# Patient Record
Sex: FEMALE | Race: WHITE | NOT HISPANIC OR LATINO | Employment: FULL TIME | URBAN - METROPOLITAN AREA
[De-identification: names, ages, dates, MRNs, and addresses within clinical notes are randomized per-mention and may not be internally consistent; named-entity substitution may affect disease eponyms.]

---

## 2017-01-07 ENCOUNTER — COMMUNICATION - HEALTHEAST (OUTPATIENT)
Dept: OBGYN | Facility: CLINIC | Age: 37
End: 2017-01-07

## 2017-01-12 ENCOUNTER — OFFICE VISIT - HEALTHEAST (OUTPATIENT)
Dept: OBGYN | Facility: CLINIC | Age: 37
End: 2017-01-12

## 2017-01-12 ENCOUNTER — TRANSFERRED RECORDS (OUTPATIENT)
Dept: HEALTH INFORMATION MANAGEMENT | Facility: CLINIC | Age: 37
End: 2017-01-12

## 2017-01-12 DIAGNOSIS — Z15.89 MTHFR MUTATION: ICD-10-CM

## 2017-01-12 DIAGNOSIS — N92.6 IRREGULAR MENSES: ICD-10-CM

## 2017-01-12 ASSESSMENT — MIFFLIN-ST. JEOR: SCORE: 1626.17

## 2017-01-16 ENCOUNTER — COMMUNICATION - HEALTHEAST (OUTPATIENT)
Dept: OBGYN | Facility: CLINIC | Age: 37
End: 2017-01-16

## 2017-01-17 ENCOUNTER — COMMUNICATION - HEALTHEAST (OUTPATIENT)
Dept: MIDWIFE SERVICES | Facility: CLINIC | Age: 37
End: 2017-01-17

## 2017-01-17 ENCOUNTER — AMBULATORY - HEALTHEAST (OUTPATIENT)
Dept: OBGYN | Facility: CLINIC | Age: 37
End: 2017-01-17

## 2017-01-17 DIAGNOSIS — N92.6 IRREGULAR MENSES: ICD-10-CM

## 2017-01-17 LAB
FACTOR V LEIDEN & PGM+MTHFR - HISTORICAL: ABNORMAL
SPECIMEN STATUS: ABNORMAL
THROMBOPHILIA RISK ASSESSMENT-L2M - HISTORICAL: ABNORMAL

## 2017-01-18 ENCOUNTER — AMBULATORY - HEALTHEAST (OUTPATIENT)
Dept: OBGYN | Facility: CLINIC | Age: 37
End: 2017-01-18

## 2017-01-18 DIAGNOSIS — Z15.89 MTHFR MUTATION: ICD-10-CM

## 2017-01-24 ENCOUNTER — AMBULATORY - HEALTHEAST (OUTPATIENT)
Dept: OBGYN | Facility: CLINIC | Age: 37
End: 2017-01-24

## 2017-01-24 ENCOUNTER — MEDICAL CORRESPONDENCE (OUTPATIENT)
Dept: HEALTH INFORMATION MANAGEMENT | Facility: CLINIC | Age: 37
End: 2017-01-24

## 2017-01-24 DIAGNOSIS — D68.52 PROTHROMBIN GENE MUTATION (H): ICD-10-CM

## 2017-01-24 DIAGNOSIS — Z15.89 MTHFR MUTATION: ICD-10-CM

## 2017-01-24 DIAGNOSIS — O26.90 PREGNANCY RELATED CONDITION, UNSPECIFIED TRIMESTER: Primary | ICD-10-CM

## 2017-02-01 ENCOUNTER — PRE VISIT (OUTPATIENT)
Dept: MATERNAL FETAL MEDICINE | Facility: CLINIC | Age: 37
End: 2017-02-01

## 2017-02-02 ENCOUNTER — OFFICE VISIT (OUTPATIENT)
Dept: MATERNAL FETAL MEDICINE | Facility: CLINIC | Age: 37
End: 2017-02-02
Attending: OBSTETRICS & GYNECOLOGY
Payer: COMMERCIAL

## 2017-02-02 DIAGNOSIS — Z31.69 ENCOUNTER FOR PRECONCEPTION CONSULTATION: ICD-10-CM

## 2017-02-02 RX ORDER — CLOMIPHENE CITRATE 50 MG/1
50 TABLET ORAL DAILY
COMMUNITY
End: 2019-02-09

## 2017-02-02 RX ORDER — LEVOTHYROXINE SODIUM 50 UG/1
50 TABLET ORAL
COMMUNITY
Start: 2015-01-22 | End: 2019-02-09

## 2017-02-02 RX ORDER — MAGNESIUM 200 MG
1000 TABLET ORAL
COMMUNITY
End: 2021-08-17

## 2017-02-02 RX ORDER — FERROUS SULFATE 325(65) MG
1 TABLET ORAL
COMMUNITY
End: 2019-02-09

## 2017-02-02 RX ORDER — FLUOXETINE 20 MG/1
1 TABLET, FILM COATED ORAL
COMMUNITY
Start: 2016-05-28 | End: 2021-08-17

## 2017-02-02 RX ORDER — SWAB
SWAB, NON-MEDICATED MISCELLANEOUS
COMMUNITY
End: 2021-08-17

## 2017-02-02 RX ORDER — CHOLECALCIFEROL (VITAMIN D3) 125 MCG
CAPSULE ORAL
COMMUNITY
End: 2021-08-17

## 2017-02-02 NOTE — NURSING NOTE
Leah presents to [facility] for preconceptual consult due to prothrombin mutation.  Leah also has a history of gastric bypass- lap manjeet-en-y 7/2015, PCOS, LEEP 2013.  Leah states she is feeling well, energy level is good.  Leah had 18 month follow up with bariatrics in December 2016. Medications reviewed.  Leah states she did a saliva test through psychiatry to determine what meds may or may not work and the gene mutation was discovered.  Leah has no personal or family history of bleeding or clotting disorders. Dr. Saxena and Dr Amaya met with Leah.    Isabella Reilly RN

## 2017-02-02 NOTE — PROGRESS NOTES
Dear Dr. Bridges,    Thank you for your request for consultation for your patient Ms. Leah Luciano for her history of bariatric surgery, prothrombin gene mutation, MTHFR mutation, and history of LEEP.    HPI: Ms. Luciano is a 36 year old G0 here for preconception counseling. She had a manjeet-en-Y gastric bypass in 7/2015. Her has lost 91 pounds since surgery. She last saw bariatric surgery in 12/2016 and saw nutrition at that time. Bariatric surgery recommended q 3 month nutrition follow up when pregnant, otherwise she sees nutrition and bariatric surgery department every six months with serial micronutrient labs.  Her most recent vitamin levels are noted below. She has been trying to conceive for 7 months. She was recently prescribed clomid by Dr. Bridges and plans to use it with her next cycle. She was diagnosed with MTHFR and prothrombin gene mutations due to a saliva test she took for a psychiatry clinic, which screens for pharmacology metabolism disorders in order to select drug choice. She has never had a DVT, PE or CVA and nobody in her family or extended family has a history of clots.    ObHx:    - Chemical pregnancy last year    GynHx:   - PCOS  - Irregular periods prior to menses  - H/o LEEP 2013, last pap NILM HPV negative  - Denies h/o STI    MedHx:  Past Medical History   Diagnosis Date     PCOS (polycystic ovarian syndrome)      SurgHx:  Past Surgical History   Procedure Laterality Date     Leep tx, cervical  2013     As gastric bypass,obese<100cm manjeet-en-y  7/2015     Laparoscopic bypass gastric       FamHx:   - Denies family history of VTE, CVA, DVT, anesthesia reactions, uterine, breast and ovarian cancer    SocHx:   - Lives with , denies illicit drug use, etoh and tobacco use    Meds:    Current outpatient prescriptions:      B-12, Methylcobalamin, 1000 MCG SUBL, , Disp: , Rfl:      levothyroxine (SYNTHROID/LEVOTHROID) 50 MCG tablet, Take 50 mcg by mouth, Disp: , Rfl:      cholecalciferol  (VITAMIN D3) 5000 UNITS CAPS capsule, Take 1 capsule by mouth, Disp: , Rfl:      co-enzyme Q-10 30 MG CAPS capsule, Take 30 mg by mouth, Disp: , Rfl:      cyanocobalamin 1000 MCG SUBL sublingual tablet, Place 1,000 mcg under the tongue, Disp: , Rfl:      ferrous sulfate (SM IRON) 325 (65 FE) MG tablet, Take 1 tablet by mouth, Disp: , Rfl:      FLUoxetine 20 MG tablet, Take 1 tablet by mouth, Disp: , Rfl:      Prenatal Vit-Fe Fumarate-FA (PRENATAL MULTIVITAMIN  WITH IRON) 28-0.8 MG TABS, , Disp: , Rfl:     Allergies:   Allergies   Allergen Reactions     Sulfa Drugs Unknown     Impression:    At today s visit we discussed with Ms. John the problems the management of pregnancy in the setting of bariatric surgery, obesity, MTHFR mutation and prothrombin gene mutation.  Regarding her history of bariatric surgery and obesity, we discussed that the most common problems are related to nutritional deficiencies, primarily iron, vitamin B12, folate and calcium.  We also discussed her risk of fetal IUGR, pre eclampsia, gestational diabetes,  delivery, and  delivery. We discussed the need to follow fetal growth with growth ultrasounds throughout the pregnancy. We recommend close follow up with bariatric nutritionist every trimester with serial micronutrient levels each trimesters and supplementation if indicated. We also recommend early glucose screening. We discussed that a traditional glucose challenge test can induce symptoms dumping syndrome, so monitoring QID fasting and postprandial blood glucoses for 1-2 weeks in early pregnancy is recommended as an alternative.     We discussed MTHFR mutations and prothrombin gene mutations. We discussed heterozygosity for MTHFR gene is clinically insignificant. Homozygosity is a common cause of hyperhomocysteinemia, which is a very weak risk factor for venous thromboembolism. However, MTHFR mutations by themselves do not appear to convey an increased risks for VTE in  either pregnant or nonpregnant women. Although in the past Lovenox was used in patient with various coagulopathies in an effort to decrease pregnancy loss, it has since been disproven to be of benefit. Regarding her prothrombin gene mutation heterozygote, this is considered a low risk thrombophilia. Prothrombin gene mutation is present in 2-5 percent of the general population and VTE risk per pregnancy in women with no history of VTE is <0.5%. The recommendation for lower-risk thrombophilias without a personal history of VTE is surveillance. Postpartum, anticoagulation may be warranted for individual patients with additional factors that place them at greater risk of thrombosis (prolonged immobility, first degree relative with unprovoked VTE,  delivery or obesity). She is at increased risk for postpartum VTE due to history of obesity. We would recommend surveillance for VTE without anticoagulation therapy during pregnancy, and initiation of prophylactic Lovenox postpartum for a total of six weeks. If exposed to bedrest or prolonged immobilization during pregnancy, initiation of prophylactic Lovenox should be considered. We discussed signs and symptoms of VTE and indications for seeking care.     I have made the following recommendations:  1. Level 2 ultrasound 18-20 weeks, repeat growth ultrasound at 28 and 34 weeks.  2. Follow up with bariatric nutritionist every trimester with serial micronutrient monitoring every trimester and initiation of supplementation if indicated. Obtain CBC, Ferritin, Iron, B12, Thiamine, Folate, Calcium and Vitamin D at first obstetric visit.  3. Early gestational diabetes screening in first trimester, utilize fasting and postprandial blood glucose monitoring  as an alternative to oral glucose challenge test.  4. Aspirin 81 mg daily starting in the first trimester to reduce the risk for preeclampsia/  5. Prophylactic dose LMWH postpartum for six weeks (Lovenox 40 mg sq daily),  earlier if exposed to prolonged immobilization.  6. Close surveillance for signs and symptoms of DVT in pregnancy  7. Genetic counseling in first trimester    A copy of this consultation will be sent to your office. Thank you for the opportunity to participate in the care of this patient.  If you have questions regarding today s evaluation or if we can be of further service, please contact the [facility].     The patient was seen for an outpatient consultation .  The majority of time (>50%) was spent on counseling and coordination of care of this patient and/or family members.  Approximate face-to-face time was 30 minutes.    I served as a scribe for Dr. Amaya for today's visit.    Eliana Saxena MD  OB GYN PGY-2  2/2/2017  5:25 PM    This note, as scribed, accurately reflects the examination, my impressions and plan as discussed with the patient.    Denise Amaya MD  Specialist in Maternal-Fetal Medicine

## 2017-03-27 ENCOUNTER — COMMUNICATION - HEALTHEAST (OUTPATIENT)
Dept: OBGYN | Facility: CLINIC | Age: 37
End: 2017-03-27

## 2017-10-09 ENCOUNTER — COMMUNICATION - HEALTHEAST (OUTPATIENT)
Dept: OBGYN | Facility: CLINIC | Age: 37
End: 2017-10-09

## 2017-11-02 ENCOUNTER — OFFICE VISIT - HEALTHEAST (OUTPATIENT)
Dept: SURGERY | Facility: CLINIC | Age: 37
End: 2017-11-02

## 2017-11-02 DIAGNOSIS — E66.01 OBESITY, MORBID, BMI 40.0-49.9 (H): ICD-10-CM

## 2017-11-02 DIAGNOSIS — Z98.84 BARIATRIC SURGERY STATUS: ICD-10-CM

## 2017-11-02 DIAGNOSIS — Z71.3 NUTRITIONAL COUNSELING: ICD-10-CM

## 2017-11-02 ASSESSMENT — MIFFLIN-ST. JEOR: SCORE: 1800.8

## 2017-11-21 ENCOUNTER — OFFICE VISIT - HEALTHEAST (OUTPATIENT)
Dept: FAMILY MEDICINE | Facility: CLINIC | Age: 37
End: 2017-11-21

## 2017-11-21 DIAGNOSIS — J02.9 SORE THROAT: ICD-10-CM

## 2017-11-21 DIAGNOSIS — J06.9 VIRAL URI: ICD-10-CM

## 2018-01-22 ENCOUNTER — INFUSION - HEALTHEAST (OUTPATIENT)
Dept: INFUSION THERAPY | Facility: CLINIC | Age: 38
End: 2018-01-22

## 2018-01-22 DIAGNOSIS — D50.9 IRON DEFICIENCY ANEMIA: ICD-10-CM

## 2018-01-22 DIAGNOSIS — O99.013 ANEMIA AFFECTING PREGNANCY IN THIRD TRIMESTER: ICD-10-CM

## 2018-01-24 ENCOUNTER — INFUSION - HEALTHEAST (OUTPATIENT)
Dept: INFUSION THERAPY | Facility: CLINIC | Age: 38
End: 2018-01-24

## 2018-01-24 DIAGNOSIS — O99.013 ANEMIA AFFECTING PREGNANCY IN THIRD TRIMESTER: ICD-10-CM

## 2018-01-24 DIAGNOSIS — D50.9 IRON DEFICIENCY ANEMIA: ICD-10-CM

## 2018-01-26 ENCOUNTER — INFUSION - HEALTHEAST (OUTPATIENT)
Dept: INFUSION THERAPY | Facility: CLINIC | Age: 38
End: 2018-01-26

## 2018-01-26 DIAGNOSIS — O99.013 ANEMIA AFFECTING PREGNANCY IN THIRD TRIMESTER: ICD-10-CM

## 2018-01-26 DIAGNOSIS — D50.9 IRON DEFICIENCY ANEMIA: ICD-10-CM

## 2018-03-19 ENCOUNTER — OFFICE VISIT - HEALTHEAST (OUTPATIENT)
Dept: FAMILY MEDICINE | Facility: CLINIC | Age: 38
End: 2018-03-19

## 2018-03-19 DIAGNOSIS — R10.11 RUQ PAIN: ICD-10-CM

## 2018-03-19 DIAGNOSIS — Z00.00 HEALTHCARE MAINTENANCE: ICD-10-CM

## 2018-03-19 ASSESSMENT — MIFFLIN-ST. JEOR: SCORE: 1788.89

## 2018-03-22 ENCOUNTER — HOSPITAL ENCOUNTER (OUTPATIENT)
Dept: ULTRASOUND IMAGING | Facility: CLINIC | Age: 38
Discharge: HOME | End: 2018-03-22
Attending: FAMILY MEDICINE

## 2018-03-22 ENCOUNTER — COMMUNICATION - HEALTHEAST (OUTPATIENT)
Dept: FAMILY MEDICINE | Facility: CLINIC | Age: 38
End: 2018-03-22

## 2018-03-22 DIAGNOSIS — R10.11 RUQ PAIN: ICD-10-CM

## 2018-03-27 ENCOUNTER — COMMUNICATION - HEALTHEAST (OUTPATIENT)
Dept: FAMILY MEDICINE | Facility: CLINIC | Age: 38
End: 2018-03-27

## 2018-03-27 DIAGNOSIS — R11.0 NAUSEA: ICD-10-CM

## 2018-03-27 DIAGNOSIS — R10.13 EPIGASTRIC PAIN: ICD-10-CM

## 2018-03-29 ENCOUNTER — AMBULATORY - HEALTHEAST (OUTPATIENT)
Dept: LAB | Facility: CLINIC | Age: 38
End: 2018-03-29

## 2018-03-29 DIAGNOSIS — R10.13 EPIGASTRIC PAIN: ICD-10-CM

## 2018-03-29 DIAGNOSIS — R11.0 NAUSEA: ICD-10-CM

## 2018-03-29 LAB
ALBUMIN SERPL-MCNC: 2.6 G/DL (ref 3.5–5)
ALP SERPL-CCNC: 125 U/L (ref 45–120)
ALT SERPL W P-5'-P-CCNC: 14 U/L (ref 0–45)
AMYLASE SERPL-CCNC: 38 U/L (ref 5–120)
ANION GAP SERPL CALCULATED.3IONS-SCNC: 9 MMOL/L (ref 5–18)
AST SERPL W P-5'-P-CCNC: 11 U/L (ref 0–40)
BILIRUB SERPL-MCNC: 0.2 MG/DL (ref 0–1)
BUN SERPL-MCNC: 9 MG/DL (ref 8–22)
CALCIUM SERPL-MCNC: 8.8 MG/DL (ref 8.5–10.5)
CHLORIDE BLD-SCNC: 105 MMOL/L (ref 98–107)
CO2 SERPL-SCNC: 23 MMOL/L (ref 22–31)
CREAT SERPL-MCNC: 0.68 MG/DL (ref 0.6–1.1)
GFR SERPL CREATININE-BSD FRML MDRD: >60 ML/MIN/1.73M2
GLUCOSE BLD-MCNC: 76 MG/DL (ref 70–125)
LIPASE SERPL-CCNC: 13 U/L (ref 0–52)
POTASSIUM BLD-SCNC: 4.4 MMOL/L (ref 3.5–5)
PROT SERPL-MCNC: 5.7 G/DL (ref 6–8)
SODIUM SERPL-SCNC: 137 MMOL/L (ref 136–145)

## 2018-05-11 ENCOUNTER — COMMUNICATION - HEALTHEAST (OUTPATIENT)
Dept: FAMILY MEDICINE | Facility: CLINIC | Age: 38
End: 2018-05-11

## 2018-05-30 ENCOUNTER — ANESTHESIA - HEALTHEAST (OUTPATIENT)
Dept: OBGYN | Facility: CLINIC | Age: 38
End: 2018-05-30

## 2018-05-30 ENCOUNTER — SURGERY - HEALTHEAST (OUTPATIENT)
Dept: OBGYN | Facility: CLINIC | Age: 38
End: 2018-05-30

## 2018-05-30 ASSESSMENT — MIFFLIN-ST. JEOR: SCORE: 1828.02

## 2018-06-04 ENCOUNTER — HOME CARE/HOSPICE - HEALTHEAST (OUTPATIENT)
Dept: HOME HEALTH SERVICES | Facility: HOME HEALTH | Age: 38
End: 2018-06-04

## 2018-08-06 ENCOUNTER — COMMUNICATION - HEALTHEAST (OUTPATIENT)
Dept: TELEHEALTH | Facility: CLINIC | Age: 38
End: 2018-08-06

## 2018-08-06 ENCOUNTER — COMMUNICATION - HEALTHEAST (OUTPATIENT)
Dept: HEALTH INFORMATION MANAGEMENT | Facility: CLINIC | Age: 38
End: 2018-08-06

## 2018-10-04 ENCOUNTER — AMBULATORY - HEALTHEAST (OUTPATIENT)
Dept: NURSING | Facility: CLINIC | Age: 38
End: 2018-10-04

## 2018-10-04 DIAGNOSIS — Z00.00 HEALTHCARE MAINTENANCE: ICD-10-CM

## 2018-10-28 ENCOUNTER — OFFICE VISIT - HEALTHEAST (OUTPATIENT)
Dept: FAMILY MEDICINE | Facility: CLINIC | Age: 38
End: 2018-10-28

## 2018-10-28 DIAGNOSIS — M54.2 NECK PAIN ON RIGHT SIDE: ICD-10-CM

## 2018-10-28 LAB
BASOPHILS # BLD AUTO: 0.1 THOU/UL (ref 0–0.2)
BASOPHILS NFR BLD AUTO: 1 % (ref 0–2)
EOSINOPHIL # BLD AUTO: 0.3 THOU/UL (ref 0–0.4)
EOSINOPHIL NFR BLD AUTO: 4 % (ref 0–6)
ERYTHROCYTE [DISTWIDTH] IN BLOOD BY AUTOMATED COUNT: 12.6 % (ref 11–14.5)
HCT VFR BLD AUTO: 42.3 % (ref 35–47)
HGB BLD-MCNC: 13.4 G/DL (ref 12–16)
LYMPHOCYTES # BLD AUTO: 2.8 THOU/UL (ref 0.8–4.4)
LYMPHOCYTES NFR BLD AUTO: 38 % (ref 20–40)
MCH RBC QN AUTO: 28.7 PG (ref 27–34)
MCHC RBC AUTO-ENTMCNC: 31.7 G/DL (ref 32–36)
MCV RBC AUTO: 91 FL (ref 80–100)
MONOCYTES # BLD AUTO: 0.4 THOU/UL (ref 0–0.9)
MONOCYTES NFR BLD AUTO: 6 % (ref 2–10)
NEUTROPHILS # BLD AUTO: 3.7 THOU/UL (ref 2–7.7)
NEUTROPHILS NFR BLD AUTO: 51 % (ref 50–70)
PLATELET # BLD AUTO: 307 THOU/UL (ref 140–440)
PMV BLD AUTO: 10.6 FL (ref 8.5–12.5)
RBC # BLD AUTO: 4.67 MILL/UL (ref 3.8–5.4)
WBC: 7.3 THOU/UL (ref 4–11)

## 2018-12-13 ENCOUNTER — AMBULATORY - HEALTHEAST (OUTPATIENT)
Dept: LAB | Facility: CLINIC | Age: 38
End: 2018-12-13

## 2018-12-13 ENCOUNTER — OFFICE VISIT - HEALTHEAST (OUTPATIENT)
Dept: SURGERY | Facility: CLINIC | Age: 38
End: 2018-12-13

## 2018-12-13 ENCOUNTER — COMMUNICATION - HEALTHEAST (OUTPATIENT)
Dept: SCHEDULING | Facility: CLINIC | Age: 38
End: 2018-12-13

## 2018-12-13 DIAGNOSIS — K91.2 POSTOPERATIVE MALABSORPTION: ICD-10-CM

## 2018-12-13 LAB
ALBUMIN SERPL-MCNC: 3.8 G/DL (ref 3.5–5)
ALP SERPL-CCNC: 135 U/L (ref 45–120)
ALT SERPL W P-5'-P-CCNC: 33 U/L (ref 0–45)
ANION GAP SERPL CALCULATED.3IONS-SCNC: 13 MMOL/L (ref 5–18)
AST SERPL W P-5'-P-CCNC: 29 U/L (ref 0–40)
BILIRUB SERPL-MCNC: 0.3 MG/DL (ref 0–1)
BUN SERPL-MCNC: 15 MG/DL (ref 8–22)
CALCIUM SERPL-MCNC: 9.5 MG/DL (ref 8.5–10.5)
CHLORIDE BLD-SCNC: 106 MMOL/L (ref 98–107)
CO2 SERPL-SCNC: 23 MMOL/L (ref 22–31)
CREAT SERPL-MCNC: 0.78 MG/DL (ref 0.6–1.1)
ERYTHROCYTE [DISTWIDTH] IN BLOOD BY AUTOMATED COUNT: 12.3 % (ref 11–14.5)
FASTING STATUS PATIENT QL REPORTED: NO
FERRITIN SERPL-MCNC: 16 NG/ML (ref 10–130)
FOLATE SERPL-MCNC: 7.6 NG/ML
GFR SERPL CREATININE-BSD FRML MDRD: >60 ML/MIN/1.73M2
GLUCOSE BLD-MCNC: 56 MG/DL (ref 70–125)
HBA1C MFR BLD: 5.8 % (ref 3.5–6)
HCT VFR BLD AUTO: 41.2 % (ref 35–47)
HDLC SERPL-MCNC: 69 MG/DL
HGB BLD-MCNC: 14.1 G/DL (ref 12–16)
LDLC SERPL CALC-MCNC: 139 MG/DL
MCH RBC QN AUTO: 29.3 PG (ref 27–34)
MCHC RBC AUTO-ENTMCNC: 34.2 G/DL (ref 32–36)
MCV RBC AUTO: 86 FL (ref 80–100)
PLATELET # BLD AUTO: 333 THOU/UL (ref 140–440)
PMV BLD AUTO: 8.2 FL (ref 7–10)
POTASSIUM BLD-SCNC: 4.4 MMOL/L (ref 3.5–5)
PROT SERPL-MCNC: 6.9 G/DL (ref 6–8)
PTH-INTACT SERPL-MCNC: 122 PG/ML (ref 10–86)
RBC # BLD AUTO: 4.8 MILL/UL (ref 3.8–5.4)
SODIUM SERPL-SCNC: 142 MMOL/L (ref 136–145)
TSH SERPL DL<=0.005 MIU/L-ACNC: 2.59 UIU/ML (ref 0.3–5)
VIT B12 SERPL-MCNC: 368 PG/ML (ref 213–816)
WBC: 8 THOU/UL (ref 4–11)

## 2018-12-13 ASSESSMENT — MIFFLIN-ST. JEOR: SCORE: 1746.37

## 2018-12-14 LAB — 25(OH)D3 SERPL-MCNC: 18.8 NG/ML (ref 30–80)

## 2018-12-17 LAB
ANNOTATION COMMENT IMP: NORMAL
VIT A SERPL-MCNC: 0.58 MG/L (ref 0.3–1.2)
VITAMIN A (RETINYL PALMITATE): 0.05 MG/L (ref 0–0.1)
ZINC SERPL-MCNC: 54 UG/DL (ref 60–120)

## 2018-12-19 LAB — VIT B1 PYROPHOSHATE BLD-SCNC: 113 NMOL/L (ref 70–180)

## 2019-01-24 ENCOUNTER — AMBULATORY - HEALTHEAST (OUTPATIENT)
Dept: SURGERY | Facility: CLINIC | Age: 39
End: 2019-01-24

## 2019-01-24 ENCOUNTER — COMMUNICATION - HEALTHEAST (OUTPATIENT)
Dept: SURGERY | Facility: CLINIC | Age: 39
End: 2019-01-24

## 2019-01-24 DIAGNOSIS — E21.3 HYPERPARATHYROIDISM (H): ICD-10-CM

## 2019-01-24 DIAGNOSIS — K91.2 POSTOPERATIVE MALABSORPTION: ICD-10-CM

## 2019-01-24 DIAGNOSIS — E55.9 VITAMIN D DEFICIENCY: ICD-10-CM

## 2019-02-09 ENCOUNTER — OFFICE VISIT (OUTPATIENT)
Dept: URGENT CARE | Facility: URGENT CARE | Age: 39
End: 2019-02-09
Payer: COMMERCIAL

## 2019-02-09 ENCOUNTER — ANCILLARY PROCEDURE (OUTPATIENT)
Dept: GENERAL RADIOLOGY | Facility: CLINIC | Age: 39
End: 2019-02-09
Attending: FAMILY MEDICINE
Payer: COMMERCIAL

## 2019-02-09 VITALS
BODY MASS INDEX: 48.52 KG/M2 | HEIGHT: 61 IN | WEIGHT: 257 LBS | SYSTOLIC BLOOD PRESSURE: 118 MMHG | TEMPERATURE: 98.7 F | HEART RATE: 67 BPM | DIASTOLIC BLOOD PRESSURE: 74 MMHG | OXYGEN SATURATION: 97 %

## 2019-02-09 DIAGNOSIS — M79.645 PAIN OF FINGER OF LEFT HAND: Primary | ICD-10-CM

## 2019-02-09 DIAGNOSIS — M79.645 PAIN OF FINGER OF LEFT HAND: ICD-10-CM

## 2019-02-09 PROBLEM — E66.01 MORBID OBESITY (H): Status: ACTIVE | Noted: 2019-02-09

## 2019-02-09 PROCEDURE — 73130 X-RAY EXAM OF HAND: CPT | Mod: RT

## 2019-02-09 PROCEDURE — 99203 OFFICE O/P NEW LOW 30 MIN: CPT | Performed by: FAMILY MEDICINE

## 2019-02-09 ASSESSMENT — MIFFLIN-ST. JEOR: SCORE: 1788.12

## 2019-02-09 NOTE — PATIENT INSTRUCTIONS
1. Ice area twice per day  2. Ibuprofen 400 mg 3 times per day  3. Follow in 1 week if still with pain.

## 2019-02-09 NOTE — PROGRESS NOTES
SUBJECTIVE:  Leah John is a 38 year old female who sustained a right hand injury several days ago. Mechanism of injury: Crush type of injury involving the middle and fourth fingers. Immediate symptoms: immediate swelling. Symptoms have been sudden since that time. Prior history of related problems: no prior problems with this area in the past.  Seems to be getting worse over the last several days hard to bend      Has not used any medication is not used in the immobilization    OBJECTIVE:  Vital signs as noted above.  Appearance: in no apparent distress.  Hand exam: soft tissue tenderness and swelling at the PIP joint of the middle finger extends all the way down to the base of the finger or the MCP.  In addition she has some decreased range of motion  X-ray: no fracture or dislocation noted.    Family history is positive for depression, as well as diabetes    ASSESSMENT:  hand contusion    PLAN:  NSAID, ice suggested  See orders in [facility].  We also placed her in a    Splint.    Review this in 1 week with her primary care if not decrease in pain

## 2020-01-29 ENCOUNTER — RECORDS - HEALTHEAST (OUTPATIENT)
Dept: ADMINISTRATIVE | Facility: OTHER | Age: 40
End: 2020-01-29

## 2020-09-14 ENCOUNTER — COMMUNICATION - HEALTHEAST (OUTPATIENT)
Dept: FAMILY MEDICINE | Facility: CLINIC | Age: 40
End: 2020-09-14

## 2020-09-21 ENCOUNTER — AMBULATORY - HEALTHEAST (OUTPATIENT)
Dept: NURSING | Facility: CLINIC | Age: 40
End: 2020-09-21

## 2020-09-21 DIAGNOSIS — Z23 NEED FOR INFLUENZA VACCINATION: ICD-10-CM

## 2020-09-28 ENCOUNTER — COMMUNICATION - HEALTHEAST (OUTPATIENT)
Dept: FAMILY MEDICINE | Facility: CLINIC | Age: 40
End: 2020-09-28

## 2020-12-13 ENCOUNTER — HEALTH MAINTENANCE LETTER (OUTPATIENT)
Age: 40
End: 2020-12-13

## 2020-12-16 ENCOUNTER — COMMUNICATION - HEALTHEAST (OUTPATIENT)
Dept: FAMILY MEDICINE | Facility: CLINIC | Age: 40
End: 2020-12-16

## 2020-12-17 ENCOUNTER — AMBULATORY - HEALTHEAST (OUTPATIENT)
Dept: FAMILY MEDICINE | Facility: CLINIC | Age: 40
End: 2020-12-17

## 2020-12-17 DIAGNOSIS — M79.673 FOOT ARCH PAIN, UNSPECIFIED LATERALITY: ICD-10-CM

## 2020-12-21 ENCOUNTER — OFFICE VISIT - HEALTHEAST (OUTPATIENT)
Dept: PODIATRY | Facility: CLINIC | Age: 40
End: 2020-12-21

## 2020-12-21 DIAGNOSIS — M21.621 TAILOR'S BUNION OF RIGHT FOOT: ICD-10-CM

## 2020-12-21 DIAGNOSIS — M72.2 PLANTAR FASCIITIS: ICD-10-CM

## 2020-12-22 ENCOUNTER — COMMUNICATION - HEALTHEAST (OUTPATIENT)
Dept: OTHER | Facility: CLINIC | Age: 40
End: 2020-12-22

## 2021-01-15 ENCOUNTER — RECORDS - HEALTHEAST (OUTPATIENT)
Dept: ADMINISTRATIVE | Facility: OTHER | Age: 41
End: 2021-01-15

## 2021-01-22 ENCOUNTER — AMBULATORY - HEALTHEAST (OUTPATIENT)
Dept: OTHER | Facility: CLINIC | Age: 41
End: 2021-01-22

## 2021-01-22 DIAGNOSIS — M72.2 PLANTAR FASCIAL FIBROMATOSIS: ICD-10-CM

## 2021-01-22 DIAGNOSIS — M21.621 TAILOR'S BUNION OF RIGHT FOOT: ICD-10-CM

## 2021-03-31 ENCOUNTER — OFFICE VISIT - HEALTHEAST (OUTPATIENT)
Dept: FAMILY MEDICINE | Facility: CLINIC | Age: 41
End: 2021-03-31

## 2021-03-31 DIAGNOSIS — N64.4 BREAST PAIN: ICD-10-CM

## 2021-03-31 ASSESSMENT — MIFFLIN-ST. JEOR: SCORE: 1866.12

## 2021-05-26 VITALS
SYSTOLIC BLOOD PRESSURE: 116 MMHG | RESPIRATION RATE: 16 BRPM | HEART RATE: 80 BPM | DIASTOLIC BLOOD PRESSURE: 74 MMHG | TEMPERATURE: 98.2 F

## 2021-05-29 ENCOUNTER — RECORDS - HEALTHEAST (OUTPATIENT)
Dept: ADMINISTRATIVE | Facility: CLINIC | Age: 41
End: 2021-05-29

## 2021-05-30 VITALS — WEIGHT: 227 LBS | BODY MASS INDEX: 44.57 KG/M2 | HEIGHT: 60 IN

## 2021-05-31 VITALS — BODY MASS INDEX: 49.77 KG/M2 | WEIGHT: 263.4 LBS

## 2021-05-31 VITALS — BODY MASS INDEX: 49.47 KG/M2 | WEIGHT: 262 LBS | HEIGHT: 61 IN

## 2021-06-01 VITALS — BODY MASS INDEX: 50.6 KG/M2 | WEIGHT: 268 LBS | HEIGHT: 61 IN

## 2021-06-01 VITALS — HEIGHT: 60 IN | WEIGHT: 262 LBS | BODY MASS INDEX: 51.44 KG/M2

## 2021-06-02 VITALS — WEIGHT: 250 LBS | BODY MASS INDEX: 47.24 KG/M2

## 2021-06-02 VITALS — BODY MASS INDEX: 47.2 KG/M2 | HEIGHT: 61 IN | WEIGHT: 250 LBS

## 2021-06-05 VITALS
HEART RATE: 66 BPM | BODY MASS INDEX: 54.95 KG/M2 | DIASTOLIC BLOOD PRESSURE: 68 MMHG | SYSTOLIC BLOOD PRESSURE: 102 MMHG | WEIGHT: 279.9 LBS | OXYGEN SATURATION: 95 % | HEIGHT: 60 IN

## 2021-06-08 NOTE — PROGRESS NOTES
CC: The patient is being seen as a consult from Mary Gr CNM, secondary to a desire for pregnancy.    HPI: The pt is a 36 y.o. MWF P0 who presents with a desire to get pregnant and irregular cycles.  Over the last year her cycles have ranged from 28 to 58 days, with the interval lengthening as the year has progressed.  She only bleeds for 2-3 days, and it is light.  Her periods have been irregular since she was a teenager.  She has been on OCPs/NuvaRing in the past.  She had a gastric bypass done in July of 2015 and has lost 95 pounds.  Her weight has been stable for the last 6 months or so.  She and her  have been attempting pregnancy since June.  She has done ovulation testing with both BBTs and urine predictor kits.  Her ovulation happens about 15 days before her menses starts.  She was diagnosed with PCOS in the past because of her clinical picture, but she's not had labs drawn or ultrasounds done.  She has been on metformin in the past that was no help as far as weight loss was concerned.  She is on phentermine now.  She recently started trazodone to help with sleep.  She is taking a prenatal vitamin along with her other supplements.  She did have a saliva test that was positive for a MTHFR mutation but it doesn't say if it is hetero- or homozygous.    Past Medical History   Diagnosis Date     Anisometropia      Anxiety      Cervical intraepithelial neoplasia III 2013     Depression      20s; on medications zoloft; prozac x 1 year     Dyslipidemia 2014     Hypothyroidism      Metabolic syndrome      Morbid obesity      MTHFR mutation      Polycystic ovary syndrome      Pre-diabetes      Strabismus        Past Surgical History   Procedure Laterality Date     Ganglion cyst excision Right      Eye surgery       x3; as a child     Pr lap gastric bypass/manjeet-en-y N/A 7/15/2015     Mark Olivier MD; [facility]     Cervical biopsy  w/ loop electrode excision  2013     ALFRED III       Patient's   Family  History   Problem Relation Age of Onset     Obesity Mother      Depression Father      Depression Sister      Depression Brother      Diabetes Brother      insulin     Depression Maternal Grandmother      Depression Maternal Grandfather      Depression Paternal Grandmother      Depression Paternal Grandfather      Cancer Paternal Grandfather      found late, mets, possible lung     Anesthesia problems Neg Hx        Patient   Social History     Social History     Marital status:      Spouse name: N/A     Number of children: N/A     Years of education: N/A     Social History Main Topics     Smoking status: Never Smoker     Smokeless tobacco: Never Used     Alcohol use 1.5 oz/week     3 Standard drinks or equivalent per week     Drug use: No     Sexual activity: Yes     Partners: Male     Birth control/ protection: None     Other Topics Concern     None     Social History Narrative       Current Outpatient Prescriptions   Medication Sig Dispense Refill     PNV95/FERROUS FUMARATE/FA (PRENATAL ORAL) Take by mouth.       ALPRAZolam (XANAX) 0.25 MG tablet Take 1 tablet by mouth as needed.  2     cholecalciferol, vitamin D3, 5,000 unit capsule Take 1 capsule by mouth daily.       CHOLINE BITARTRATE ORAL Take 1 tablet by mouth daily.       clomiPHENE (CLOMID) 50 mg tablet Take 1 tablet (50 mg total) by mouth daily for 5 days. Take days 3-7 of cycle 5 tablet 3     co-enzyme Q-10 30 mg capsule Take 30 mg by mouth daily.       cyanocobalamin, vitamin B-12, 1,000 mcg Subl Place 1,000 mcg under the tongue daily.       ferrous sulfate (IRON) 325 (65 FE) MG tablet Take 1 tablet by mouth daily with breakfast.       FLUoxetine (PROZAC) 20 MG tablet Take 1 tablet by mouth daily.  11     INOSITOL ORAL Take 4,000 mg by mouth daily.       MULTIVIT WITH IRON,HEMATINIC (SUPER B-COMPLEX ORAL) Take 1 capsule by mouth daily.       phentermine (ADIPEX-P) 37.5 mg tablet Take 1/2 to 1 tablet in the morning. 90 tablet 1     phentermine  (ADIPEX-P) 37.5 mg tablet Take 1/2 to 1 tablet in the morning. 90 tablet 1     traZODone (DESYREL) 50 MG tablet Take 1 tablet by mouth bedtime as needed.  11     vitamin E 400 unit capsule Take 1 capsule by mouth daily.       No current facility-administered medications for this visit.        Patient is allergic to sulfa (sulfonamide antibiotics).    ROS:  12 part ROS is negative aside from those symptoms in the HPI    PE:    Visit Vitals     /62 (Patient Site: Left Arm, Patient Position: Sitting, Cuff Size: Infant)     Pulse 78     Resp 20     Ht 5' (1.524 m)     Wt (!) 227 lb (103 kg)     LMP 01/05/2017             Body mass index is 44.33 kg/(m^2).    General: obese WF, NAD  Psych: normal mood  Neuro: CN I-XII grossly intact  MS: normal gait    Assessment: 36 y.o. MWF P0 with likely polycystic ovarian syndrome causing irregular menses.    Plan: Natural history of PCOS and fertility discussed with the patient.  We also discussed PCOS in general with it's increased risks for endometrial cancer and diabetes over time.  We discussed her medications and that she should discontinue the phentermine (category X) now and the trazodone (category C) with conception.  We discussed Clomid, and she would like to go ahead and try it.  We discussed the increased risk of multiple gestation of 10-12% with Clomid use.  We discussed timing and dosing.  Prescription was sent in for her.  We discussed that 85% of women who will get pregnant from Clomid will do so in the first 3-4 months of use at the correct dose.  If she doesn't get a period by day 35 after using it and she isn't pregnant, she will let me know so we can increase the dose.  MTHFR testing was ordered today to determine what her actual status is so if she needs further intervention with pregnancy, that can occur.  We discussed PNV, vitamin D and fish oil specific to pregnancy.  We also discussed exercise and pregnancy.    Approximately 40 minutes were spent with  the patient with the majority in counseling.

## 2021-06-11 NOTE — TELEPHONE ENCOUNTER
Upcoming Appointment Question  When is the appointment: 09/17/20  What is your appointment for?: Flu shot - family of 2   Who is your appointment scheduled with?: [facility] Nurse/clinical schedule  What is your question/concern?: patient was calling to reschedule her and her daughter's flu shot appointments.    Patient states she is looking for late in the date as possible on a Monday, Wednesday, or a Friday.   CS is currently only scheduling flu shots for cgr/rs/stw; patient states these location would not work and was hoping clinic would be able to reschedule them at [facility]  Okay to leave a detailed message?: Yes

## 2021-06-13 NOTE — PATIENT INSTRUCTIONS - HE
What are Prescription Custom Orthotics?  Custom orthotics are specially-made devices designed to support and comfort your feet. Prescription orthotics are crafted for you and no one else. They match the contours of your feet precisely and are designed for the way you move. Orthotics are only manufactured after a podiatrist has conducted a complete evaluation of your feet, ankles, and legs, so the orthotic can accommodate your unique foot structure and pathology.  Prescription orthotics are divided into two categories:    Functional orthotics are designed to control abnormal motion. They may be used to treat foot pain caused by abnormal motion; they can also be used to treat injuries such as shin splints or tendinitis. Functional orthotics are usually crafted of a semi-rigid material such as plastic or graphite.    Accommodative orthotics are softer and meant to provide additional cushioning and support. They can be used to treat diabetic foot ulcers, painful calluses on the bottom of the foot, and other uncomfortable conditions.  Podiatrists use orthotics to treat foot problems such as plantar fasciitis, bursitis, tendinitis, diabetic foot ulcers, and foot, ankle, and heel pain. Clinical research studies have shown that podiatrist-prescribed foot orthotics decrease foot pain and improve function.  Orthotics typically cost more than shoe inserts purchased in a retail store, but the additional cost is usually well worth it. Unlike shoe inserts, orthotics are molded to fit each individual foot, so you can be sure that your orthotics fit and do what they're supposed to do. Prescription orthotics are also made of top-notch materials and last many years when cared for properly. Insurance often helps pay for prescription orthotics.  What are Shoe Inserts?   You've seen them at the grocery store and at the mall. You've probably even seen them on TV and online. Shoe inserts are any kind of non-prescription foot support  designed to be worn inside a shoe. Pre-packaged, mass produced, arch supports are shoe inserts. So are the  custom-made  insoles and foot supports that you can order online or at retail stores. Unless the device has been prescribed by a doctor and crafted for your specific foot, it's a shoe insert, not a custom orthotic device--despite what the ads might say.  Shoe inserts can be very helpful for a variety of foot ailments, including flat arches and foot and leg pain. They can cushion your feet, provide comfort, and support your arches, but they can't correct biomechanical foot problems or cure long-standing foot issues.  The most common types of shoe inserts are:    Arch supports: Some people have high arches. Others have low arches or flat feet. Arch supports generally have a  bumped-up  appearance and are designed to support the foot's natural arch.     Insoles: Insoles slip into your shoe to provide extra cushioning and support. Insoles are often made of gel, foam, or plastic.     Heel liners: Heel liners, sometimes called heel pads or heel cups, provide extra cushioning in the heel region. They may be especially useful for patients who have foot pain caused by age-related thinning of the heels' natural fat pads.     Foot cushions: Do your shoes rub against your heel or your toes? Foot cushions come in many different shapes and sizes and can be used as a barrier between you and your shoe.  Choosing an Over-the-Counter Shoe Insert  Selecting a shoe insert from the wide variety of devices on the market can be overwhelming. Here are some podiatrist-tested tips to help you find the insert that best meets your needs:    Consider your health. Do you have diabetes? Problems with circulation? An over-the-counter insert may not be your best bet. Diabetes and poor circulation increase your risk of foot ulcers and infections, so schedule an appointment with a podiatrist. He or she can help you select a solution that won't  cause additional health problems.     Think about the purpose. Are you planning to run a marathon, or do you just need a little arch support in your work shoes? Look for a product that fits your planned level of activity.     Bring your shoes. For the insert to be effective, it has to fit into your shoes. So bring your sneakers, dress shoes, or work boots--whatever you plan to wear with your insert. Look for an insert that will fit the contours of your shoe.     Try them on. If all possible, slip the insert into your shoe and try it out. Walk around a little. How does it feel? Don't assume that feelings of pressure will go away with continued wear. (If you can't try the inserts at the store, ask about the store's return policy and hold on to your receipt.)    Please call one of the [place] locations below to schedule an appointment. If you received a prescription please bring it with you to your appointment. Some locations are limited to what they carry.    Office Locations    [facility]  [address] [place] [address]  Home Medical Equipment, [address]   Phone: [phone]   Orthotics and Prosthetics, [address]   Phone: [phone]    [facility]  Home Medical Equipment  [address], [address], [place] [address]   Phone: [phone]    Orthotics and Prosthetics ([facility])    [address], [address], [place] [address]  Phone: [phone]    [facility]  [address]   [place] [address]   Phone: [phone]    [facility]  [address]  [place] [address]  Phone: [phone]    [facility] Bld[facility]   [address]  [place] [address]  Phone: [phone]    [facility]  [address]  [place] [address]  Phone: [phone]    [facility]  [address]  [place] [address]  Phone: [phone]    [address] [place] [address]   Phone: [phone]

## 2021-06-13 NOTE — PROGRESS NOTES
Non-surgical Weight Loss Follow Up Diet Evaluation    Assessment:  This patient is a 37 y.o. female is being seen today for follow-up non-surgical nutritional evaluation. Today we reviewed the patients current eating habits and level of physical activity, and instructed on the changes that are required for successful weight loss outcomes.    Pt is 9 weeks pregnant and is 2 years s/p RNY.     Phentermine: none     Pt's Initial Weight: 311 lbs  Weight: 262 lb (118.8 kg)  Weight loss from initial: 49  % Weight loss: 15.76 %  BMI: Body mass index is 49.5 kg/(m^2).  IBW: 105 lbs    Personal goal weight: none at this time     Estimated RMR ([place]-St Jeor equation):  1818 calories  Protein requirements (.5grams to .9grams per pound IBW, 20-30% of calories, minimum of 60-80gm per day):  55-95 grams    Progress made since last visit: Pt is 9 weeks pregnant and experiencing nausea, thus little food intake has been consumed. Pt reports trying to drink more water and watch portion sizes.   Concerns: low protein intake.     Diet Recall/Time:   Breakfast: english muffin w/ butter and string cheese and milk (15g)  Am Snack: none or handful of cheerios  Lunch: chicken wraps or shamir noodles (0-15g)   Pm snack: cheese stick (8g)  Dinner: chicken wrap with lettuce, cheese, ranch (15g)   HS Snack: none or no-sugar fruit popsicle     Per Diet recall estimated protein: 30-50 grams    Meals per week away from home: 1x/week     Recommended limiting eating out to no more than 2x/week.  Patient and I reviewed the importance of eating three consistent meals per day; as well as meal timing to be spaced 4-5 hours apart.  Snack choices: 100-150 calories (1-2x/day if physically hungry), incorporating a fruit/vegetable w/ protein source.    Meal Duration: 15 minutes    Portion Sizes problematic? YES per patient/diet recall  Encouraged slowing meal times down, 20-30 minutes, chewing to applesauce consistency.   To aid in proper portion control  and slow meal time down discussed consuming meals off smaller plates, use toddler/children utensils and set utensils down after each bite.    Protein, vegetables/fruits, carbohydrates:   The patient and I discussed the importance of including lean/low fat protein at each meal and limiting carbohydrate intake to less than 25% of plate volume.       Vitamins/Mineral Supplementation: prenatal, vitamin C, all bariatric labs WNL     Beverages (Type/Oz. per day)  Water: 64 oz or Gatorade light   Coffee: none  Tea: pritesh tea in the morning   Milk: 16 oz 1%   Regular soda: none   Diet soda: none   Juice: none   Rinku-Aid/lemonade/etc: none   Alcohol: none     Discussed the importance of adequate hydration and the goal of 64+ oz of fluid daily.   The patient understands the importance of  avoiding all sweetened and alcoholic drinks, and instead choosing 64 oz plain water.    Exercise  Nothing routine established.     Pt's understands that 45-60 minutes of daily activity is an important part of weight loss success.   Encouraged pt to incorporate  strength training exercise in addition to cardiovascular exercise most days of the week.    PES statement:     1. (NC-3.3.5) Obese, class III, BMI ?40 related to physical inactivity as evidenced by Infrequent, low-duration and or low intensity physical activity; and Large amounts of sedentary activities; no structured physical activity regimen.    Intervention:  Discussion:  1. Educated pt on Eat Better, Move More, Live Well: Non-surgical Weight Loss Handout  2. Reviewed lean protein sources.  20-30 gm protein at 3 meals daily. 60-80 grams daily total.  3. Educated pt on pregnancy nutrition therapy after bariatric surgery   4. Discussed the benefit of staying hydrated and increasing protein  5. Discussed weight gain expected during pregnancy   6. Plate Method: The patient and I discussed the importance of including lean/low  fat protein at each meal and limiting carbohydrate intake to  less  than 25% of plate volume.    Instructions/Goals:   1. Include 20-30gm protein at each meal.  2. Increase vegetable/fruit intake, by having a vegetable or fruit with each meal daily. Recommended pt to increase vegetable/fruit intake to 4-5 servings daily.  3. Increase fluid intake to 64oz daily: choose plain or calorie/alcohol-free beverages.  4. Incorporate daily structured activity, 45-60 minutes most days of the week         Practice plate method: 1/2 plate lean/low fat protein source, vegetable/fruit, <25% of plate complex carbohydrates.  5. Practice eating off of smaller plates/bowls, chewing to applesauce consistency, taking 20-30 minutes to eat in a calm/relaxed environment without distractions of tv/email/cell phone.    Handouts Provided:  Pregnancy Nutrition after Bariatric Surgery  List of protein sources    Monitor/Evaluation:    Pt will f/u in one month with bariatrician and RD.     Plan for next visit with RD:    Review protein intake   Educated pt on food labels  Review carbohydrates/fiber  Exercise      Time In: 11:00am  Time Out: 11:30am      ABN signed: Yes

## 2021-06-13 NOTE — PROGRESS NOTES
FOOT AND ANKLE SURGERY/PODIATRY Progress Note        ASSESSMENT:   Plantar Fasciitis right   Gastrosoleus Equinus   Tailor's bunion right       TREATMENT:  -Patient has pain along the plantar heel at insertion of plantar fascia consistent with plantar fasciitis. We discussed treatment options to include stretching exercises, anti-inflammatory medication, orthotics, steroid injections, physical therapy and a night splint.     -All questions invited and answered. I will start her on Naproxen and have referred her to [facility] for custom orthotics.     -I have referred her for right foot x-rays to evaluate for bony pathology. I will contact her with any abnormal results.     -I have asked her to follow-up after using the orthotics x3-4 weeks if symptoms continue.     Theo Raines DPM  [facility] Podiatry/Foot & Ankle Surgery      HPI: I was asked to see Leah John today for right heel pain and lateral foot pain. The patient denies trauma and states she has had pain for several months. She describes plantar right arch pain which she has used accommodative inserts with some relief, obtained in . She also reports having pain along the lateral right foot along the 5th metatarsal. Works on her feet.     Past Medical History:   Diagnosis Date     Anisometropia      Anxiety      Cervical intraepithelial neoplasia III      Depression     20s; on medications zoloft; prozac x 1 year     Dyslipidemia      Hypothyroidism      Metabolic syndrome      Morbid obesity (H)      MTHFR mutation (H)     heterozygous     Polycystic ovary syndrome      Pre-diabetes      Prothrombin gene mutation (H) 2017    heterozygous     Strabismus        Past Surgical History:   Procedure Laterality Date     CERVICAL BIOPSY  W/ LOOP ELECTRODE EXCISION      ALFRED III      SECTION N/A 2018    Procedure: PRIMARY  SECTION;  Surgeon: Becky Rg MD;  Location: [facility] OR;  Service:       EYE SURGERY      x3; as a child     GANGLION CYST EXCISION Right      GASTRIC BYPASS       MD LAP GASTRIC BYPASS/CRISTIANE-EN-Y N/A 7/15/2015    Mrak Olivier MD; [facility]       Allergies   Allergen Reactions     Sulfa (Sulfonamide Antibiotics) Other (See Comments)     Pain         Current Outpatient Medications:      FLUoxetine (PROZAC) 20 MG tablet, Take 1 tablet by mouth daily., Disp: , Rfl: 11     PNV95/FERROUS FUMARATE/FA (PRENATAL ORAL), Take by mouth., Disp: , Rfl:      naproxen (NAPROSYN) 500 MG tablet, Take 1 tablet (500 mg total) by mouth 2 (two) times a day with meals for 14 days., Disp: 28 tablet, Rfl: 0    Family History   Problem Relation Age of Onset     Obesity Mother      Depression Father      Depression Sister      Depression Brother      Diabetes Brother         insulin     Depression Maternal Grandmother      Depression Maternal Grandfather      Depression Paternal Grandmother      Depression Paternal Grandfather      Cancer Paternal Grandfather         found late, mets, possible lung     Anesthesia problems Neg Hx        Social History     Socioeconomic History     Marital status:      Spouse name: Not on file     Number of children: Not on file     Years of education: Not on file     Highest education level: Not on file   Occupational History     Not on file   Social Needs     Financial resource strain: Not on file     Food insecurity     Worry: Not on file     Inability: Not on file     Transportation needs     Medical: Not on file     Non-medical: Not on file   Tobacco Use     Smoking status: Never Smoker     Smokeless tobacco: Never Used   Substance and Sexual Activity     Alcohol use: No     Alcohol/week: 2.5 standard drinks     Types: 3 Standard drinks or equivalent per week     Drug use: No     Sexual activity: Yes     Partners: Male     Birth control/protection: None   Lifestyle     Physical activity     Days per week: Not on file     Minutes per session: Not on file     Stress:  Not on file   Relationships     Social connections     Talks on phone: Not on file     Gets together: Not on file     Attends [employer] service: Not on file     Active member of club or organization: Not on file     Attends meetings of clubs or organizations: Not on file     Relationship status: Not on file     Intimate partner violence     Fear of current or ex partner: Not on file     Emotionally abused: Not on file     Physically abused: Not on file     Forced sexual activity: Not on file   Other Topics Concern     Not on file   Social History Narrative     Not on file       Review of Systems - 10 point Review of Systems is negative except for foot pain which is noted in HPI.    OBJECTIVE:  Appearance: alert, well appearing, and in no distress.    General appearance: Patient is alert and fully cooperative with history & exam.  No sign of distress is noted during the visit.     Psychiatric: Affect is pleasant & appropriate.  Patient appears motivated to improve health.     Respiratory: Breathing is regular & unlabored while sitting.     HEENT: Hearing is intact to spoken word.  Speech is clear.  No gross evidence of visual impairment that would impact ambulation.    Vascular: Dorsalis pedis and posterior tibial pulses are palpable. There is pedal hair growth bilateral.  CFT < 3 sec from anterior tibial surface to distal digits bilateral. There is no appreciable edema noted.  Dermatologic: Turgor and texture are within normal limits. No coloration or temperature changes. No primary or secondary lesions noted.  Neurologic: All epicritic and proprioceptive sensations are grossly intact bilateral.  Musculoskeletal: Mild pain along the plantar right arch along the course of the plantar fascia. Limited ankle dorsiflexion with knee extended and flexed. Mild pain along 5th metatarsal shaft right. Mild prominence of the 5th metatarsal head right.     Imaging:     No results found.

## 2021-06-14 NOTE — PROGRESS NOTES
Assessment:     1. Viral URI     2. Sore throat  Rapid Strep A Screen-Throat    Group A Strep, RNA Direct Detection, Throat          Plan:     Symptoms consistent with a viral upper respiratory infection.  Recommend symptomatic care.  No antibiotics indicated at this time.  Rapid strep screen is negative.  Recommend following up if symptoms are getting worse or not continuing to improve.    Subjective:       37 y.o. female presents for evaluation of a 1 day history of sore throat, headache, nasal congestion, and feeling like her lymph nodes are swollen in her neck, particularly underneath her chin on the right side.  She is 12 weeks gestation.  She denies any fever, cough, sore throat, ear pain, or facial pain.  She has not tried anything for her symptoms.    The following portions of the patient's history were reviewed and updated as appropriate: allergies, current medications, past family history, past medical history, past social history, past surgical history and problem list.    Review of Systems  A 12 point comprehensive review of systems was negative except as noted.     Objective:        Vitals:    11/21/17 1753   BP: 100/62   Pulse: 62   Temp: 98.3  F (36.8  C)   SpO2: 100%     General Appearance:    Alert, pleasant, cooperative, no distress, appears stated age   Head:    Normocephalic, without obvious abnormality, atraumatic   Eyes:    Conjunctiva/corneas clear   Ears:    Normal TM's without erythema or bulging. Ninfa external ear canals, both ears   Nose:   Nares normal, septum midline, mucosa normal, no drainage    or sinus tenderness   Throat:  Mild erythema noted in the posterior oropharynx without significant tonsillar hypertrophy or exudate seen.   Neck:      Cardiovascular:  Neck is supple with some mildly tender submandibular lymphadenopathy on the right side.  No other significant adenopathy noted.  Regular rate and rhythm, no murmurs, rubs, or gallops.   Lungs:     Clear to auscultation  bilaterally without wheezes, rales, or rhonchi, respirations unlabored    Recent Results (from the past 24 hour(s))   Rapid Strep A Screen-Throat   Result Value Ref Range    Rapid Strep A Antigen No Group A Strep detected, presumptive negative No Group A Strep detected, presumptive negative                               This note has been dictated using voice recognition software. Any grammatical or context distortions are unintentional and inherent to the software

## 2021-06-16 NOTE — PROGRESS NOTES
Assessment/Plan:     Patient presents to clinic with symptoms consistent with gallstones, although I do not suspect acute cholecystitis.  Will obtain a right upper quadrant ultrasound and a referral to general surgery for management options during pregnancy.  Considered obtaining lipid and glucose monitoring, but patient is pregnant, cannot take a statin at this time anyway, and is being managed by other specialties.  Will consider lab work when patient is delivered.  Discussed continued abstinence from benzodiazepines, patient is amenable.      USPSTF Recommendations for age 37:  - patient has been screened for cervical cancer per protocol in 2016, results were negative, next screening due in 2021  - discussed intimate partner violence and there are no concerns at this time  - of the recommended screening for chlamydia, HIV, syphilis, gonorrhea, and hepatitis, patient chose no screening  - discussed healthful diet and physical activity for CVD disease prevention; lipid and DM II screening was not performed as patient is being managed by other specialties, has recently had lab work, and is currently pregnant  - immunizations are up to date        Discontinued Medications:  Medications Discontinued During This Encounter   Medication Reason     ALPRAZolam (XANAX) 0.25 MG tablet      cholecalciferol, vitamin D3, 5,000 unit capsule      ergocalciferol (VITAMIN D2) 50,000 unit capsule      CHOLINE BITARTRATE ORAL      co-enzyme Q-10 30 mg capsule      cyanocobalamin, vitamin B-12, 1,000 mcg Subl      INOSITOL ORAL      phentermine (ADIPEX-P) 37.5 mg tablet Therapy completed     phentermine (ADIPEX-P) 37.5 mg tablet Therapy completed     MULTIVIT WITH IRON,HEMATINIC (SUPER B-COMPLEX ORAL)      traZODone (DESYREL) 50 MG tablet      vitamin E 400 unit capsule        AVS printed and given to patient.  Return to clinic in 14 weeks.    I have had an Advance Directives discussion with the patient.    Total time spent with  "patient was 40 minutes with greater than 50% spent in face-to-face counseling regarding the above plan.    This note has been dictated using voice recognition software. Any grammatical or context distortions are unintentional and inherent to the the software.     Andra Olsen MD  Family Medicine [facility]      Subjective:      Leah John is a 37 y.o. female who presents to clinic for establishment of care.    Patient suspects that she has gallstones.  She has never had symptoms like this before.  However, last week, on Tuesday she ate dinner and then immediately noticed some diarrhea and hyperactivity of the gastrointestinal tract with cramping.  The next day she expands nausea, pain, and vomiting.  The pain is located in the right upper quadrant.  She is trying to eat less and decrease her fatty food intake but even just thought of food makes her nauseated.    Patient also history of depression.  She is currently on fluoxetine which is managed by psychiatry.  She previously took Xanax to help with insomnia but is not taking that during this pregnancy.    She has a history of gastric bypass and is managed by gastroenterology for all electrolyte and vitamin deficiencies.      Old records reviewed:   Progress notes from November 21, 2017, November 2, 2017, and January 12, 2017    Past Medical History, Family History, and Social History reviewed.   History   Smoking Status     Never Smoker   Smokeless Tobacco     Never Used       Review of systems is as stated in HPI.  Patient endorses: fatigue, spots in vision (occured after vomiting exclusively), dizziness (orthostatic), SOB (related to pregnancy), swelling in legs, nausea/vomiting/diarrhea/constipation, back pain, incontinence, increased urination   The remainder of the 10 system review is otherwise negative.    Objective:     BP 98/60  Pulse 96  Ht 5' 0.25\" (1.53 m)  Wt (!) 262 lb (118.8 kg)  LMP 08/15/2017 (Approximate)  SpO2 98%  BMI 50.74 " kg/m2 Body mass index is 50.74 kg/(m^2).    Gen: Alert, NAD, appears stated age, normal hygiene   Eyes: conjunctivae without injection, sclera clear, EOMI  ENT/mouth: nares clear, septum midline, absent rhinorrhea, pharyngeal injection absent, neck is supple, no thyroid enlargement, small uvula  CV: RRR, no murmur appreciated, pedal edema absent bilaterally  Resp: CTAB, no wheezes, rales or ronchi  ABD: normoactive, non-tender to palpation, negative gan's sign, gravid  MSK: grossly full range of motion in all joints, no obvious deformity  Neuro: CN II-XII grossly intact, no deficits in coordination  Psych: no apparent hallucinations or delusions, no pressured speech; alert, oriented x3  SKIN: dry and without lesions  Heme/lymph: no pallor, no active bleeding/bruising, no adenopathy appreciated      Current Outpatient Prescriptions on File Prior to Visit   Medication Sig Dispense Refill     aspirin 81 mg chewable tablet Chew 81 mg daily.       ferrous sulfate (IRON) 325 (65 FE) MG tablet Take 1 tablet by mouth daily with breakfast.       FLUoxetine (PROZAC) 20 MG tablet Take 1 tablet by mouth daily.  11     levothyroxine (SYNTHROID, LEVOTHROID) 50 MCG tablet Take 50 mcg by mouth.       PNV95/FERROUS FUMARATE/FA (PRENATAL ORAL) Take by mouth.       [DISCONTINUED] ALPRAZolam (XANAX) 0.25 MG tablet Take 1 tablet by mouth as needed.  2     [DISCONTINUED] cholecalciferol, vitamin D3, 5,000 unit capsule Take 1 capsule by mouth daily.       [DISCONTINUED] CHOLINE BITARTRATE ORAL Take 1 tablet by mouth daily.       [DISCONTINUED] co-enzyme Q-10 30 mg capsule Take 30 mg by mouth daily.       [DISCONTINUED] cyanocobalamin, vitamin B-12, 1,000 mcg Subl Place 1,000 mcg under the tongue daily.       [DISCONTINUED] ergocalciferol (VITAMIN D2) 50,000 unit capsule Take 50,000 Units by mouth every 7 days.       [DISCONTINUED] INOSITOL ORAL Take 4,000 mg by mouth daily.       [DISCONTINUED] MULTIVIT WITH IRON,HEMATINIC (SUPER  B-COMPLEX ORAL) Take 1 capsule by mouth daily.       [DISCONTINUED] phentermine (ADIPEX-P) 37.5 mg tablet Take 1/2 to 1 tablet in the morning. 90 tablet 1     [DISCONTINUED] phentermine (ADIPEX-P) 37.5 mg tablet Take 1/2 to 1 tablet in the morning. 90 tablet 1     [DISCONTINUED] traZODone (DESYREL) 50 MG tablet Take 1 tablet by mouth bedtime as needed.  11     [DISCONTINUED] vitamin E 400 unit capsule Take 1 capsule by mouth daily.       No current facility-administered medications on file prior to visit.

## 2021-06-16 NOTE — PROGRESS NOTES
Assessment and Plan   1. Breast pain  Unknown etiology at this time for breast pain.  Exam reassuring today and benign.  Recommended conservative treatment with NSAIDs/Tylenol, ice and heat and watchful waiting.  Discussed symptoms to return to clinic or follow-up in a week if symptoms have not improved.  Would consider imaging at that time such as a mammogram.    Follow up: PRN  Options for treatment and follow-up care were reviewed with the patient and/or guardian. Leah John and/or guardian engaged in the decision making process and verbalized understanding of the options discussed and agreed with the final plan.    Dr. Bolivar Faust MD         HPI:   Leah John is a 40 y.o.  female with problems as below, who presents for:    Chief Complaint   Patient presents with     Establish Care     Concerns     pos thrush or mastitis of lft breast. going on for one day.      Shooting pain on the left mostly but also on the right occasionally.  Going on for a couple days. Breastfeeding 4 times a day. Some pain with that now in left breast.. Has not noticed skin changes but  stated it looked red.  No abnormal discharge from the nipple.          PMHX:     Patient Active Problem List   Diagnosis     Low back pain     Knee pain     Foot pain     Anxiety     Dyslipidemia     Menstrual disorder     Pre-diabetes     Metabolic syndrome     Morbid obesity with BMI of 50.0-59.9, adult (H)     S/P bariatric surgery     MTHFR mutation (H)     Iron deficiency anemia     Anemia affecting pregnancy in third trimester     Hyperparathyroidism (H)     Vitamin D deficiency     Low vitamin B12 level < 400       Current Outpatient Medications   Medication Sig Dispense Refill     FLUoxetine (PROZAC) 20 MG tablet Take 1 tablet by mouth daily.  11     PNV95/FERROUS FUMARATE/FA (PRENATAL ORAL) Take by mouth.       No current facility-administered medications for this visit.        Social History     Tobacco Use     Smoking  status: Never Smoker     Smokeless tobacco: Never Used   Substance Use Topics     Alcohol use: No     Alcohol/week: 2.5 standard drinks     Types: 3 Standard drinks or equivalent per week     Drug use: No       Social History     Social History Narrative     Not on file       Allergies   Allergen Reactions     Sulfa (Sulfonamide Antibiotics) Other (See Comments)     Pain              Review of Systems:    Complete ROS is negative except as noted in the HPI         Physical Exam:   /68 (Patient Site: Left Arm, Patient Position: Sitting, Cuff Size: Adult Large)   Pulse 66   Ht 5' (1.524 m)   Wt (!) 279 lb 14.4 oz (127 kg)   SpO2 95%   BMI 54.66 kg/m      General appearance: Alert, cooperative, no distress, appears stated age  Head: Normocephalic, atraumatic, without obvious abnormality  Eyes: Pupils equal round, reactive.  Conjunctiva clear.  Neck: Supple, symmetric, trachea midline, no adenopathy.  Lungs: Clear to auscultation bilaterally, no wheezing or crackles present.  Respirations unlabored  Heart: Regular rate and rhythm, normal S1 and S2, no murmur, rub or gallop.  Uro/Gyn: Breasts on inspection are symmetric, without erythema or skin lesions. Palpation shows no masses or lumps on bilateral exam. No tenderness or nipple discharge noted.

## 2021-06-18 NOTE — ANESTHESIA CARE TRANSFER NOTE
Last vitals:   Vitals:    05/30/18 1046   BP: 99/56   Pulse: 60   Resp: 16   Temp: 36.5  C (97.7  F)   SpO2: 95%     Patient's level of consciousness is awake  Spontaneous respirations: yes  Maintains airway independently: yes  Dentition unchanged: yes  Oropharynx: oropharynx clear of all foreign objects    QCDR Measures:  ASA# 20 - Surgical Safety Checklist: WHO surgical safety checklist completed prior to induction  PQRS# 430 - Adult PONV Prevention: 4558F - Pt received => 2 anti-emetic agents (different classes) preop & intraop  ASA# 8 - Peds PONV Prevention: NA - Not pediatric patient, not GA or 2 or more risk factors NOT present  PQRS# 424 - Debra-op Temp Management: 4559F - At least one body temp DOCUMENTED => 35.5C or 95.9F within required timeframe  PQRS# 426 - PACU Transfer Protocol: - Transfer of care checklist used  ASA# 14 - Acute Post-op Pain: ASA14B - Patient did NOT experience pain >= 7 out of 10

## 2021-06-18 NOTE — ANESTHESIA PROCEDURE NOTES
Spinal Block    Patient location during procedure: OR  Start time: 5/30/2018 9:40 AM  End time: 5/30/2018 9:44 AM  Reason for block: primary anesthetic    Staffing:  Performing  Anesthesiologist: HÉCTOR MCNAIR    Preanesthetic Checklist  Completed: patient identified, risks, benefits, and alternatives discussed, timeout performed, consent obtained, airway assessed, oxygen available, suction available, emergency drugs available and hand hygiene performed  Spinal Block  Patient position: sitting  Prep: ChloraPrep  Patient monitoring: heart rate, cardiac monitor, continuous pulse ox and blood pressure  Approach: midline  Location: L3-4  Injection technique: single-shot  Needle type: pencil-tip   Needle gauge: 24 G      Additional Notes:  Negative paresthesia or heme    Lot [id number]

## 2021-06-18 NOTE — ANESTHESIA POSTPROCEDURE EVALUATION
Patient: Leah John  PRIMARY  SECTION  Anesthesia type: spinal    Patient location: Labor and Delivery  Last vitals:   Vitals:    18 1300   BP: 91/47   Pulse: (!) 55   Resp: 16   Temp:    SpO2: 97%     Post vital signs: stable  Level of consciousness: awake and responds to simple questions  Post-anesthesia pain: pain controlled  Post-anesthesia nausea and vomiting: no  Pulmonary: unassisted, return to baseline  Cardiovascular: stable and blood pressure at baseline  Hydration: adequate  Anesthetic events: no    QCDR Measures:  ASA# 11 - Debra-op Cardiac Arrest: ASA11B - Patient did NOT experience unanticipated cardiac arrest  ASA# 12 - Debra-op Mortality Rate: ASA12B - Patient did NOT die  ASA# 13 - PACU Re-Intubation Rate: NA - No ETT / LMA used for case  ASA# 10 - Composite Anes Safety: ASA10A - No serious adverse event    Additional Notes:

## 2021-06-18 NOTE — ANESTHESIA PREPROCEDURE EVALUATION
Anesthesia Evaluation      Patient summary reviewed     Airway   Mallampati: I  Neck ROM: full   Pulmonary - negative ROS and normal exam                          Cardiovascular - negative ROS and normal exam   Neuro/Psych - negative ROS     Endo/Other    (+) hypothyroidism, obesity, pregnant     GI/Hepatic/Renal    (+) GERD intermittent,             Dental - normal exam                        Anesthesia Plan  Planned anesthetic: spinal    ASA 3     Anesthetic plan and risks discussed with: patient

## 2021-06-21 NOTE — PROGRESS NOTES
Chief Complaint   Patient presents with     Facial Swelling     LYMPHNODES RIGHT SIDE SINCE 10/26, GETTING WORSE         HPI    Patient is here for 2 days of tenderness and swelling at right upper neck. No fever, chills, cough, sore throat, ear pain.    ROS: Pertinent ROS noted in HPI.     Allergies   Allergen Reactions     Sulfa (Sulfonamide Antibiotics) Other (See Comments)     Pain       Patient Active Problem List   Diagnosis     Low back pain     Knee pain     Foot pain     Anxiety     Dyslipidemia     Menstrual disorder     Pre-diabetes     Metabolic syndrome     Morbid obesity with BMI of 50.0-59.9, adult (H)     S/P bariatric surgery     MTHFR mutation (H)     Iron deficiency anemia     Anemia affecting pregnancy in third trimester       Family History   Problem Relation Age of Onset     Obesity Mother      Depression Father      Depression Sister      Depression Brother      Diabetes Brother      insulin     Depression Maternal Grandmother      Depression Maternal Grandfather      Depression Paternal Grandmother      Depression Paternal Grandfather      Cancer Paternal Grandfather      found late, mets, possible lung     Anesthesia problems Neg Hx        Social History     Social History     Marital status:      Spouse name: N/A     Number of children: N/A     Years of education: N/A     Occupational History     Not on file.     Social History Main Topics     Smoking status: Never Smoker     Smokeless tobacco: Never Used     Alcohol use No     Drug use: No     Sexual activity: Yes     Partners: Male     Birth control/ protection: None     Other Topics Concern     Not on file     Social History Narrative         Objective:    Vitals:    10/28/18 0824   BP: 104/64   Pulse: 69   Resp: 16   Temp: 97.9  F (36.6  C)   SpO2: 97%       Gen:NAD  Throat: oropharynx clear, tonsils normal  Ears: TMs clear without effusion, ear canals normal with minimal cerumen  Nose: no discharge  Neck: There is a 0.5 cm mole  at right submantle area with tenderness to palpation without underlying mass, fluctuance nor surrounding erythema. There is another 0.3 mm mole below the first one. No evidence of cervical adenopathy. Full ROM of neck in all planes.   CV: RRR, no M, R, G  Pulm: CTAB      Recent Results (from the past 24 hour(s))   HM1 (CBC with Diff)   Result Value Ref Range    WBC 7.3 4.0 - 11.0 thou/uL    RBC 4.67 3.80 - 5.40 mill/uL    Hemoglobin 13.4 12.0 - 16.0 g/dL    Hematocrit 42.3 35.0 - 47.0 %    MCV 91 80 - 100 fL    MCH 28.7 27.0 - 34.0 pg    MCHC 31.7 (L) 32.0 - 36.0 g/dL    RDW 12.6 11.0 - 14.5 %    Platelets 307 140 - 440 thou/uL    MPV 10.6 8.5 - 12.5 fL    Neutrophils % 51 50 - 70 %    Lymphocytes % 38 20 - 40 %    Monocytes % 6 2 - 10 %    Eosinophils % 4 0 - 6 %    Basophils % 1 0 - 2 %    Neutrophils Absolute 3.7 2.0 - 7.7 thou/uL    Lymphocytes Absolute 2.8 0.8 - 4.4 thou/uL    Monocytes Absolute 0.4 0.0 - 0.9 thou/uL    Eosinophils Absolute 0.3 0.0 - 0.4 thou/uL    Basophils Absolute 0.1 0.0 - 0.2 thou/uL           Neck pain on right side  -     HM1(CBC and Differential)  -     HM1 (CBC with Diff)      CBC /w differential essentially normal.  Possible tenderness from the mole, but there is no unusual appearance of the mole. No evidence of infection. Advised close monitoring and f/u with PCP if symptoms escalate or fail to improve.

## 2021-06-22 NOTE — PROGRESS NOTES
Bariatric Follow Up Visit with a History of Previous Bariatric Surgery     Date of visit: 12/13/2018  Physician: Colleen Hahn MD  Primary Care Provider:  Justine Johnson MD Deborah M Bifulk   38 y.o.  female    Date of Surgery: 7/15/2015  Initial Weight: 311  Initial BMI: 59.72  Today's Weight:   Wt Readings from Last 1 Encounters:   12/13/18 (!) 250 lb (113.4 kg)     Body mass index is 47.24 kg/m .      Assessment and Plan     Assessment: Leah is a 38 y.o. year old female who is 3.5 years s/p  Pavithra en Y Gastric Bypass with Dr. Charline John feels as if she has achieved the goals she hoped to accomplish through bariatric surgery and weight loss.  She is 6 months post partum. Daughter Shy May  Encounter Diagnosis   Name Primary?     Postoperative malabsorption Yes         Current Outpatient Medications:      FLUoxetine (PROZAC) 20 MG tablet, Take 1 tablet by mouth daily., Disp: , Rfl: 11     PNV95/FERROUS FUMARATE/FA (PRENATAL ORAL), Take by mouth., Disp: , Rfl:     Plan: labs ordered. Encouraged vitamin intake with consistency and taking a few minutes to walk or time for yourself. Annual follow up. Dietitian prn.    Return in about 1 year (around 12/13/2019).    Bariatric Surgery Review     Interim History/LifeChanges: She had IUI and is 6 months post partum. She gained 30# on clomid and not much more during pregnancy.     Patient Concerns: weight  Appetite (1-10): high  GERD: no    Medication changes: bad at taking vitamins    Vitamin Intake:   B-12   SL   MVI  PNV 2 when taking   Vitamin D  5000   Calcium   no     Other                LABS: ordered    Nausea no  Vomiting no  Constipation no  Diarrhea no  Rashes no  Hair Loss no  Calf tenderness no  Breathing difficulty no  Reactive Hypoglycemia yes  Light Headedness no   Moods had PP anxiety    12 point ROS as above and otherwise negative      Habits:  Alcohol: no  Tobacco: no  Caffeine no  NSAIDS no  Exercise Routine: walked in the  summer  3 meals/day yes  Protein first yes  60grams/day  Water Separate from meals yes  Calorie Containing Beverages no  Restaurant eating/wk 0-2  Sleeping OK despite sleeping with her baby and breastfeeding every 2 hours  Stress OK  CPAP: NA  Contraception: breastfeeding and had infertility    Social History     Social History     Socioeconomic History     Marital status:      Spouse name: Not on file     Number of children: Not on file     Years of education: Not on file     Highest education level: Not on file   Social Needs     Financial resource strain: Not on file     Food insecurity - worry: Not on file     Food insecurity - inability: Not on file     Transportation needs - medical: Not on file     Transportation needs - non-medical: Not on file   Occupational History     Not on file   Tobacco Use     Smoking status: Never Smoker     Smokeless tobacco: Never Used   Substance and Sexual Activity     Alcohol use: No     Alcohol/week: 1.5 oz     Types: 3 Standard drinks or equivalent per week     Drug use: No     Sexual activity: Yes     Partners: Male     Birth control/protection: None   Other Topics Concern     Not on file   Social History Narrative     Not on file       Past Medical History     Past Medical History:   Diagnosis Date     Anisometropia      Anxiety      Cervical intraepithelial neoplasia III 2013     Depression     20s; on medications zoloft; prozac x 1 year     Dyslipidemia 2014     Hypothyroidism      Metabolic syndrome      Morbid obesity (H)      MTHFR mutation (H)     heterozygous     Polycystic ovary syndrome      Pre-diabetes      Prothrombin gene mutation (H) 01/17/2017    heterozygous     Strabismus      Problem List     Patient Active Problem List   Diagnosis     Low back pain     Knee pain     Foot pain     Anxiety     Dyslipidemia     Menstrual disorder     Pre-diabetes     Metabolic syndrome     Morbid obesity with BMI of 50.0-59.9, adult (H)     S/P bariatric surgery      "MTHFR mutation (H)     Iron deficiency anemia     Anemia affecting pregnancy in third trimester     Medications     Current Outpatient Medications   Medication Sig Note     FLUoxetine (PROZAC) 20 MG tablet Take 1 tablet by mouth daily. 2016: Received from: External Pharmacy Received Sig: TAKE 1 T PO EVERY MORNING     PNV95/FERROUS FUMARATE/FA (PRENATAL ORAL) Take by mouth.      Surgical History     Past Surgical History  She has a past surgical history that includes Ganglion cyst excision (Right); Eye surgery; Cervical biopsy w/ loop electrode excision (); Gastric bypass; PRIMARY  SECTION (N/A, 2018); and GASTRIC BYPASS LAPAROSCOPIC CRISTIANE-EN-Y (N/A, 7/15/2015).    Objective-Exam     Constitutional:  /75   Pulse 63   Resp 18   Ht 5' 1\" (1.549 m)   Wt (!) 250 lb (113.4 kg)   SpO2 95%   Breastfeeding? Yes   BMI 47.24 kg/m    Height: 5' 1\" (1.549 m) (2018  8:51 AM)  Initial Weight: 311 lbs (2018  8:51 AM)  Weight: (!) 250 lb (113.4 kg) (2018  8:51 AM)  Weight loss from initial: 61 (2018  8:51 AM)  % Weight loss: 19.61 % (2018  8:51 AM)  BMI (Calculated): 47.3 (2018  8:51 AM)  SpO2: 95 % (2018  8:51 AM)    General:  Pleasant and in no acute distress   Eyes:  EOMI  ENT:  Airway 1+  Moist mucous membranes  Neck:  Supple, No LAD, No thyromegaly, No carotid bruits appreciated  Respiratory: Normal respiratory effort, no cough, wheezes or crackles  CV:  Regular rate and Rhythm,nomurmurs, pulses 2+, no calf tenderness, no LE edema  Gastrointestinal: Abdomen NT/ND, BS+  Musculoskeletal: muscle mass WNL  Skin: color fair/pink hair full, incisions nicely healed  Neurological: No tremor, normal gait  Psychiatric: alert and oriented X3, mood and affect normal    Counseling     We reviewed the important post op bariatric recommendations:  -eating 3 meals daily  -eating protein first, getting >60gm protein daily  -eating slowly, chewing food " well  -avoiding/limiting calorie containing beverages  -drinking water 15-30 minutes before or after meals  -choosing wheat, not white with breads, crackers, pastas, dinorah, bagels, tortillas, rice  -limiting restaurant or cafeteria eating to twice a week or less    We discussed the importance of restorative sleep and stress management in maintaining a healthy weight.  We discussed the National Weight Control Registry healthy weight maintenance strategies and ways to optimize metabolism.  We discussed the importance of physical activity including cardiovascular and strength training in maintaining a healthier weight.    We discussed the importance of life-long vitamin supplementation and life-long  follow-up.    Leah was reminded that, to avoid marginal ulcers she should avoid tobacco at all, alcohol in excess, caffeine in excess, and NSAIDS (unless indicated for cardioprotection or othewise and opposed by a PPI).    Colleen Hahn MD, [facility].  12/13/2018  9:38 AM      No images are attached to the encounter.   30 minutes spent with patient. >50% in counseling and coordination of care.

## 2021-08-17 ENCOUNTER — OFFICE VISIT (OUTPATIENT)
Dept: FAMILY MEDICINE | Facility: CLINIC | Age: 41
End: 2021-08-17
Payer: COMMERCIAL

## 2021-08-17 VITALS
OXYGEN SATURATION: 98 % | HEIGHT: 61 IN | SYSTOLIC BLOOD PRESSURE: 108 MMHG | HEART RATE: 71 BPM | DIASTOLIC BLOOD PRESSURE: 78 MMHG | BODY MASS INDEX: 51.73 KG/M2 | WEIGHT: 274 LBS

## 2021-08-17 DIAGNOSIS — Z12.31 ENCOUNTER FOR SCREENING MAMMOGRAM FOR BREAST CANCER: ICD-10-CM

## 2021-08-17 DIAGNOSIS — Z11.59 NEED FOR HEPATITIS C SCREENING TEST: ICD-10-CM

## 2021-08-17 DIAGNOSIS — E66.01 MORBID OBESITY WITH BMI OF 50.0-59.9, ADULT (H): ICD-10-CM

## 2021-08-17 DIAGNOSIS — Z11.4 SCREENING FOR HIV (HUMAN IMMUNODEFICIENCY VIRUS): ICD-10-CM

## 2021-08-17 DIAGNOSIS — E03.9 HYPOTHYROIDISM, UNSPECIFIED TYPE: ICD-10-CM

## 2021-08-17 DIAGNOSIS — K21.9 GASTROESOPHAGEAL REFLUX DISEASE WITHOUT ESOPHAGITIS: ICD-10-CM

## 2021-08-17 DIAGNOSIS — E78.5 HYPERLIPIDEMIA WITH TARGET LDL LESS THAN 160: ICD-10-CM

## 2021-08-17 DIAGNOSIS — N89.8 VAGINAL DISCHARGE: ICD-10-CM

## 2021-08-17 DIAGNOSIS — N91.5 OLIGOMENORRHEA, UNSPECIFIED TYPE: ICD-10-CM

## 2021-08-17 DIAGNOSIS — R79.89 LOW VITAMIN B12 LEVEL: ICD-10-CM

## 2021-08-17 DIAGNOSIS — Z12.4 SCREENING FOR MALIGNANT NEOPLASM OF CERVIX: ICD-10-CM

## 2021-08-17 DIAGNOSIS — D50.8 IRON DEFICIENCY ANEMIA SECONDARY TO INADEQUATE DIETARY IRON INTAKE: ICD-10-CM

## 2021-08-17 DIAGNOSIS — R73.03 PRE-DIABETES: Primary | ICD-10-CM

## 2021-08-17 DIAGNOSIS — E21.3 HYPERPARATHYROIDISM (H): ICD-10-CM

## 2021-08-17 DIAGNOSIS — E55.9 VITAMIN D DEFICIENCY: ICD-10-CM

## 2021-08-17 PROBLEM — Z15.89 MTHFR MUTATION: Status: ACTIVE | Noted: 2021-08-17

## 2021-08-17 PROBLEM — E88.810 METABOLIC SYNDROME: Status: ACTIVE | Noted: 2021-08-17

## 2021-08-17 PROBLEM — F41.9 ANXIETY: Status: ACTIVE | Noted: 2021-08-17

## 2021-08-17 PROBLEM — D50.9 IRON DEFICIENCY ANEMIA: Status: ACTIVE | Noted: 2018-01-20

## 2021-08-17 LAB
CHOLEST SERPL-MCNC: 190 MG/DL
CLUE CELLS: NORMAL
ERYTHROCYTE [DISTWIDTH] IN BLOOD BY AUTOMATED COUNT: 14.9 % (ref 10–15)
FASTING STATUS PATIENT QL REPORTED: YES
FSH SERPL-ACNC: 3.3 MIU/ML
HBA1C MFR BLD: 5.5 % (ref 0–5.6)
HCT VFR BLD AUTO: 34.2 % (ref 35–47)
HDLC SERPL-MCNC: 53 MG/DL
HGB BLD-MCNC: 10.8 G/DL (ref 11.7–15.7)
HIV 1+2 AB+HIV1 P24 AG SERPL QL IA: NEGATIVE
LDLC SERPL CALC-MCNC: 120 MG/DL
MCH RBC QN AUTO: 24.2 PG (ref 26.5–33)
MCHC RBC AUTO-ENTMCNC: 31.6 G/DL (ref 31.5–36.5)
MCV RBC AUTO: 77 FL (ref 78–100)
PLATELET # BLD AUTO: 329 10E3/UL (ref 150–450)
PTH-INTACT SERPL-MCNC: 110 PG/ML (ref 10–86)
RBC # BLD AUTO: 4.46 10E6/UL (ref 3.8–5.2)
TRICHOMONAS, WET PREP: NORMAL
TRIGL SERPL-MCNC: 85 MG/DL
TSH SERPL DL<=0.005 MIU/L-ACNC: 4.74 UIU/ML (ref 0.3–5)
VIT B12 SERPL-MCNC: 215 PG/ML (ref 213–816)
WBC # BLD AUTO: 7.1 10E3/UL (ref 4–11)
WBC'S/HIGH POWER FIELD, WET PREP: NORMAL
YEAST, WET PREP: NORMAL

## 2021-08-17 PROCEDURE — 84443 ASSAY THYROID STIM HORMONE: CPT | Performed by: FAMILY MEDICINE

## 2021-08-17 PROCEDURE — 83970 ASSAY OF PARATHORMONE: CPT | Performed by: FAMILY MEDICINE

## 2021-08-17 PROCEDURE — 99396 PREV VISIT EST AGE 40-64: CPT | Performed by: FAMILY MEDICINE

## 2021-08-17 PROCEDURE — 83036 HEMOGLOBIN GLYCOSYLATED A1C: CPT | Performed by: FAMILY MEDICINE

## 2021-08-17 PROCEDURE — 83001 ASSAY OF GONADOTROPIN (FSH): CPT | Performed by: FAMILY MEDICINE

## 2021-08-17 PROCEDURE — 87210 SMEAR WET MOUNT SALINE/INK: CPT | Performed by: FAMILY MEDICINE

## 2021-08-17 PROCEDURE — 80061 LIPID PANEL: CPT | Performed by: FAMILY MEDICINE

## 2021-08-17 PROCEDURE — 82607 VITAMIN B-12: CPT | Performed by: FAMILY MEDICINE

## 2021-08-17 PROCEDURE — 85027 COMPLETE CBC AUTOMATED: CPT | Performed by: FAMILY MEDICINE

## 2021-08-17 PROCEDURE — 87624 HPV HI-RISK TYP POOLED RSLT: CPT | Performed by: FAMILY MEDICINE

## 2021-08-17 PROCEDURE — 87389 HIV-1 AG W/HIV-1&-2 AB AG IA: CPT | Performed by: FAMILY MEDICINE

## 2021-08-17 PROCEDURE — 86803 HEPATITIS C AB TEST: CPT | Performed by: FAMILY MEDICINE

## 2021-08-17 PROCEDURE — 80053 COMPREHEN METABOLIC PANEL: CPT | Performed by: FAMILY MEDICINE

## 2021-08-17 PROCEDURE — 82306 VITAMIN D 25 HYDROXY: CPT | Performed by: FAMILY MEDICINE

## 2021-08-17 PROCEDURE — 36415 COLL VENOUS BLD VENIPUNCTURE: CPT | Performed by: FAMILY MEDICINE

## 2021-08-17 PROCEDURE — G0123 SCREEN CERV/VAG THIN LAYER: HCPCS | Performed by: FAMILY MEDICINE

## 2021-08-17 PROCEDURE — 99213 OFFICE O/P EST LOW 20 MIN: CPT | Mod: 25 | Performed by: FAMILY MEDICINE

## 2021-08-17 ASSESSMENT — MIFFLIN-ST. JEOR: SCORE: 1842.3

## 2021-08-17 NOTE — PROGRESS NOTES
wet  Assessment/Plan:     Patient presents today for routine physical examination.    Healthcare Maintenance: USPSTF recommendations for age 40:  Patient has been counseled on/screened for:  - the benefits of supplemental folic acid   - intimate partner violence and there are no concerns at this time  - a healthful diet and physical activity for CVD prevention  - Diabetes and hyperlipidemia: screening was performed.   Sexually transmitted infections: Patient would not like to be screened for chlamydia, gonorrhea, syphilis, HIV, and hepatitis  Immunizations: up to date   Cervical Cancer Screening: due today  Mammogram: ordered today        Additional concerns are as detailed below:    1. Pre-diabetes  - **A1C FUTURE 3mo    2. Vaginal discharge  - Wet prep - Clinic Collect    3. Morbid obesity with BMI of 50.0-59.9, adult (H)  Pt desires to lose weight, has already had gastric bypass  - Comprehensive Weight Management; Future    4. Hyperlipidemia with target LDL less than 160  - Lipid panel reflex to direct LDL Fasting    5. Vitamin D deficiency  - Vitamin D Deficiency    6. Hyperparathyroidism (H)  - Parathyroid Hormone Intact    7. Hypothyroidism, unspecified type  - TSH with free T4 reflex    8. Low vitamin B12 level  Especially in the setting of previous gastric bypass  - Vitamin B12    9. Iron deficiency anemia secondary to inadequate dietary iron intake  - CBC with platelets    10. Oligomenorrhea, unspecified type  - Follicle stimulating hormone    11. Acid reflux  Recommended tums, offered famotidine but was declined at this time    12. Insomnia:  Recommended melatonin and unisom, patient is lactating so would not recommend other agents at this time.      AVS printed and given to patient.  Return to clinic in 1 year.    I have had an Advance Directives discussion with the patient.    This note has been dictated using voice recognition software. Any grammatical or context distortions are unintentional and  "inherent to the the software.     Andra Olsen MD  Family Medicine [facility]    Subjective:     Leah John is a 41 year old female who presents to clinic for routine physical.    Additional concerns include:    1. Heartburn, even from drinking water  2. Insomnia, trouble falling asleep; wakes up at three AM every day  3. Weight concerns, already had gastric bypass    PHQ-2 Score:  0    Health Care Directive: discussed    Patient Care Team:   PCP: Andra Olsen     Past Medical History, Family History, and Social History reviewed.     Review of systems:  Patient endorses: spots in vision, pain with sex  The remainder of the 10 system review is otherwise negative.    Objective:     /78   Pulse 71   Ht 1.537 m (5' 0.5\")   Wt 124.3 kg (274 lb)   SpO2 98%   BMI 52.63 kg/m    Gen: Alert, NAD, appears stated age, normal hygiene   Eyes: conjunctivae without injection, sclera clear, EOMI  ENT/mouth: nares clear, septum midline, absent rhinorrhea,absent pharyngeal injection, neck is supple, no thyroid enlargement  CV: RRR, no murmur appreciated, pedal edema absent bilaterally  Resp: CTAB, no wheezes, rales or ronchi  ABD: normoactive, non-tender to palpation, nondistended  MSK: grossly full range of motion in all joints, no obvious deformity  Neuro: CN II-XII grossly intact, no deficits in coordination  Psych: no apparent hallucinations or delusions, no pressured speech; alert, oriented x3  SKIN: dry and without lesions  Heme/lymph: no pallor, no active bleeding/bruising, no adenopathy appreciated  :  - external genitalia: normal appearance, no lesions, no flattening of the anatomic structures  - urethral meatus: without prolapse, no obvious irritation  - vagina: expected resilience given estrogen status, no cystocele or rectocele, normal discharge  - cervix: normal, no lesions, thick discharge  - uterus: anteverted  - anus and perineum: normal and without lesions  Breast:  - lactating, no " masses appreciated, nontender to palpation      Medications:  Current Outpatient Medications   Medication     B-12, Methylcobalamin, 1000 MCG SUBL     cholecalciferol (VITAMIN D3) 5000 UNITS CAPS capsule     cyanocobalamin 1000 MCG SUBL sublingual tablet     FLUoxetine 20 MG tablet     Prenatal Vit-Fe Fumarate-FA (PRENATAL MULTIVITAMIN  WITH IRON) 28-0.8 MG TABS     No current facility-administered medications for this visit.       Allergies:  Allergies   Allergen Reactions     Sulfa Drugs Unknown       PMH:  Past Medical History:   Diagnosis Date     Anisometropia      Anxiety      Cervical intraepithelial neoplasia III      Depression     20s; on medications zoloft; prozac x 1 year     Dyslipidemia      Hypothyroidism      Metabolic syndrome      Morbid obesity (H)      MTHFR mutation (H)     heterozygous     PCOS (polycystic ovarian syndrome)      Polycystic ovary syndrome      Pre-diabetes      Prothrombin gene mutation (H) 2017    heterozygous     Strabismus        PSH:  Past Surgical History:   Procedure Laterality Date     AS GASTRIC BYPASS,OBESE<100CM CRISTIANE-EN-Y  2015     BIOPSY CERVICAL, LOCAL EXCISION, SINGLE/MULTIPLE      ALFRED III      SECTION N/A 2018    Procedure: PRIMARY  SECTION;  Surgeon: Becky Rg MD;  Location: [facility] OR;  Service:      EXCISE GANGLION WRIST Right      EYE SURGERY      x3; as a child     GASTRIC BYPASS       LAPAROSCOPIC BYPASS GASTRIC       LEEP TX, CERVICAL       MI LAP GASTRIC BYPASS/CRISTIANE-EN-Y N/A 7/15/2015    Mark Olivier MD; [facility]       Family Hx:  Family History   Problem Relation Age of Onset     Obesity Mother      Depression Father      Depression Sister      Depression Brother      Diabetes Brother         insulin     Depression Maternal Grandmother      Depression Maternal Grandfather      Depression Paternal Grandmother      Depression Paternal Grandfather      Cancer Paternal Grandfather         found  late, mets, possible lung     Anesthesia Reaction No family hx of        Social History:  Social History     Socioeconomic History     Marital status:      Spouse name: Not on file     Number of children: Not on file     Years of education: Not on file     Highest education level: Not on file   Occupational History     Not on file   Tobacco Use     Smoking status: Never Smoker     Smokeless tobacco: Never Used   Substance and Sexual Activity     Alcohol use: No     Alcohol/week: 2.5 standard drinks     Comment: armen     Drug use: No     Sexual activity: Yes     Partners: Male     Birth control/protection: I.U.D., None   Other Topics Concern     Not on file   Social History Narrative     Not on file     Social Determinants of Health     Financial Resource Strain:      Difficulty of Paying Living Expenses:    Food Insecurity:      Worried About Running Out of Food in the Last Year:      Ran Out of Food in the Last Year:    Transportation Needs:      Lack of Transportation (Medical):      Lack of Transportation (Non-Medical):    Physical Activity:      Days of Exercise per Week:      Minutes of Exercise per Session:    Stress:      Feeling of Stress :    Social Connections:      Frequency of Communication with Friends and Family:      Frequency of Social Gatherings with Friends and Family:      Attends Hinduism Services:      Active Member of Clubs or Organizations:      Attends Club or Organization Meetings:      Marital Status:    Intimate Partner Violence:      Fear of Current or Ex-Partner:      Emotionally Abused:      Physically Abused:      Sexually Abused:        No components found for: LDLCALC     Immunization History   Administered Date(s) Administered     COVID-19,PF,Moderna 02/25/2021, 03/25/2021     DT (PEDS <7y) 05/17/2005     FLU 6-35 months 10/26/2009, 10/03/2013     Flu, Unspecified 10/03/2013, 01/15/2015, 10/12/2016, 11/01/2017     Influenza Vaccine IM > 6 months Valent IIV4 01/15/2015,  09/24/2015, 09/21/2020     Influenza Vaccine, 6+MO IM (QUADRIVALENT W/PRESERVATIVES) 10/12/2016, 10/04/2018, 09/25/2019     Td (Adult), Adsorbed 08/01/2003, 01/01/2004, 05/17/2005     Td,adult,historic,unspecified 08/01/2003, 05/17/2005     Tdap (Adacel,Boostrix) 06/25/2012

## 2021-08-18 LAB
DEPRECATED CALCIDIOL+CALCIFEROL SERPL-MC: 24 UG/L (ref 30–80)
HCV AB SERPL QL IA: NONREACTIVE

## 2021-08-20 ENCOUNTER — TELEPHONE (OUTPATIENT)
Dept: FAMILY MEDICINE | Facility: CLINIC | Age: 41
End: 2021-08-20

## 2021-08-20 LAB
ALBUMIN SERPL-MCNC: 3.4 G/DL (ref 3.5–5)
ALP SERPL-CCNC: 97 U/L (ref 45–120)
ALT SERPL W P-5'-P-CCNC: 10 U/L (ref 0–45)
ANION GAP SERPL CALCULATED.3IONS-SCNC: 14 MMOL/L (ref 5–18)
AST SERPL W P-5'-P-CCNC: 15 U/L (ref 0–40)
BILIRUB SERPL-MCNC: 0.4 MG/DL (ref 0–1)
BUN SERPL-MCNC: 12 MG/DL (ref 8–22)
CALCIUM SERPL-MCNC: 8.8 MG/DL (ref 8.5–10.5)
CHLORIDE BLD-SCNC: 106 MMOL/L (ref 98–107)
CO2 SERPL-SCNC: 18 MMOL/L (ref 22–31)
CREAT SERPL-MCNC: 0.77 MG/DL (ref 0.6–1.1)
GFR SERPL CREATININE-BSD FRML MDRD: >90 ML/MIN/1.73M2
GLUCOSE BLD-MCNC: 88 MG/DL (ref 70–125)
HUMAN PAPILLOMA VIRUS 16 DNA: NEGATIVE
HUMAN PAPILLOMA VIRUS 18 DNA: NEGATIVE
HUMAN PAPILLOMA VIRUS FINAL DIAGNOSIS: ABNORMAL
HUMAN PAPILLOMA VIRUS OTHER HR: POSITIVE
POTASSIUM BLD-SCNC: 4.2 MMOL/L (ref 3.5–5)
PROT SERPL-MCNC: 6.2 G/DL (ref 6–8)
SODIUM SERPL-SCNC: 138 MMOL/L (ref 136–145)

## 2021-08-20 NOTE — TELEPHONE ENCOUNTER
Called pt to discuss her labs.  When just continue to follow the hyperparathyroidism, especially his calcium appeared normal.  Next we could consider a urine calcium or an ionized calcium as well.  Would have patient take a vitamin D and a iron supplement.

## 2021-08-25 ENCOUNTER — PATIENT OUTREACH (OUTPATIENT)
Dept: FAMILY MEDICINE | Facility: CLINIC | Age: 41
End: 2021-08-25

## 2021-08-25 LAB
BKR LAB AP GYN ADEQUACY: NORMAL
BKR LAB AP GYN INTERPRETATION: NORMAL
BKR LAB AP HPV REFLEX: NORMAL
BKR LAB AP PREVIOUS ABNORMAL: NORMAL
PATH REPORT.COMMENTS IMP SPEC: NORMAL
PATH REPORT.RELEVANT HX SPEC: NORMAL

## 2021-09-26 ENCOUNTER — HEALTH MAINTENANCE LETTER (OUTPATIENT)
Age: 41
End: 2021-09-26

## 2021-09-29 ENCOUNTER — OFFICE VISIT (OUTPATIENT)
Dept: FAMILY MEDICINE | Facility: CLINIC | Age: 41
End: 2021-09-29
Payer: COMMERCIAL

## 2021-09-29 VITALS
SYSTOLIC BLOOD PRESSURE: 111 MMHG | HEART RATE: 60 BPM | WEIGHT: 274 LBS | BODY MASS INDEX: 52.63 KG/M2 | DIASTOLIC BLOOD PRESSURE: 66 MMHG

## 2021-09-29 DIAGNOSIS — B97.7 HIGH RISK HPV INFECTION: Primary | ICD-10-CM

## 2021-09-29 DIAGNOSIS — Z98.890 HISTORY OF COLPOSCOPY: ICD-10-CM

## 2021-09-29 LAB — HCG UR QL: NEGATIVE

## 2021-09-29 PROCEDURE — 88305 TISSUE EXAM BY PATHOLOGIST: CPT | Performed by: PATHOLOGY

## 2021-09-29 PROCEDURE — 81025 URINE PREGNANCY TEST: CPT | Performed by: FAMILY MEDICINE

## 2021-09-29 PROCEDURE — 57454 BX/CURETT OF CERVIX W/SCOPE: CPT | Performed by: FAMILY MEDICINE

## 2021-09-29 NOTE — PROGRESS NOTES
"    Assessment & Plan     High risk HPV infection    - HCG qualitative urine  - HCG qualitative urine    History of colposcopy    - HCG qualitative urine  - HCG qualitative urine      Discussed indication for colposcopy with patient.  She does have history of LEEP procedure in the past although those records are not available.  Recent Pap was normal but she did test positive for high-risk type HPV infection.  Discussed procedure for colposcopy with patient including risks of discomfort, bleeding, reaction to topical solutions and infection.  Consent form was signed.  She was placed in dorsal lithotomy position.  Cervix was visualized with a speculum.  Cervix and adjacent vagina were normal.  Entire squamocolumnar junction was seen.  IUD strings present.  3% acetic acid solution was applied to the surface of the cervix.  No acetowhite changes noted.  Lugol's solution was applied with no areas of decreased uptake.  Endocervical curettage was performed.  Did not identify any areas that required biopsy.  Specimen was sent to pathology.  Discussed with patient that overall my impression is that this is a normal colposcopy.  Would recommend follow-up Pap smear and HPV screen in 1 year.  We will notify her of pathology results and any other recommendations when those results are available.             BMI:   Estimated body mass index is 52.63 kg/m  as calculated from the following:    Height as of 8/17/21: 1.537 m (5' 0.5\").    Weight as of this encounter: 124.3 kg (274 lb).           No follow-ups on file.    Krystle Lozada MD  [facility] MARISSA Holbrook is a 41 year old who presents for the following health issues     HPI   She presents today for a colposcopy.  She recently had a Pap smear with her PCP.  Pap smear was normal but HPV test was positive for other high risk type HPV not 16 or 18.  Previous Pap and HPV test in 2016 were negative.  She did have an abnormal Pap about 10 years ago " and subsequently had a colposcopy and a LEEP procedure.  She has not had any new sexual partners since her last Pap in 2016.  She uses an IUD for contraception.  She has no other concerns or questions today.        Review of Systems         Objective    /66   Pulse 60   Wt 124.3 kg (274 lb)   BMI 52.63 kg/m    Body mass index is 52.63 kg/m .  Physical Exam   GENERAL: healthy, alert and no distress   (female): normal female external genitalia, normal urethral meatus, vaginal mucosa, normal cervix/adnexa/uterus without masses or discharge, IUD strings present    Results for orders placed or performed in visit on 09/29/21 (from the past 24 hour(s))   HCG qualitative urine   Result Value Ref Range    hCG Urine Qualitative Negative Negative

## 2021-10-01 LAB
PATH REPORT.COMMENTS IMP SPEC: NORMAL
PATH REPORT.FINAL DX SPEC: NORMAL
PATH REPORT.GROSS SPEC: NORMAL
PATH REPORT.MICROSCOPIC SPEC OTHER STN: NORMAL
PATH REPORT.RELEVANT HX SPEC: NORMAL
PHOTO IMAGE: NORMAL

## 2021-10-14 ENCOUNTER — HOSPITAL ENCOUNTER (OUTPATIENT)
Dept: MAMMOGRAPHY | Facility: CLINIC | Age: 41
Discharge: HOME | End: 2021-10-14
Attending: FAMILY MEDICINE | Admitting: FAMILY MEDICINE
Payer: COMMERCIAL

## 2021-10-14 DIAGNOSIS — Z12.31 ENCOUNTER FOR SCREENING MAMMOGRAM FOR BREAST CANCER: ICD-10-CM

## 2021-10-14 PROCEDURE — 77067 SCR MAMMO BI INCL CAD: CPT

## 2021-10-18 ENCOUNTER — PATIENT OUTREACH (OUTPATIENT)
Dept: FAMILY MEDICINE | Facility: CLINIC | Age: 41
End: 2021-10-18

## 2021-10-18 NOTE — TELEPHONE ENCOUNTER
9/29/21 [place]: atypical squamous metaplasia, neg for high grade dysplasia. Plan: cotest in 1 year

## 2021-10-20 ENCOUNTER — HOSPITAL ENCOUNTER (OUTPATIENT)
Dept: MAMMOGRAPHY | Facility: CLINIC | Age: 41
Discharge: HOME | End: 2021-10-20
Attending: FAMILY MEDICINE | Admitting: FAMILY MEDICINE
Payer: COMMERCIAL

## 2021-10-20 DIAGNOSIS — R92.0 BREAST MICROCALCIFICATIONS: ICD-10-CM

## 2021-10-20 PROCEDURE — 77065 DX MAMMO INCL CAD UNI: CPT | Mod: LT

## 2021-12-22 ENCOUNTER — MYC MEDICAL ADVICE (OUTPATIENT)
Dept: FAMILY MEDICINE | Facility: CLINIC | Age: 41
End: 2021-12-22
Payer: COMMERCIAL

## 2021-12-27 DIAGNOSIS — Z71.84 TRAVEL ADVICE ENCOUNTER: Primary | ICD-10-CM

## 2022-01-18 ENCOUNTER — E-VISIT (OUTPATIENT)
Dept: FAMILY MEDICINE | Facility: CLINIC | Age: 42
End: 2022-01-18
Payer: COMMERCIAL

## 2022-01-18 DIAGNOSIS — R21 RASH: Primary | ICD-10-CM

## 2022-01-18 PROCEDURE — 99421 OL DIG E/M SVC 5-10 MIN: CPT | Performed by: FAMILY MEDICINE

## 2022-03-19 ENCOUNTER — VIRTUAL VISIT (OUTPATIENT)
Dept: URGENT CARE | Facility: CLINIC | Age: 42
End: 2022-03-19
Payer: COMMERCIAL

## 2022-03-19 ENCOUNTER — NURSE TRIAGE (OUTPATIENT)
Dept: NURSING | Facility: CLINIC | Age: 42
End: 2022-03-19

## 2022-03-19 DIAGNOSIS — R06.02 SOB (SHORTNESS OF BREATH): ICD-10-CM

## 2022-03-19 DIAGNOSIS — U07.1 INFECTION DUE TO 2019 NOVEL CORONAVIRUS: Primary | ICD-10-CM

## 2022-03-19 DIAGNOSIS — E66.01 MORBID OBESITY WITH BMI OF 50.0-59.9, ADULT (H): ICD-10-CM

## 2022-03-19 PROCEDURE — 99214 OFFICE O/P EST MOD 30 MIN: CPT | Mod: 95 | Performed by: PHYSICIAN ASSISTANT

## 2022-03-19 RX ORDER — ALBUTEROL SULFATE 90 UG/1
1-2 AEROSOL, METERED RESPIRATORY (INHALATION) EVERY 4 HOURS PRN
Qty: 18 G | Refills: 0 | Status: SHIPPED | OUTPATIENT
Start: 2022-03-19 | End: 2022-04-05

## 2022-03-19 NOTE — PROGRESS NOTES
Assessment:     COVID-19 positive patient.  Encounter for consideration of medication intervention.    Patient does qualify for a prescription.   Full discussion with patient including medication options, risks and benefits.   Potential drug interactions reviewed with patient.      Plan:  Treatment planned - Patient wants to do Monoclonal Antibodies instead of Paxlovid   Due to SOB and slight wheezing I will prescribe a budesonide inhaler as well.     See patient instructions    Qing Rodrigez PA-C  Virtual Urgent Care      Patient preference to obtain AVS:  Gloria Lynn  is a 41 year old  who has a confirmed new positive COVID-19 diagnosis.    She has been identified as high risk for complications of this infection, and is being evaluated via a billable Video visit:     Video-Visit Details    Type of service:  Video Visit    Video Visit Start Time:6:19 PM    Video End Time:6:39 PM    Originating Location (pt. Location): Home    Distant Location (provider location):  [facility]     [facility]    Concern for COVID-19  Exactly how many days ago did these symptoms start? 1 days    Patient developed a runny nose and cold symptoms yesterday. Today has developed a sore throat and cough.      Are any of the following symptoms significant for you?    New or worsening difficulty breathing? No    Worsening cough? Yes, it's a dry cough.     Fever or chills? No    Headache: YES    Sore throat: YES    Chest pain: YES- feels heavy    Diarrhea: no    Body aches? YES    What treatments has patient tried? Decongestant - oral   Does patient live in a nursing home, group home, or shelter? no  Does patient have a way to get food/medications during quarantined? Yes, I have a friend or family member who can help me.      MASSBP Score 3/19/2022   Age Greater than or equal to 65 years 0   BMI greater than or equal to 35 kg/m2 2   Has Diabetes Mellitus 0   Has  "Chronic Kidney Disease 0   Has Cardiovascular Disease and 55 years or older 0   Has Chronic Respiratory Disease and 55 years or older 0   Has Hypertension and 55 years or older 0   Is Immunocompromised 0   Is Pregnant 0   Member of BIPOC community (Black/, /, ,  or , or  or [place] Native)  2   MASSBP Score 4   Has the patient had a positive COVID test outside our system?  Yes   What day did symptoms start?  3/18/2022         Constitutional, HEENT, cardiovascular, pulmonary, gi and gu systems are negative, except as otherwise noted.    Objective    Vitals:  No vitals were obtained today due to virtual visit.  Estimated body mass index is 52.63 kg/m  as calculated from the following:    Height as of 8/17/21: 1.537 m (5' 0.5\").    Weight as of 9/29/21: 124.3 kg (274 lb).   GENERAL: alert, no distress and obese  EYES: Eyes grossly normal to inspection.  No discharge or erythema, or obvious scleral/conjunctival abnormalities.  RESP: lungs clear to auscultation - no rales, rhonchi or wheezes  SKIN: Visible skin clear. No significant rash, abnormal pigmentation or lesions.  NEURO: Cranial nerves grossly intact.  Mentation and speech appropriate for age.  PSYCH: Mentation appears normal, affect normal/bright, judgement and insight intact, normal speech and appearance well-groomed.    GFR Estimate   Date Value Ref Range Status   08/17/2021 >90 >60 mL/min/1.73m2 Final     Comment:     As of July 11, 2021, eGFR is calculated by the CKD-EPI creatinine equation, without race adjustment. eGFR can be influenced by muscle mass, exercise, and diet. The reported eGFR is an estimation only and is only applicable if the renal function is stable.   12/13/2018 >60 >60 mL/min/1.73m2 Final                "

## 2022-03-19 NOTE — PATIENT INSTRUCTIONS
You can go to the [facility] MNRAP site or follow the link below to complete for patients or caregivers request form for Monoclonal antibodies for higher risk family members as well.     [email or web address]

## 2022-03-20 NOTE — TELEPHONE ENCOUNTER
Triage Call: Patient did a virtual visit about 1 hour ago and it is not covered by insurance. Medication was Budesonide ( Pulmicort Flexhaler) 90 Mcg/ACT inhaler. Patient tested positive for covid today.     Paging on call provider Dr Pratik Bledsoe. Per MD - Albuterol (proair HFA/proventil HFA/Ventolin HFA) 108 (90 Base) MCG/ACT inhaler. 1-2 puffs every 4 hours as needed for SOB/wheezing.     Patient was called back and stated Dr Bledsoe prescribed a new inhaler. She stated understanding.     Denise العلي, RN Nursing Advisor 3/19/2022 8:17 PM     Reason for Disposition    [1] Caller has URGENT medication question about med that PCP or specialist prescribed AND [2] triager unable to answer question    Additional Information    Negative: Drug overdose and triager unable to answer question    Negative: Caller requesting information unrelated to medicine    Negative: Caller requesting a prescription for Strep throat and has a positive culture result    Negative: Rash while taking a medication or within 3 days of stopping it    Negative: Immunization reaction suspected    Negative: [1] Asthma and [2] having symptoms of asthma (cough, wheezing, etc.)    Negative: [1] Influenza symptoms AND [2] anti-viral med prescription request, such as Tamiflu    Negative: [1] Symptom of illness (e.g., headache, abdominal pain, earache, vomiting) AND [2] more than mild    Negative: MORE THAN A DOUBLE DOSE of a prescription or over-the-counter (OTC) drug    Negative: [1] DOUBLE DOSE (an extra dose or lesser amount) of over-the-counter (OTC) drug AND [2] any symptoms (e.g., dizziness, nausea, pain, sleepiness)    Negative: [1] DOUBLE DOSE (an extra dose or lesser amount) of prescription drug AND [2] any symptoms (e.g., dizziness, nausea, pain, sleepiness)    Negative: Took another person's prescription drug    Negative: [1] Pharmacy calling with prescription questions AND [2] triager unable to answer question    Negative: [1] Prescription  "not at pharmacy AND [2] was prescribed by PCP recently    Negative: [1] Request for URGENT new prescription or refill of \"essential\" medication (i.e., likelihood of harm to patient if not taken) AND [2] triager unable to fill per unit policy    Negative: Diabetes drug error or overdose (e.g., took wrong type of insulin or took extra dose)    Negative: [1] DOUBLE DOSE (an extra dose or lesser amount) of prescription drug AND [2] NO symptoms (Exception: a double dose of antibiotics)    Protocols used: MEDICATION QUESTION CALL-A-AH      "

## 2022-03-23 ENCOUNTER — VIRTUAL VISIT (OUTPATIENT)
Dept: SURGERY | Facility: CLINIC | Age: 42
End: 2022-03-23
Payer: COMMERCIAL

## 2022-03-23 VITALS — WEIGHT: 270 LBS | HEIGHT: 61 IN | BODY MASS INDEX: 50.98 KG/M2

## 2022-03-23 VITALS — HEIGHT: 61 IN | WEIGHT: 270 LBS | BODY MASS INDEX: 50.98 KG/M2

## 2022-03-23 DIAGNOSIS — Z98.84 BARIATRIC SURGERY STATUS: Primary | ICD-10-CM

## 2022-03-23 DIAGNOSIS — K91.2 POSTOPERATIVE MALABSORPTION: Primary | ICD-10-CM

## 2022-03-23 DIAGNOSIS — E66.01 MORBID OBESITY WITH BMI OF 50.0-59.9, ADULT (H): ICD-10-CM

## 2022-03-23 DIAGNOSIS — Z98.84 S/P BARIATRIC SURGERY: ICD-10-CM

## 2022-03-23 DIAGNOSIS — D50.9 IRON DEFICIENCY ANEMIA, UNSPECIFIED IRON DEFICIENCY ANEMIA TYPE: ICD-10-CM

## 2022-03-23 PROCEDURE — 99215 OFFICE O/P EST HI 40 MIN: CPT | Mod: GT | Performed by: EMERGENCY MEDICINE

## 2022-03-23 PROCEDURE — 97802 MEDICAL NUTRITION INDIV IN: CPT | Mod: GT | Performed by: DIETITIAN, REGISTERED

## 2022-03-23 RX ORDER — PHENTERMINE HYDROCHLORIDE 37.5 MG/1
TABLET ORAL
Qty: 30 TABLET | Refills: 0 | Status: SHIPPED | OUTPATIENT
Start: 2022-03-23 | End: 2022-05-11

## 2022-03-23 NOTE — PATIENT INSTRUCTIONS
"Plan:  1. Welcome back. Aim for 15-20g of lean protein at 3 meals daily, about every 4-5 hours and supplement w/ protein rich snacks if not hitting 60g/day. Follow up as planned with dietician.  2. Separate beverages from meals by 20-30 minutes to improve fullness/intake and reduce the nibbling tendency that results from not getting a satiating enough meal.  3. Trial of phentermine once your COVID has recovered in 7-10 days. If not requiring any more therapy/meds/decongestants then start half a tablet AFTER breakfast, about 11-12hours before your desired bedtime at the latest to reduce the risk of insomnia. Stop if mood swings, anxiety, racing heart beats, visual changes/headaches or intolerance (see below). Don't mix with other stimulants.  4. Start the Equate Complete multivitamin TWICE daily (no gummy multivitamins ever, they do not have what you need). B12 should be 1000mcg of \"sublingual\" B12 3 days weekly. D3 should be 5000IUs/day and calcium citrate should be 500mg daily. We'll check vitamin labs and results will return about 10 days after labs are drawn at which time I'll message you.  5. 10minutes of spinning/yoga/kickboxing daily. Strength work is fine as well 2-3 days weekly for 10 minutes or more.     [facility]  Nutritional Guidelines  Gastric Bypass 18 Months Post Op and Beyond    General Guidelines and Helpful Hints:    Eat 3 meals per day + protein supplement(s). No snacks between meals.  o Do not skip meals.  This can cause overeating at the next meal and will prevent adequate protein and nutritional intake.    Aim for 60-80 grams of protein per day.  o Always eat your protein first. This assists with optimal nutrition and helps you stay full longer.  o Depending on your portion size, you may need to drink approved protein supplement between meals to achieve protein goals. Follow recommendations of your Dietitian.     Eat your protein first, and then follow with fiber.   o It is not " "necessary to count your fiber, but 15-20 grams per day is recommended.    o Add fiber by including fruits, vegetables, whole grains, and beans.     Portions should remain about 1 cup per meal. Use measuring cups to be accurate.    Continue to use saucer/salad plates, infant/toddler silverware to keep portion sizes small and take small bites.    Eat S-[id number] to make each meal last 20-30 minutes. Always stop eating when satisfied.    Continue to use caution with foods containing skins, peels or membranes. Chew well!    Aim for 64 oz. of calorie-free fluids daily.  o Continue to avoid caffeine and carbonation. If you choose to drink alcohol, do so in moderation.   o Remember to avoid drinking during meals, 15-30 minutes before and 30 minutes after.    Exercise is benavidez for continued weight loss and weight maintenance. 150 minutes weekly of moderate aerobic activity or 75 minutes of vigorous with 2 days or more a week of strength training. Try to get 20% or more of your steps each day at a brisk pace, as though hurrying to a bus stop. Look to get stronger this year.    If having trouble tolerating meat, try using a crock-pot, tinfoil tent, steamer or other moist cooking method to create tender meats. Add broth or low-fat gravy to help meat stay moist.     Avoid high sugar and high fat foods to prevent dumping syndrome.  o Check nutrition labels for less than 10 grams of sugar and less than 10 grams of fat per serving.    Continue Taking Vitamins/Minerals:  o 1000 mcg of Sublingual B-12 at least 3 days weekly to average 350-500mcg/day. If using 2500mcg lozenges, 2 weekly.  If 5000 mcg, once weekly dosing works.  o 1 Complete Multivitamin with 18mg Iron twice daily (chewable or swallow tabs). Often sold as \"women's one a day\" if tablet but take twice daily.  o 500-600 mg Calcium Citrate twice daily (chewable or swallow tabs).  o 5000 IU Vitamin D3 daily.  o If menstruating, you may need closer to 60mg of iron daily to " "prevent iron deficiency. An occasional \"boost\" of extra iron supplement during/after is reasonable if heavy flow.    Sample Grocery List    Protein:    Fat free Greek or light yogurt (less than 10 grams sugar)    Fat free or low-fat cottage cheese    String cheese or reduced fat cheese slices    Tuna, salmon, crab, egg, or chicken salad made with light or fat free mayonnaise    Egg or Egg Substitute    Lean/extra lean turkey, beef, bison, venison (ground, sirloin, round, flank)    Pork loin or tenderloin (grilled, baked, broiled)    Fish such as salmon, tuna, trout, tilapia, etc. (grilled, baked, broiled)    Tender cuts of lean (skinless) turkey or chicken    Lean deli meats: turkey, lean ham, chicken, lean roast beef    Beans such as kidney, garbanzo, black, mendez, or low-fat/fat free refried beans    Peanut butter (natural preferred). Limit to 1 Tbsp. per day.    Low-fat meatloaf (made with lean ground beef or turkey)    Sloppy Joes made with low-sugar ketchup and lean ground beef or turkey    Soy or vegetable protein (i.e. vegan crumbles, soy/veggie burger, tofu)    Hummus    Vegetables:    Fresh: cooked or raw (as tolerated)    Frozen vegetables    Canned vegetables (low sodium or no salt added, rinse before cooking/eating)    (Ok to have skins/peels/membranes/seeds - just chew well)    Fruits:    Fresh fruit    Frozen fruit (no sugar added)    Canned fruit (packed in its own juice, NOT syrup)    (Ok to have skins/peels/membranes/seeds - just chew well)    Starch:    Unsweetened whole-grain hot cereal (or high fiber cold cereal, dry)    Toasted whole wheat bread or [place] Thins    Whole grain crackers    Baked /boiled/mashed potato/sweet potato    Cooked whole grain pasta, brown rice, or other cooked whole grains    Starchy vegetables: corn, peas, winter squash    Protein Supplement:     Ready to drink protein shake with:  o 15-30 grams protein per serving  o Less than 10 grams total carbohydrate per serving "     Protein powder mixed with:  o  Skim or 1% milk  o Low fat or fat free Lactaid milk, plain or no sugar added soymilk  o Water     Fats: (use in moderation)    1 teaspoon of soft tub margarine    1 teaspoon olive oil, canola oil, or peanut oil    1 tablespoon of low-fat mccormick or salad dressing     Sample Menu for 18+ months after Gastric Bypass    You do NOT need to eat/drink the full portion sizes listed below  Always stop when you are satisfied    Breakfast   cup 1% cottage cheese     cup mixed berries   Lunch 2 oz lean roast beef on   [place] Thin with 1 tsp. light mccormick    small tomato, chopped, mixed with 1 tsp. light vinaigrette dressing   Supplement Approved protein supplement (if needed between meals)   Dinner 2 oz grilled salmon    cup salad greens with 1 tsp. light salad dressing and 1 tsp. ground flax seed    cup quinoa or brown rice     Breakfast   cup egg substitute with   cup sautéed chopped vegetables  2 light [place] Krisp crackers   Lunch Tuna Melt:   cup tuna mixed with 1 tsp. light mccormick over   [place] Thin. Top with 2-3 slices cucumber and 1 oz slice of low fat cheese   Supplement 1 cup skim milk (if needed between meals)   Dinner 3 oz  grilled, broiled, or baked seasoned skinless chicken breast    cup asparagus     Breakfast   cup plain oatmeal made with skim or 1% milk with 1 Tbsp. flavored/unflavored protein powder added  1 mozzarella string cheese   Lunch 2 oz deli turkey breast  1/3 cup salad with 1 tsp. light salad dressing, 1/8 of a whole avocado and 1 Tbsp. sunflower seeds   Dinner 3 oz. pork loin made in a crock pot, seasoned with a spice rub    cup cooked carrots   Supplement Approved protein supplement (if needed between meals)     Breakfast 1 cup breakfast casserole made with egg substitute, turkey sausage,  and steamed, chopped bell peppers   Supplement  1 cup light Greek yogurt (if needed between meals)   Lunch 2 oz. teriyaki turkey    cup mashed sweet potato with 1-2 spritzes of spray  butter    cup fresh pineapple   Dinner 3 oz low fat meatloaf    cup roasted garlic zucchini     Breakfast   cup leftover breakfast casserole    cup no sugar added applesauce with 1 Tbsp. unflavored protein powder and a sprinkle of cinnamon    Lunch 3 oz shrimp with 1-2 Tbsp. low-sugar cocktail sauce for dipping    c. whole wheat pasta drizzled with   tsp. olive oil   Supplement 1 cup skim/1% milk with scoop of protein powder (if needed between meals)   Dinner Grilled, seasoned kebob with 2 oz lean beef and   cup vegetables     Breakfast Breakfast pizza:   [place] Thin spread with 1 Tbsp. low sugar spaghetti sauce,   cup shredded low fat cheese, melted and 1 slice of [place] varma     cup fresh fruit mixed with chopped almonds   Lunch   cup black bean soup  4-5 whole grain crackers   Dinner 3 oz  tilapia with lemon pepper seasoning    cup stewed tomatoes   Supplement 1 string cheese (if needed between meals)     Breakfast 2 hard boiled eggs (discard 1 egg yolk)    whole wheat English Muffin with 1 tsp. low sugar jelly   Lunch   cup leftover black bean soup topped with 1-2 Tbsp. low fat cheese  2-3 light [place] Krisp crackers   Supplement Approved protein supplement (if needed between meals)   Dinner 3 oz sirloin steak    cup steamed broccoli       LEAN PROTEIN SOURCES  Getting 20-30 grams of protein, 3 meals daily, is appropriate for most people, some need more but more than about 40 grams per meal is not useful.  General rule is drinking one ounce of water per gram of protein eaten over the course of the day:  70 grams of protein each day, drink 70 oz of water.  Protein Source Portion Calories Grams of Protein                           Nonfat, plain Greek yogurt    (10 grams sugar or less) 3/4 cup (6 oz)  12-17   Light Yogurt (10 grams sugar or less) 3/4 cup (6 oz)  6-8   Protein Shake 1 shake 110-180 15-30   Skim/1% Milk or lactose-free milk 1 cup ( 8 oz)  8   Plain or light, flavored soymilk 1 cup   7-8   Plain or light, hemp milk 1 cup 110 6   Fat Free or 1% Cottage Cheese 1/2 cup 90 15   Part skim ricotta cheese 1/2 cup 100 14   Part skim or reduced fat cheese slices 1 ounce 65-80 8     Mozzarella String Cheese 1 80 8   Canned tuna, chicken, crab or salmon  (canned in water)  1/2 cup 100 15-20   White fish (broiled, grilled, baked) 3 ounces 100 21   [place]/Tuna (broiled, grilled, baked) 3 ounces 150-180 21   Shrimp, Scallops, Lobster, Crab 3 ounces 100 21   Pork loin, Pork Tenderloin 3 ounces 150 21   Boneless, skinless chicken /turkey breast                          (broiled, grilled, baked) 3 ounces 120 21   [place], [place], [place], and Venison 3 ounces 120 21   Lean cuts of red meat and pork (sirloin,   round, tenderloin, flank, ground 93%-96%) 3 ounces 170 21   Lean or Extra Lean Ground Turkey 1/2 cup 150 20   90-95% Lean [place] Burger 1 david 140-180 21   Low-fat casserole with lean meat 3/4 cup 200 17   Luncheon Meats                                                        (turkey, lean ham, roast beef, chicken) 3 ounces 100 21   Egg (boiled, poached, scrambled) 1 Egg 60 7   Egg Substitute 1/2 cup 70 10   Nuts (limit to 1 serving per day)  3 Tbsp. 150 7   Nut [place] (peanut, almond)  Limit to 1 serving or less daily 1 Tbsp. 90 4   Soy Burger (varies) 1  15   Garbanzo, Black, [place] Beans 1/2 cup 110 7   Refried Beans 1/2 cup 100 7   Kidney and Lima beans 1/2 cup 110 7   Tempeh 3 oz 175 18   Vegan crumbles 1/2 cup 100 14   Tofu 1/2 cup 110 14   Chili (beans and extra lean beef or turkey) 1 cup 200 23   Lentil Stew/Soup 1 cup 150 12   Black Bean Soup 1 cup 175 12           Exercise Guidance    Nearly everything that bothers us gets better when the proper amount of exercise can be done in the proper amounts.  Getting to that level safely and without injury is the key.  When it comes to weight loss, exercise is especially important in maintaining the weight loss.  Unfortunately, one of the harsh  realities is that substantial weight loss slows our metabolism, often anywhere from 5-20%.    Our brain always remembers our heaviest weight and we can return to that if we're not mindful and moving regularly.  Our biology doesn't understand the concept of having too much energy, only not having enough.  As such, when we lose weight, it's thought that the brain interprets this as we're ill or in a famine and dials back our metabolism to limit further weight loss.  This is why exercise is so important in keeping the weight off and is the main reason people have some weight regain from their low weight point after weight loss.  We have to make up that 10-20% of calories not being burned.Since we can restrict our intake for only so long, exercise becomes very important in our long term healthy weigh maintenance to balance out the occasional indiscretion with our diet.    Generally, for every 5% body weight reduction in a weight loss season, a person needs to add  kilocalories of exercise in their daily routine to keep that weight off for the long term.  This is why it's vital to be starting your fitness regimen during weight loss season, so that routine is well established as you move into your maintenance period.    Additionally, all sorts of good enzymes and genes turn on with exercise and our stress, sleep, mood and bodies feel better when we can get to the point of making ourselves a little sweaty and short of breath 35-50 minutes most days of the week. But we have to start with what we can do first and give ourselves permission to work our way up to this goal.    Who isn't ready for exercise? Well, if you get severe dizziness/palpitations, chest pain or short of breath/faint with even minimal activity like walking across a room or you're having to pause while going up a flight of stairs, then getting your heart and/or lungs fully evaluated prior to starting an exercise regimen is recommended. Everyone else  "can probably start a program, but everyone may start at a different point:  Some can set a 5-10 minute walking goal and others will be able to ride their bike for an hour.      Start with where you're at and look to add 10% more each week until you're at that 150 minutes or more a week (or 75 minutes/week or more of vigorous exercise). Moderate exercise can be estimated as the pace you can carry on a conversation and vigorous is the pace at which you can get 3-5 words out before having to take a breath.  If you're using heart rate monitoring, Moderate is about 60% of your maximum heart rate and vigorous about 75%. (Max heart rate estimated as 220 beats minus your age:  Example: 220-age of 45 =176 Beats per minute (BPM) maximum. 0.6X 176= 105 BPM (moderate), 132 BMP(vigorous)).    If you like to count steps, the 10,000 steps per day does correlate well with weight maintenance but try to make at least 20-25% of those steps at a brisk pace (like you are about to miss your bus).    Finally, if you are pressed for time, it's important to know that some exercise is better than none.  High Intensity Interval training (HIIT) is a good way to get as much out of a short period of working out. If you can't walk, use the stairs, bike or swim; you could use a punching/arm workout regimen for your activity.  The idea with HIIT is to have a 3-6 minute warm up period of low intensity and the 3-6 \"intervals\" where you push the intensity up and then recover and start the next interval. One study showed that 3 intervals of 20 seconds at \"Maximum Effort\" while either biking on a stationary bike or going up stairs and then having 100 seconds recovery time before the next Maximum Effort was equally as beneficial on cardiovascular fitness development as doing 30 minutes of moderately paced walking 3 days weekly over a 6 week period of time.  So intensity matters. You just need to be able to safely do your desired exercise without injury. " There are many great HIIT exercises/routines out there. IF you're not doing much exercise currently, I recommend giving your self 2-3 weeks of moderate exercise, 3 days weekly minimum to get your bones/tendons/muscles used to exercise before going for High Intensity workouts.    If you like to use Apps on the phone, the couch to 5k brody and 7 minute workout apps are nice places to start if you are reasonably healthy.  There are hundreds of other options out there.  Consider viewing [employer] if gentler exercise/movement is desired. Videos on [place] Chi and chair yoga for seniors exist and are free. Check them out and let's get that 3-4 days a week routine going.    Let's move!  Manoj Stover MD.     Information about the Weight Loss Medication Phentermine    When combined with mindful eating and behavior changes, weight loss medications can be a nice additional tool to maximize your weight loss season.  There are no magic pills and without diet and behavior changes, weight loss will be minimal.  Think of this medication as a tool to make your diet and behavior changes easier and you'll enjoy a higher probability of success.  Remember not to skip meals, but use this medication to tolerate your reduced calories more easily.  If you are very hungry in the evenings, you are likely not eating enough in the first 10 hours of your day and need to focus on getting your protein requirements in at each of your 3 daily meals.      Phentermine is a stimulant medication related to the amphetamine class of medication but with a lower risk of dependence and addiction.  It is used for weight loss by suppressing the appetite region of the brain.  It also may speed up the metabolic rate and give a person more energy.  Like any medication there are potential side effects and the most common are:  Dry mouth occurs in almost everyone (hydrate well), fewer people experience Palpatations, fast heart rate, elevation of blood pressure,  "restlessness, insomnia, dizziness, change in mood, tremor, headache, changes in bowel movements,itchiness, changes in sex drive.  If you are or may have become pregnant, do not use phentermine as it increases the risk for birth defects/miscarriage.  Do not use if breastfeeding.    Some people can develop serious side effects which include:  Heart strain (\"ischemia\").  Tachycardia (fast heart rate or irregular heart rate).  Hypertension  Pulmonary Hypertension  Psychosis  Dependency and abuse has occurred in some.  If you've been on high dose (37.5mg) for long periods, phentermine should be tapered down over a few weeks before abruptly stopping as seizures have been reported rarely.    We do not recommend taking it in combination with the following medications due to potential drug interactions which can increase the risk of side effects and/or potential for seizures:    Absolutely contraindicated are:  Amphetamines or other stimulants like ADHD medication: (dextroamphetamine, amphetamine, diethylpropion, isocarboxazid, methamphetamine, lisdexamfetamine, benzphetamine,dexmethylphenidate, methylphenidate, selegiline patch, sibutramine, tranylcypromine.    Avoid use with:   Dopamine, dobutamine, ephedra, ephedrine, epinephrine, isoproterenol, linezolid, norepinephrine, phenylephrine injection, venlafaxine (Effexor).    Monitor or modify dose with:  Acebutolol, atenolol, betaxolol, bisoprolol, carvedilol, droxidopa, esmolol, labetalol, magnesium citrate, metoprolol, nadolol, nebivolol, penbutolol, pindolol, propranolol, sotalol, timolol.    Caution with: armodafinil,betaxolol eye drops, brexpiprazole, bupropion, busulfan, caffeine, carteolol drops, enzalutaminde, ginseng, green tea, guarana, levobunolol drops, lindane cream, modafinil, afrin nasal spray (oxymetazoline), pamabrom, phenylephrine oral and nasal spray, pseudoephedrine (sudafed), rasgiline, sleegiline, bowel prep, tiagabine, timolol drops,  TRAMADOL due to " "increased risk of seizures.    The current cheapest place to fill your prescription is at [facility], [facility], [facility] or [place] pharmacy,[facility] or [facility] and is around $22for 90 tablets.  Occasionally, [employer] and [employer] have price matched, so call around and get the best price for you.  Other pharmacies may charge closer to$70- $100 for the same prescription. You don't have to be a member to use the pharmacy at [facility] currently.  An alternative some patients have tried is using a voucher system through [employer].  $25 paid on their website gets you a  voucher that can allows you to pick your meds up at [facility] without paying anything more.  [employer] may also offer discounted coupons and give prices around you.    Dosing:  We start with half a tablet for the first 2-3 weeks and if tolerating it without problems, you can take up to one full tablet daily in the morning after breakfast.  For those with evening hunger problems, sometimes half a tablet in the morning and half a tablet around 1 pm can be effective, however, risks of nighttime insomnia/restless increase with afternoon dosing so call me at the clinic if considering this regimen or having any issues.  You only have to use the amount effective for you, not to exceed one full tablet.  It can also be used situationaly and does not have to be taken every day. For more sensitive individuals,: get a pill cutter and cut the half tab into quarters and use a quarter of a tablet, about 9mg, for a couple weeks before increasing to half a tablet (18.75mg) if needed.  As always, if any questions give us a call at the [facility] telephone:  [phone].     Don't use Phentermine if sick/ill or using other stimulants/cold medications and it's OK to skip days that you don't feel the need for appetite suppressant assistance.  This medication works the day you take it and doesn't require \"building up\" in the system.  MEDICATIONS FOR WEIGHT " LOSS  There are several medications available to assist us in weight loss.  By themselves, without compliance to a change in diet and increase in movement/activity these medications are disappointing in their results. However, combined with a closely monitored program of diet change and exercise they can be very effective in controlling appetite and boosting initial weight loss.  All weight loss medications need continual re-evaluation for efficacy as their side effects and health benefits fail to be worthwhile if a person is not continuing to lose weight or in maintaining their healthy weight.  Some weight loss medications are scheduled drugs, meaning there is at least a theoretical possibility for developing addiction to them but in practice this is rare.  We do anticipate coming off meds in the future after stabilization of weight loss is assurred.  Finally, a tolerance can develop and people s perceived efficacy of medication can diminish.  In communication with your physician, it may be appropriate to intermittently take a break from these medications and then restart again (few weeks off then restart again) if a plateau is reached that cannot be broken through.  Each person can respond to a medication differently and to be a good option for you, it will need to be affordable, effective and well tolerated with minimal side effects.    In most cases, weight loss progress after one month and three months will be obtained and if a patient is not reaching the satisfactory progress towards weight loss, the medications may be discontinued.  The thought is that if a person is taking a weight loss medication and not receiving the potential health benefit of that drug, the side effects are not worthwhile and use should be discontinued.  On the flip side, there are many people on some weight loss medications for years because it continues to be an effective tool in their weight management and they are tolerating the  medication without any long-term side effects.  Each person's response and purpose will be evaluated.      PHENTERMINE (Adipex): approved in 1959 for appetite suppression.  It has stimulant effects and cannot be used with Ritalin, Concerta, or other stimulants.  Although it is not highly addictive, it's chemically related to amphetamines which are addictive.  Occasional dependence can develop, but rarely. The most common side effects are dry mouth, increased energy and concentration, increased pulse, and constipation.  You should not take phentermine if you have glaucoma, hyperthyroidism, or uncontrolled/untreated hypertension or overly anxious. You should stop if dramatic mood swings, severe insomnia, palpations, chest pains, visual changes or if your Blood Pressure is consistently elevated or any time it's over 160/90.   It's ok to go off the med for a few weeks and restart if efficacy is wearing off.  $24-$30 for 90 tablets at [facility]. Females are required to have reliable birth control to reduce the risk birth defects/miscarriage.      TOPIRAMATE (Topamax): Anti-seizure medication, also used to prevent migraines and sometimes for mood stabilization.  Side effects include paresthesia, glaucoma, altered concentration, attention difficulties, memory and speech problems, metabolic acidosis, depression, increase in body temperature and decrease sweating, risk of kidney stones.  Do not take Topamax while taking Depakote as this can cause high ammonia levels.  You must have reliable birth control as Topamax can cause birth defects.  If prolonged use has occurred it should be tapered off slowly to avoid withdrawal issues.  Insurance usually covers Topiramate.  At higher doses, there may be some confusion/forgetfulness associated with this so we try to limit dose to under 75mg twice daily to reduce this risk. Often covered by insurance as it's used for many reasons.  Topamax will cause carbonated beverages to  taste bad. A recheck of your kidney/electrolytes may occur within a few months of starting.    QSYMIA (Phentermine + Topamax):  See above information about phentermine and Topamax.  Most common side effects are paresthesia, dizziness, distortion of taste, insomnia, constipation, and dry mouth.  See above descriptions for the two individual agents.Females are required to have reliable birth control to reduce the risk birth defects/miscarriage.  $150-$220 per month      GLP1 Agonists:  Liraglutide (Victoza/Saxenda), Semaglutide (Ozempic/Wegovy):   Part of the family of Glucagon Like Peptide Agonists, these medications directly suppresses appetite and are often used by diabetic patients due to improvements in glucose/insulin balance.  They also slow how quickly the stomach empties so increase fullness. They may be hard to get covered for non diabetics and some plans have exclusions for weight loss purposes.  Currently, these are  injectable medications delivered via autoinjector pen. It can be very costly without insurance coverage (over $500/month).  Small risk for pancreatitis and dose should be held if increased mid abdominal pain/burning. It is not to be used if previous Multiple Endocrine Neoplasia. In rodents, may increase risk of thyroid tumors and not indicated for anyone with hx of medullary thyroid cancer as a result.  If changes in voice/swallowing should be discontinued. Reliable birth control required in women. Saxenda.com, Wegovy.com has more information on these medications.    Contrave (Bupropion/Naltrexone).    Synergistic combination of a mild appetite suppressing anti-depressant (Bupropion) whose effects are increased due to interaction with Naltrexone.  Naltrexone may have some effects on craving and is often used in addiction medicine to help previous opiate addicts be less prone to relapse as it blocks the action of opiates. Should be stopped if any need for opiate pain medication, surgery or  "planned procedures where you'll be given sedation/anesthesia. If prolonged use recommend stepping down bupropion over 2-3 weeks to limit any risk of withdrawal issues. Side effects may include dry mouth, increased heart rate, mild elevation in Blood pressure;  dizziness, ringing in the ears, anxiety (typically due to bupropion), nausea, constipation, and some get fatigued with naltrexone.  About $210 on [facility] for 120 tabs of \"Contrave\", the brand name without insurance coverage. Generic Bupropion 75mg: $25 for 120 tabs, Naltrexone: $55 for 90 tabs without insurance coverage on [employer]. Cannot be used if pregnant/trying to conceive or breast feeding.      Plenity:   Recently available October 2020, by mail order pharmacy only, but expensive. $98/month.  2-3 capsules taken 20 minutes before 2 meals daily provides crystals that expand into a gel that provides a mechanical fullness. Gel mixes with your next meal and increases satisfaction by bulking the meal up to feel bigger than it truly is. Plenity is not absorbed and gets passed through the digestive tract and excreted in stools. May cause some bloating/gas/full feeling as it behaves like a fiber in many ways. Cost not available yet. FDA cleared in March of 2019 but not available in stores as of March of 2020. Clearance safety and efficacy data done under \"Gelesis\" name. Not appropriate for people with stomach or bowel motility issues as requires you to pass it through digestive tract. [email or web address] has more information.      "

## 2022-03-23 NOTE — PROGRESS NOTES
Leah John is 41 year old  female who presents for a billable video visit today.    How would you like to obtain your AVS? MyChart  If dropped from the video visit, the video invitation should be resent by: Text to cell phone: [phone]  Will anyone else be joining your video visit? No      Video Start Time: 1:09 PM    Are there any specific questions or needs that you would like addressed at your visit today? [facility]     Provider Notes:   Bariatric Intake Visit with a History of Previous Bariatric Surgery, re-establish care     Date of visit: 3/23/2022  Physician: Manoj Stover MD, MD  Primary Care Provider:  Andra Olsen  Leah John   41 year old  female    Date of Surgery: 7/15/15  Initial Weight: 311 lbs  Initial BMI: 59.7  Today's Weight:   Wt Readings from Last 1 Encounters:   03/23/22 122.5 kg (270 lb)     Weight history:   Wt Readings from Last 4 Encounters:   03/23/22 122.5 kg (270 lb)   09/29/21 124.3 kg (274 lb)   08/17/21 124.3 kg (274 lb)   03/31/21 127 kg (279 lb 14.4 oz)      Body mass index is 51.86 kg/m .      Assessment and Plan     Assessment: Leah is a 41 year old year old female who is nearly 7 years s/p  Pavithra en Y Gastric Bypass with Dr. Olivier.  She comes in today with desire to add medication therapy and work on further weight reduction. Weight is reported at 270 lbs today, up about 20 lbs from the last time Dr. Hahn evaluated her in 2018 (250lbs at that time). She's maintaining a 41 lb reduction from her preoperative weight in 2015 but has been lost to follow up for awhile and we reviewed good bariatric methods/vitamin use that should keep her on track. Her restriction is intact and food history reveals low protein intake that increases carbohydrate craving. Her disrupted sleep with breast feeding has just ended earlier this year as her 3 yo is now weaned. We'll hold off on upper GI evaluation given her restriction, have her follow up as  "planned with dietician today for increased protein/meal focus and start appetite suppressant therapy. GLP1 agonists are not covered by her report, neither is Lomaira but phentermine is. We'll start half tablet dosing after breakfast and reasses next month on progress/efficacy/tolerability. She'll hold off on phentermine until recovered from COVID dx this week and s/p monoclonal antibody therapy..     Leah John feels as if she has not achieved the goals she hoped to accomplish through bariatric surgery and weight loss.    Encounter Diagnoses   Name Primary?     Morbid obesity with BMI of 50.0-59.9, adult (H)      Postoperative malabsorption Yes         Current Outpatient Medications:      albuterol (PROAIR HFA/PROVENTIL HFA/VENTOLIN HFA) 108 (90 Base) MCG/ACT inhaler, Inhale 1-2 puffs into the lungs every 4 hours as needed for shortness of breath / dyspnea or wheezing, Disp: 18 g, Rfl: 0     phentermine (ADIPEX-P) 37.5 MG tablet, Start half tablet daily after breakfast., Disp: 30 tablet, Rfl: 0    Plan:  1. Welcome back. Aim for 15-20g of lean protein at 3 meals daily, about every 4-5 hours and supplement w/ protein rich snacks if not hitting 60g/day. Follow up as planned with dietician.  2. Separate beverages from meals by 20-30 minutes to improve fullness/intake and reduce the nibbling tendency that results from not getting a satiating enough meal.  3. Trial of phentermine once your COVID has recovered in 7-10 days. If not requiring any more therapy/meds/decongestants then start half a tablet AFTER breakfast, about 11-12hours before your desired bedtime at the latest to reduce the risk of insomnia. Stop if mood swings, anxiety, racing heart beats, visual changes/headaches or intolerance (see below). Don't mix with other stimulants.  4. Start the Equate Complete multivitamin TWICE daily (no gummy multivitamins ever, they do not have what you need). B12 should be 1000mcg of \"sublingual\" B12 3 days weekly. D3 " should be 5000IUs/day and calcium citrate should be 500mg daily. We'll check vitamin labs and results will return about 10 days after labs are drawn at which time I'll message you.  5. 10minutes of spinning/yoga/kickboxing daily. Strength work is fine as well 2-3 days weekly for 10 minutes or more.     Return in about 6 weeks (around 5/4/2022) for Follow up.    Bariatric Surgery Review     Interim History/LifeChanges: since last visit, had baby in 2018 which was the goal for her surgery. Used feritility treatments, gained 30 lbs on Clomid. Weight stable in pregnancy. Dropped weight after surgery and breast fed/increased appetite, more middle of the night snacks. Just weaned from breast  And child just now sleeping through the night    Patient Concerns: weight gain.  Appetite (1-10): n/a  GERD: yes: spicey foods trigger. Water feels bad on empty stomach..    Reviewed whether any need/indication for screening EGD today and we will considered.  Typically, a screening EGD is recommend post op year 2-3 if no symptoms to assess health of esophagus/bariatric surgery and sooner if difficult to control GERD or persistent pain/dysphagia sx despite behavior modification.    Medication changes: none.  Monoclonal antibodies for covid yesterday, ST better.  Vitamin Intake:   B-12   occ   MVI  tries to do daily.    Vitamin D  occasional   Calcium   occ     Other  n/a            Meals come as follows: breakfast is at work at her desk, toast/cheese around 7:30am ([email or web address]).  Lunch at noon: wrap (chicken/veggie wrap). Garry Varma's $3 slim sandwich. Soup or chili. lunchables sometimes. Lots of cheese. Sweet peppers and hummus.  Supper is around 5:30pm  Evening snack: after daughter goes to bed, may have crackers/sweets.  is snacker.  LABS: ordered    Nausea no  Vomiting no  Constipation no  Diarrhea no  Rashes none  Hair Loss no  Reactive Hypoglycemia rarely as avoids sweets.    Light Headedness n/a   Moods  "good.      Most recent labs:  Lab Results   Component Value Date    WBC 7.1 08/17/2021    HGB 10.8 (L) 08/17/2021    HCT 34.2 (L) 08/17/2021    MCV 77 (L) 08/17/2021     08/17/2021     Lab Results   Component Value Date    CHOL 190 08/17/2021     Lab Results   Component Value Date    HDL 53 08/17/2021     No components found for: LDLCALC  Lab Results   Component Value Date    TRIG 85 08/17/2021     No results found for: CHOLHDL  Lab Results   Component Value Date    ALT 10 08/17/2021    AST 15 08/17/2021    ALKPHOS 97 08/17/2021     No results found for: HGBA1C  No components found for: [id number]  No components found for: [id number]  No components found for: FERRITIN  No components found for: PTH  No components found for: 72005  No components found for: 7597  Lab Results   Component Value Date    TSH 4.74 08/17/2021     No results found for: TESTOSTERONE    Habits:  Tobacco/Nicotine/THC exposure? no   NSAID use? no   Alcohol use? occ wine.   Caffeine Habits? no   Insurance     Exercise Routine: soccer w/ child. Exercise bike at home. Bunion can limit her, new shoes fit.   3 meals/day? yes  Protein 60-80g/day? no  Water Separate from meals? No.   Calorie Containing Beverages: former coke habit after daughter born \"a lot\",   Restaurant eating/wk: n/a  Sleep Habits:  Now through the night, 7hours  CPAP Use? never  Contraception: IUD  DEXA:n/a.  Discussed annual screening to start at age 45 and continue to age 55 if scoring \"low risk\". DEXA scan recommended at age 55 regardless as long as at least 2 years have transpired from their bariatric surgery.    Social History     Social History     Socioeconomic History     Marital status:      Spouse name: Not on file     Number of children: Not on file     Years of education: Not on file     Highest education level: Not on file   Occupational History     Not on file   Tobacco Use     Smoking status: Never Smoker     Smokeless tobacco: Never Used   Substance " and Sexual Activity     Alcohol use: Yes     Alcohol/week: 2.5 standard drinks     Comment: armen     Drug use: No     Sexual activity: Yes     Partners: Male     Birth control/protection: I.U.D., None   Other Topics Concern     Parent/sibling w/ CABG, MI or angioplasty before 65F 55M? No   Social History Narrative     Not on file     Social Determinants of Health     Financial Resource Strain: Not on file   Food Insecurity: Not on file   Transportation Needs: Not on file   Physical Activity: Not on file   Stress: Not on file   Social Connections: Not on file   Intimate Partner Violence: Not on file   Housing Stability: Not on file       Past Medical History     Past Medical History:   Diagnosis Date     Anisometropia      Anxiety      Cervical intraepithelial neoplasia III 2013     Depression     20s; on medications zoloft; prozac x 1 year     Depressive disorder      Dyslipidemia 2014     Hypothyroidism      Metabolic syndrome      Morbid obesity (H)      MTHFR mutation     heterozygous     PCOS (polycystic ovarian syndrome)      Polycystic ovary syndrome      Pre-diabetes      Prothrombin gene mutation (H) 01/17/2017    heterozygous     Strabismus      Problem List     Patient Active Problem List   Diagnosis     Knee pain     Morbid obesity (H)     Anisometropia     Anxiety     Hyperlipidemia with target LDL less than 160     Hyperparathyroidism (H)     Hypothyroidism     Iron deficiency anemia     Low vitamin B12 level     Metabolic syndrome     MTHFR mutation     PCOS (polycystic ovarian syndrome)     Pre-diabetes     S/P bariatric surgery     Vitamin D deficiency     Morbid obesity with BMI of 50.0-59.9, adult (H)     ALFRED III (cervical intraepithelial neoplasia III)     Medications     [unfilled]  Surgical History     Past Surgical History  She has a past surgical history that includes leep tx, cervical (2013); Excise ganglion wrist (Right); Eye surgery; Pr Lap Gastric Bypass/Pavithra-En-Y (N/A, 07/15/2015);  "Biopsy cervical, local excision, single/multiple (); and  Section (N/A, 2018).    Objective-Exam     Constitutional:  Ht 1.537 m (5' 0.5\")   Wt 122.5 kg (270 lb)   BMI 51.86 kg/m    [unfilled]   General:  Pleasant and in no acute distress   Eyes:  EOMI  ENT:  Airway patent    Neck:  Respiratory: Normal respiratory effort, no cough, .  CV:  n/a  Gastrointestinal: n/a  Musculoskeletal: muscle mass WNL  Skin: color fair hair thick,   Psychiatric: alert and oriented X3, mood and affect normal    Counseling     We reviewed the important post op bariatric recommendations:  -eating 3 meals daily  -eating protein first, getting >60gm protein daily  -eating slowly, chewing food well  -avoiding/limiting calorie containing beverages  -drinking water 15-30 minutes before or after meals  -limiting restaurant or cafeteria eating to twice a week or less    We discussed the importance of restorative sleep and stress management in maintaining a healthy weight.  We discussed the National Weight Control Registry healthy weight maintenance strategies and ways to optimize metabolism.  We discussed the importance of physical activity including cardiovascular and strength training in maintaining a healthier weight.    We discussed the importance of life-long vitamin supplementation and life-long  follow-up.    Leah was reminded that, to avoid marginal ulcers she should avoid tobacco at all, alcohol in excess, caffeine in excess, and NSAIDS (unless indicated for cardioprotection or othewise and opposed by a PPI).    Manoj Stover MD    [facility].  3/23/2022  1:09 PM  [phone] (clinic phone)  [phone] (fax)    No images are attached to the encounter.  Medical Decision Makin minutes spent on the date of the encounter doing chart review, history and exam, documentation and further activities per the note      Video-Visit Details    Type of service:  Video Visit    Video End Time " (time video stopped): 2:08 PM  Originating Location (pt. Location): Home    Distant Location (provider location):  [facility]     Platform used for Video Visit: [employer]

## 2022-03-23 NOTE — LETTER
3/23/2022         RE: Leah John  [address] [place] [address]        Dear Colleague,    Thank you for referring your patient, Leah John, to the [facility] [place]. Please see a copy of my visit note below.    Leah John is a 41 year old who is being evaluated via a billable video visit.    How would you like to obtain your AVS? MyChart  If the video visit is dropped, the invitation should be resent by: Send to e-mail at: [email or web address]  Will anyone else be joining your video visit? No      Video Start Time: 2:38p      Medical Weight Loss Initial Diet Evaluation  Assessment:  Leah is presenting today for a new weight management nutrition consultation. Pt has had an initial appointment with Dr. Stover.  Weight loss medication: Phentermine.     Personal Goals: would like to get under 200lb- move better and feel better   +weight prior to surgery was 280-290lb  +lowest weight after surgery was 200lb    Anthropometrics:    Pt's weight is 270 lbs 0 oz    BMI: Body mass index is 51.86 kg/m .   Ideal body weight: 46.6 kg (102 lb 13.5 oz)  Adjusted ideal body weight: 77 kg (169 lb 11.3 oz)  Estimated RMR (Limestone-St Jeor equation):  1819 kcals x 1.2 (sedentary) = 2182 kcals (for weight maintenance)    Recommended Protein Intake: 60-80 grams of protein/day    Medical History:  Patient Active Problem List   Diagnosis     Knee pain     Morbid obesity (H)     Anisometropia     Anxiety     Hyperlipidemia with target LDL less than 160     Hyperparathyroidism (H)     Hypothyroidism     Iron deficiency anemia     Low vitamin B12 level     Metabolic syndrome     MTHFR mutation     PCOS (polycystic ovarian syndrome)     Pre-diabetes     S/P bariatric surgery     Vitamin D deficiency     Morbid obesity with BMI of 50.0-59.9, adult (H)     ALFRED III (cervical intraepithelial neoplasia III)      HbA1c:  No results found for:  HGBA1C    Nutrition History:   Food allergies/intolerances/cultural or religous food customs: No   Weight loss history: weight loss surgery 2015 RYGB- weight re-gain with fertility drugs    +no issues with bariatric surgery- might occasionally have low blood glucose episode  +not taking bariatric vitamins- will get back on track with this    Dietary Recall:  Breakfast: toast-has COVID right now- no taste or smell right now- generally will have toast or bagel  Lunch: chicken wrap OR turkey sandwich- crackers, cheese and meat with peppers and hummus  Dinner: something quick- chicken nuggets in air fryer, fruit and occasionally veggies  Typical Snacks: not snacking a lot- might have chex mix or pretzels, might have a cookie or two    +takes about 20-30 to eat a meal  +eating about 1- 1 1/2 cup    Beverages: pritesh latte- milk with tea mix, water- 60oz per day  +not  fluids    Exercise: none    Nutrition Diagnosis (PES statement):   Overweight/Obesity (NC 3.3) related to overeating and poor lifestyle habits as evidenced by patient report of large portions, inconsistent meals and BMI 51.86    Nutrition Intervention  1. Food and/or Nutrient Delivery   a. Placed emphasis on importance of developing a healthy meal routine, aiming for 3 meals a day and no snacks.  b. Discussed using a protein supplement as a meal replacement.  2. Nutrition Education   a. Discussed with patient how to build a meal: the importance of including a lean/low fat protein at each meal, include a source of vegetables at a minimum of lunch and dinner and limiting carbohydrate intake  b. Educated on sources of lean protein, portion sizes, the amount of grams found in each source. Recommend patient to aim for 20-30g protein at each meal.  c. Discussed the importance of adequate hydration, with emphasis on drinking 64oz of water or zero calorie beverages per day.  3. Nutrition Counseling   a. Encouraged importance of developing routine exercise  for health benefits and weight loss.      Goals established by patient:   1. 5 days per week follow meal plan developed  2. Increase exercise- walk on lunch break, exercise bike 3 evenings per week    Handouts provided:  18 months post op RYGB  Meal ideas  2 week liquid diet    Assessment/Plan:    Pt will follow up in 2 month(s) with bariatrician and 1 month(s) with dietitian.       Video-Visit Details    Type of service:  Video Visit    Video End Time:3:05p    Originating Location (pt. Location): Home    Distant Location (provider location):  [facility] [place]     Platform used for Video Visit: Mo PAN RD        Again, thank you for allowing me to participate in the care of your patient.        Sincerely,        JACLYN PAN RD

## 2022-03-23 NOTE — PROGRESS NOTES
Leah John is a 41 year old who is being evaluated via a billable video visit.    How would you like to obtain your AVS? MyChart  If the video visit is dropped, the invitation should be resent by: Send to e-mail at: [email or web address]  Will anyone else be joining your video visit? No      Video Start Time: 2:38p      Medical Weight Loss Initial Diet Evaluation  Assessment:  Leah is presenting today for a new weight management nutrition consultation. Pt has had an initial appointment with Dr. Stover.  Weight loss medication: Phentermine.     Personal Goals: would like to get under 200lb- move better and feel better   +weight prior to surgery was 280-290lb  +lowest weight after surgery was 200lb    Anthropometrics:    Pt's weight is 270 lbs 0 oz    BMI: Body mass index is 51.86 kg/m .   Ideal body weight: 46.6 kg (102 lb 13.5 oz)  Adjusted ideal body weight: 77 kg (169 lb 11.3 oz)  Estimated RMR ([place]-St Jeor equation):  1819 kcals x 1.2 (sedentary) = 2182 kcals (for weight maintenance)    Recommended Protein Intake: 60-80 grams of protein/day    Medical History:  Patient Active Problem List   Diagnosis     Knee pain     Morbid obesity (H)     Anisometropia     Anxiety     Hyperlipidemia with target LDL less than 160     Hyperparathyroidism (H)     Hypothyroidism     Iron deficiency anemia     Low vitamin B12 level     Metabolic syndrome     MTHFR mutation     PCOS (polycystic ovarian syndrome)     Pre-diabetes     S/P bariatric surgery     Vitamin D deficiency     Morbid obesity with BMI of 50.0-59.9, adult (H)     ALFRED III (cervical intraepithelial neoplasia III)      HbA1c:  No results found for: HGBA1C    Nutrition History:   Food allergies/intolerances/cultural or religous food customs: No   Weight loss history: weight loss surgery 2015 RYGB- weight re-gain with fertility drugs    +no issues with bariatric surgery- might occasionally have low blood glucose episode  +not taking bariatric  vitamins- will get back on track with this    Dietary Recall:  Breakfast: toast-has COVID right now- no taste or smell right now- generally will have toast or bagel  Lunch: chicken wrap OR turkey sandwich- crackers, cheese and meat with peppers and hummus  Dinner: something quick- chicken nuggets in air fryer, fruit and occasionally veggies  Typical Snacks: not snacking a lot- might have chex mix or pretzels, might have a cookie or two    +takes about 20-30 to eat a meal  +eating about 1- 1 1/2 cup    Beverages: pritesh latte- milk with tea mix, water- 60oz per day  +not  fluids    Exercise: none    Nutrition Diagnosis (PES statement):   Overweight/Obesity (NC 3.3) related to overeating and poor lifestyle habits as evidenced by patient report of large portions, inconsistent meals and BMI 51.86    Nutrition Intervention  1. Food and/or Nutrient Delivery   a. Placed emphasis on importance of developing a healthy meal routine, aiming for 3 meals a day and no snacks.  b. Discussed using a protein supplement as a meal replacement.  2. Nutrition Education   a. Discussed with patient how to build a meal: the importance of including a lean/low fat protein at each meal, include a source of vegetables at a minimum of lunch and dinner and limiting carbohydrate intake  b. Educated on sources of lean protein, portion sizes, the amount of grams found in each source. Recommend patient to aim for 20-30g protein at each meal.  c. Discussed the importance of adequate hydration, with emphasis on drinking 64oz of water or zero calorie beverages per day.  3. Nutrition Counseling   a. Encouraged importance of developing routine exercise for health benefits and weight loss.      Goals established by patient:   1. 5 days per week follow meal plan developed  2. Increase exercise- walk on lunch break, exercise bike 3 evenings per week    Handouts provided:  18 months post op RYGB  Meal ideas  2 week liquid  diet    Assessment/Plan:    Pt will follow up in 2 month(s) with bariatrician and 1 month(s) with dietitian.       Video-Visit Details    Type of service:  Video Visit    Video End Time:3:05p    Originating Location (pt. Location): Home    Distant Location (provider location):  [facility] SURGERY CLIN[facility] AND BARIATRICS CARE [place]     Platform used for Video Visit: Mo PAN RD

## 2022-03-23 NOTE — LETTER
3/23/2022         RE: Leah John  [address] [place] [address]        Dear Colleague,    Thank you for referring your patient, Leah John, to the [facility] SURGERY CLINIC AND BARIATRICS CARE [place]. Please see a copy of my visit note below.    Leah John is 41 year old  female who presents for a billable video visit today.    How would you like to obtain your AVS? MyChart  If dropped from the video visit, the video invitation should be resent by: Text to cell phone: [phone]  Will anyone else be joining your video visit? No      Video Start Time: 1:09 PM    Are there any specific questions or needs that you would like addressed at your visit today? Options for Medical Weight Management     Provider Notes:   Bariatric Intake Visit with a History of Previous Bariatric Surgery, re-establish care     Date of visit: 3/23/2022  Physician: Manoj Stover MD, MD  Primary Care Provider:  Andra Olsen  Leah John   41 year old  female    Date of Surgery: 7/15/15  Initial Weight: 311 lbs  Initial BMI: 59.7  Today's Weight:   Wt Readings from Last 1 Encounters:   03/23/22 122.5 kg (270 lb)     Weight history:   Wt Readings from Last 4 Encounters:   03/23/22 122.5 kg (270 lb)   09/29/21 124.3 kg (274 lb)   08/17/21 124.3 kg (274 lb)   03/31/21 127 kg (279 lb 14.4 oz)      Body mass index is 51.86 kg/m .      Assessment and Plan     Assessment: Leah is a 41 year old year old female who is nearly 7 years s/p  Pavithra en Y Gastric Bypass with Dr. Olivier.  She comes in today with desire to add medication therapy and work on further weight reduction. Weight is reported at 270 lbs today, up about 20 lbs from the last time Dr. Hahn evaluated her in 2018 (250lbs at that time). She's maintaining a 41 lb reduction from her preoperative weight in 2015 but has been lost to follow up for awhile and we reviewed good bariatric methods/vitamin use that should keep her on track.  Her restriction is intact and food history reveals low protein intake that increases carbohydrate craving. Her disrupted sleep with breast feeding has just ended earlier this year as her 3 yo is now weaned. We'll hold off on upper GI evaluation given her restriction, have her follow up as planned with dietician today for increased protein/meal focus and start appetite suppressant therapy. GLP1 agonists are not covered by her report, neither is Lomaira but phentermine is. We'll start half tablet dosing after breakfast and reasses next month on progress/efficacy/tolerability. She'll hold off on phentermine until recovered from COVID dx this week and s/p monoclonal antibody therapy..     Leah John feels as if she has not achieved the goals she hoped to accomplish through bariatric surgery and weight loss.    Encounter Diagnoses   Name Primary?     Morbid obesity with BMI of 50.0-59.9, adult (H)      Postoperative malabsorption Yes         Current Outpatient Medications:      albuterol (PROAIR HFA/PROVENTIL HFA/VENTOLIN HFA) 108 (90 Base) MCG/ACT inhaler, Inhale 1-2 puffs into the lungs every 4 hours as needed for shortness of breath / dyspnea or wheezing, Disp: 18 g, Rfl: 0     phentermine (ADIPEX-P) 37.5 MG tablet, Start half tablet daily after breakfast., Disp: 30 tablet, Rfl: 0    Plan:  1. Welcome back. Aim for 15-20g of lean protein at 3 meals daily, about every 4-5 hours and supplement w/ protein rich snacks if not hitting 60g/day. Follow up as planned with dietician.  2. Separate beverages from meals by 20-30 minutes to improve fullness/intake and reduce the nibbling tendency that results from not getting a satiating enough meal.  3. Trial of phentermine once your COVID has recovered in 7-10 days. If not requiring any more therapy/meds/decongestants then start half a tablet AFTER breakfast, about 11-12hours before your desired bedtime at the latest to reduce the risk of insomnia. Stop if mood swings,  "anxiety, racing heart beats, visual changes/headaches or intolerance (see below). Don't mix with other stimulants.  4. Start the Equate Complete multivitamin TWICE daily (no gummy multivitamins ever, they do not have what you need). B12 should be 1000mcg of \"sublingual\" B12 3 days weekly. D3 should be 5000IUs/day and calcium citrate should be 500mg daily. We'll check vitamin labs and results will return about 10 days after labs are drawn at which time I'll message you.  5. 10minutes of spinning/yoga/kickboxing daily. Strength work is fine as well 2-3 days weekly for 10 minutes or more.     Return in about 6 weeks (around 5/4/2022) for Follow up.    Bariatric Surgery Review     Interim History/LifeChanges: since last visit, had baby in 2018 which was the goal for her surgery. Used feritility treatments, gained 30 lbs on Clomid. Weight stable in pregnancy. Dropped weight after surgery and breast fed/increased appetite, more middle of the night snacks. Just weaned from breast  And child just now sleeping through the night    Patient Concerns: weight gain.  Appetite (1-10): n/a  GERD: yes: spicey foods trigger. Water feels bad on empty stomach..    Reviewed whether any need/indication for screening EGD today and we will considered.  Typically, a screening EGD is recommend post op year 2-3 if no symptoms to assess health of esophagus/bariatric surgery and sooner if difficult to control GERD or persistent pain/dysphagia sx despite behavior modification.    Medication changes: none.  Monoclonal antibodies for covid yesterday, ST better.  Vitamin Intake:   B-12   occ   MVI  tries to do daily.    Vitamin D  occasional   Calcium   occ     Other  n/a            Meals come as follows: breakfast is at work at her desk, toast/cheese around 7:30am ([employer]).  Lunch at noon: wrap (chicken/veggie wrap). Garry Varma's $3 slim sandwich. Soup or chili. lunchables sometimes. Lots of cheese. Sweet peppers and hummus.  Supper is " "around 5:30pm  Evening snack: after daughter goes to bed, may have crackers/sweets.  is snacker.  LABS: ordered    Nausea no  Vomiting no  Constipation no  Diarrhea no  Rashes none  Hair Loss no  Reactive Hypoglycemia rarely as avoids sweets.    Light Headedness n/a   Moods good.      Most recent labs:  Lab Results   Component Value Date    WBC 7.1 08/17/2021    HGB 10.8 (L) 08/17/2021    HCT 34.2 (L) 08/17/2021    MCV 77 (L) 08/17/2021     08/17/2021     Lab Results   Component Value Date    CHOL 190 08/17/2021     Lab Results   Component Value Date    HDL 53 08/17/2021     No components found for: LDLCALC  Lab Results   Component Value Date    TRIG 85 08/17/2021     No results found for: CHOLHDL  Lab Results   Component Value Date    ALT 10 08/17/2021    AST 15 08/17/2021    ALKPHOS 97 08/17/2021     No results found for: HGBA1C  No components found for: [id number]  No components found for: MBJXJCTV06ST  No components found for: FERRITIN  No components found for: PTH  No components found for: 22463  No components found for: 7597  Lab Results   Component Value Date    TSH 4.74 08/17/2021     No results found for: TESTOSTERONE    Habits:  Tobacco/Nicotine/THC exposure? no   NSAID use? no   Alcohol use? occ wine.   Caffeine Habits? no   Insurance     Exercise Routine: soccer w/ child. Exercise bike at home. Bunion can limit her, new shoes fit.   3 meals/day? yes  Protein 60-80g/day? no  Water Separate from meals? No.   Calorie Containing Beverages: former coke habit after daughter born \"a lot\",   Restaurant eating/wk: n/a  Sleep Habits:  Now through the night, 7hours  CPAP Use? never  Contraception: IUD  DEXA:n/a.  Discussed annual screening to start at age 45 and continue to age 55 if scoring \"low risk\". DEXA scan recommended at age 55 regardless as long as at least 2 years have transpired from their bariatric surgery.    Social History     Social History     Socioeconomic History     Marital status: "      Spouse name: Not on file     Number of children: Not on file     Years of education: Not on file     Highest education level: Not on file   Occupational History     Not on file   Tobacco Use     Smoking status: Never Smoker     Smokeless tobacco: Never Used   Substance and Sexual Activity     Alcohol use: Yes     Alcohol/week: 2.5 standard drinks     Comment: armen     Drug use: No     Sexual activity: Yes     Partners: Male     Birth control/protection: I.U.D., None   Other Topics Concern     Parent/sibling w/ CABG, MI or angioplasty before 65F 55M? No   Social History Narrative     Not on file     Social Determinants of Health     Financial Resource Strain: Not on file   Food Insecurity: Not on file   Transportation Needs: Not on file   Physical Activity: Not on file   Stress: Not on file   Social Connections: Not on file   Intimate Partner Violence: Not on file   Housing Stability: Not on file       Past Medical History     Past Medical History:   Diagnosis Date     Anisometropia      Anxiety      Cervical intraepithelial neoplasia III 2013     Depression     20s; on medications zoloft; prozac x 1 year     Depressive disorder      Dyslipidemia 2014     Hypothyroidism      Metabolic syndrome      Morbid obesity (H)      MTHFR mutation     heterozygous     PCOS (polycystic ovarian syndrome)      Polycystic ovary syndrome      Pre-diabetes      Prothrombin gene mutation (H) 01/17/2017    heterozygous     Strabismus      Problem List     Patient Active Problem List   Diagnosis     Knee pain     Morbid obesity (H)     Anisometropia     Anxiety     Hyperlipidemia with target LDL less than 160     Hyperparathyroidism (H)     Hypothyroidism     Iron deficiency anemia     Low vitamin B12 level     Metabolic syndrome     MTHFR mutation     PCOS (polycystic ovarian syndrome)     Pre-diabetes     S/P bariatric surgery     Vitamin D deficiency     Morbid obesity with BMI of 50.0-59.9, adult (H)     ALFRED III (cervical  "intraepithelial neoplasia III)     Medications     [unfilled]  Surgical History     Past Surgical History  She has a past surgical history that includes leep tx, cervical (); Excise ganglion wrist (Right); Eye surgery; Pr Lap Gastric Bypass/Pavithra-En-Y (N/A, 07/15/2015); Biopsy cervical, local excision, single/multiple (); and  Section (N/A, 2018).    Objective-Exam     Constitutional:  Ht 1.537 m (5' 0.5\")   Wt 122.5 kg (270 lb)   BMI 51.86 kg/m    [unfilled]   General:  Pleasant and in no acute distress   Eyes:  EOMI  ENT:  Airway patent    Neck:  Respiratory: Normal respiratory effort, no cough, .  CV:  n/a  Gastrointestinal: n/a  Musculoskeletal: muscle mass WNL  Skin: color fair hair thick,   Psychiatric: alert and oriented X3, mood and affect normal    Counseling     We reviewed the important post op bariatric recommendations:  -eating 3 meals daily  -eating protein first, getting >60gm protein daily  -eating slowly, chewing food well  -avoiding/limiting calorie containing beverages  -drinking water 15-30 minutes before or after meals  -limiting restaurant or cafeteria eating to twice a week or less    We discussed the importance of restorative sleep and stress management in maintaining a healthy weight.  We discussed the National Weight Control Registry healthy weight maintenance strategies and ways to optimize metabolism.  We discussed the importance of physical activity including cardiovascular and strength training in maintaining a healthier weight.    We discussed the importance of life-long vitamin supplementation and life-long  follow-up.    Leah was reminded that, to avoid marginal ulcers she should avoid tobacco at all, alcohol in excess, caffeine in excess, and NSAIDS (unless indicated for cardioprotection or othewise and opposed by a PPI).    Manoj Stover MD    [facility].  3/23/2022  1:09 PM  [phone] (clinic phone)  [phone] " (fax)    No images are attached to the encounter.  Medical Decision Makin minutes spent on the date of the encounter doing chart review, history and exam, documentation and further activities per the note      Video-Visit Details    Type of service:  Video Visit    Video End Time (time video stopped): 2:08 PM  Originating Location (pt. Location): Home    Distant Location (provider location):  [facility] SURGERY [facility] AND BARIATRICS [facility]     Platform used for Video Visit: [employer]      Again, thank you for allowing me to participate in the care of your patient.        Sincerely,        Manoj Stover MD

## 2022-03-29 ENCOUNTER — LAB (OUTPATIENT)
Dept: LAB | Facility: CLINIC | Age: 42
End: 2022-03-29
Payer: COMMERCIAL

## 2022-03-29 DIAGNOSIS — K91.2 POSTOPERATIVE MALABSORPTION: ICD-10-CM

## 2022-03-29 LAB
ALBUMIN SERPL-MCNC: 3.5 G/DL (ref 3.5–5)
ALP SERPL-CCNC: 96 U/L (ref 45–120)
ALT SERPL W P-5'-P-CCNC: 11 U/L (ref 0–45)
ANION GAP SERPL CALCULATED.3IONS-SCNC: 11 MMOL/L (ref 5–18)
AST SERPL W P-5'-P-CCNC: 16 U/L (ref 0–40)
BILIRUB SERPL-MCNC: 0.4 MG/DL (ref 0–1)
BUN SERPL-MCNC: 11 MG/DL (ref 8–22)
CALCIUM SERPL-MCNC: 9.1 MG/DL (ref 8.5–10.5)
CHLORIDE BLD-SCNC: 105 MMOL/L (ref 98–107)
CO2 SERPL-SCNC: 24 MMOL/L (ref 22–31)
CREAT SERPL-MCNC: 0.75 MG/DL (ref 0.6–1.1)
ERYTHROCYTE [DISTWIDTH] IN BLOOD BY AUTOMATED COUNT: 15 % (ref 10–15)
FERRITIN SERPL-MCNC: 7 NG/ML (ref 10–130)
GFR SERPL CREATININE-BSD FRML MDRD: >90 ML/MIN/1.73M2
GLUCOSE BLD-MCNC: 94 MG/DL (ref 70–125)
HBA1C MFR BLD: 5.5 % (ref 0–5.6)
HCT VFR BLD AUTO: 35.4 % (ref 35–47)
HGB BLD-MCNC: 10.9 G/DL (ref 11.7–15.7)
IRON SATN MFR SERPL: 6 % (ref 15–46)
IRON SERPL-MCNC: 27 UG/DL (ref 35–180)
MCH RBC QN AUTO: 23.2 PG (ref 26.5–33)
MCHC RBC AUTO-ENTMCNC: 30.8 G/DL (ref 31.5–36.5)
MCV RBC AUTO: 75 FL (ref 78–100)
PLATELET # BLD AUTO: 354 10E3/UL (ref 150–450)
POTASSIUM BLD-SCNC: 4.2 MMOL/L (ref 3.5–5)
PROT SERPL-MCNC: 6.5 G/DL (ref 6–8)
PTH-INTACT SERPL-MCNC: 120 PG/ML (ref 10–86)
RBC # BLD AUTO: 4.7 10E6/UL (ref 3.8–5.2)
SODIUM SERPL-SCNC: 140 MMOL/L (ref 136–145)
TIBC SERPL-MCNC: 418 UG/DL (ref 240–430)
TSH SERPL DL<=0.005 MIU/L-ACNC: 4.57 UIU/ML (ref 0.3–5)
VIT B12 SERPL-MCNC: 738 PG/ML (ref 213–816)
WBC # BLD AUTO: 6.9 10E3/UL (ref 4–11)

## 2022-03-29 PROCEDURE — 85027 COMPLETE CBC AUTOMATED: CPT

## 2022-03-29 PROCEDURE — 84443 ASSAY THYROID STIM HORMONE: CPT

## 2022-03-29 PROCEDURE — 83550 IRON BINDING TEST: CPT

## 2022-03-29 PROCEDURE — 83036 HEMOGLOBIN GLYCOSYLATED A1C: CPT

## 2022-03-29 PROCEDURE — 84590 ASSAY OF VITAMIN A: CPT | Mod: 90

## 2022-03-29 PROCEDURE — 36415 COLL VENOUS BLD VENIPUNCTURE: CPT

## 2022-03-29 PROCEDURE — 82306 VITAMIN D 25 HYDROXY: CPT

## 2022-03-29 PROCEDURE — 84425 ASSAY OF VITAMIN B-1: CPT | Mod: 90

## 2022-03-29 PROCEDURE — 80053 COMPREHEN METABOLIC PANEL: CPT

## 2022-03-29 PROCEDURE — 99000 SPECIMEN HANDLING OFFICE-LAB: CPT

## 2022-03-29 PROCEDURE — 82728 ASSAY OF FERRITIN: CPT

## 2022-03-29 PROCEDURE — 82607 VITAMIN B-12: CPT

## 2022-03-29 PROCEDURE — 83970 ASSAY OF PARATHORMONE: CPT

## 2022-03-29 PROCEDURE — 84630 ASSAY OF ZINC: CPT | Mod: 90

## 2022-03-30 LAB — DEPRECATED CALCIDIOL+CALCIFEROL SERPL-MC: 30 UG/L (ref 20–75)

## 2022-03-31 LAB
ANNOTATION COMMENT IMP: NORMAL
RETINYL PALMITATE SERPL-MCNC: <0.02 MG/L
VIT A SERPL-MCNC: 0.47 MG/L
ZINC SERPL-MCNC: 78.4 UG/DL

## 2022-04-02 DIAGNOSIS — R06.02 SOB (SHORTNESS OF BREATH): ICD-10-CM

## 2022-04-02 DIAGNOSIS — U07.1 INFECTION DUE TO 2019 NOVEL CORONAVIRUS: ICD-10-CM

## 2022-04-03 DIAGNOSIS — R10.13 EPIGASTRIC PAIN: Primary | ICD-10-CM

## 2022-04-03 LAB — VIT B1 PYROPHOSHATE BLD-SCNC: 101 NMOL/L

## 2022-04-03 NOTE — PROGRESS NOTES
Will place labs and ruq ultrasound to evaluate her burning epigastric pain radiating into the back.  Manoj Stover MD

## 2022-04-04 NOTE — TELEPHONE ENCOUNTER
"Routing refill request to provider for review/approval because:  Early refill requested.    Last Written Prescription Date:  3/19/22  Last Fill Quantity: 18 g,  # refills: 0   Last office visit provider:  9/29/21     Requested Prescriptions   Pending Prescriptions Disp Refills     VENTOLIN  (90 Base) MCG/ACT inhaler [Pharmacy Med Name: VENTOLIN HFA INH W/DOS CTR 200PUFFS] 18 g 0     Sig: INHALE 1 TO 2 PUFFS INTO THE LUNGS EVERY 4 HOURS AS NEEDED FOR SHORTNESS OF BREATH OR DIFFICULT BREATHING OR WHEEZING       Asthma Maintenance Inhalers - Anticholinergics Passed - 4/4/2022 10:22 AM        Passed - Patient is age 12 years or older        Passed - Recent (12 mo) or future (30 days) visit within the authorizing provider's specialty     Patient has had an office visit with the authorizing provider or a provider within the authorizing providers department within the previous 12 mos or has a future within next 30 days. See \"Patient Info\" tab in inbasket, or \"Choose Columns\" in Meds & Orders section of the refill encounter.              Passed - Medication is active on med list       Short-Acting Beta Agonist Inhalers Protocol  Passed - 4/4/2022 10:22 AM        Passed - Patient is age 12 or older        Passed - Recent (12 mo) or future (30 days) visit within the authorizing provider's specialty     Patient has had an office visit with the authorizing provider or a provider within the authorizing providers department within the previous 12 mos or has a future within next 30 days. See \"Patient Info\" tab in inbasket, or \"Choose Columns\" in Meds & Orders section of the refill encounter.              Passed - Medication is active on med list             Julio César Sanford RN 04/04/22 10:22 AM  "

## 2022-04-05 RX ORDER — ALBUTEROL SULFATE 90 UG/1
AEROSOL, METERED RESPIRATORY (INHALATION)
Qty: 18 G | Refills: 0 | Status: SHIPPED | OUTPATIENT
Start: 2022-04-05 | End: 2022-05-11

## 2022-04-06 RX ORDER — BACILLUS COAGULANS 1B CELL
CAPSULE ORAL
Qty: 120 TABLET | Refills: 0 | Status: SHIPPED | OUTPATIENT
Start: 2022-04-06 | End: 2022-07-28

## 2022-04-27 ENCOUNTER — VIRTUAL VISIT (OUTPATIENT)
Dept: SURGERY | Facility: CLINIC | Age: 42
End: 2022-04-27
Payer: COMMERCIAL

## 2022-04-27 DIAGNOSIS — E66.01 MORBID OBESITY WITH BMI OF 50.0-59.9, ADULT (H): ICD-10-CM

## 2022-04-27 DIAGNOSIS — Z98.84 BARIATRIC SURGERY STATUS: Primary | ICD-10-CM

## 2022-04-27 PROCEDURE — 97803 MED NUTRITION INDIV SUBSEQ: CPT | Mod: GT | Performed by: DIETITIAN, REGISTERED

## 2022-04-27 NOTE — LETTER
4/27/2022         RE: Leah John  [address] [place] [address]        Dear Colleague,    Thank you for referring your patient, Leah John, to the [facility] SURGERY CLINIC AND BARIATRICS CARE [place]. Please see a copy of my visit note below.    Leah John is a 41 year old who is being evaluated via a billable video visit.        How would you like to obtain your AVS? MyChart  If the video visit is dropped, the invitation should be resent by: Send to e-mail at: [email or web address]  Will anyone else be joining your video visit? No      Video Start Time: 12:21 PM      Medical  Weight Loss Follow-Up Diet Evaluation  Assessment:  Leah is presenting today for a follow up weight management nutrition consultation. Pt has had an initial appointment with Dr. Stover    +RYGB in 2015- taking vitamins 5 days per week  Weight loss medication: Phentermine- states this is going well, not taking some days  Pt's weight is 270lb- no new weight as she doesn't have a scale- but noticing face thinner, clothes fitting looser    BMI: 51.86  Ideal body weight: 46.6 kg (102 lb 13.5 oz)  Adjusted ideal body weight: 77 kg (169 lb 11.3 oz)    Estimated RMR ([place]-St Jeor equation):   1819 kcals x 1.2 (sedentary) = 2182 kcals (for weight maintenance)  Recommended Protein Intake: 60-80 grams of protein/day  Patient Active Problem List:  Patient Active Problem List   Diagnosis     Knee pain     Morbid obesity (H)     Anisometropia     Anxiety     Hyperlipidemia with target LDL less than 160     Hyperparathyroidism (H)     Hypothyroidism     Iron deficiency anemia     Low vitamin B12 level     Metabolic syndrome     MTHFR mutation     PCOS (polycystic ovarian syndrome)     Pre-diabetes     S/P bariatric surgery     Vitamin D deficiency     Morbid obesity with BMI of 50.0-59.9, adult (H)     ALFRED III (cervical intraepithelial neoplasia III)     Progress on goals from last visit: states it is  easy to be structured when at work  +working on portion control and can feel self getting full  +struggling in the evening with boredom and exhaustion- making better choices- leans more toward salty snacks  Goals:  1. Follow meal plan developed- goal met, doing well for breakfast and lunch- dinner can be a struggle sometimes  +states she is feeling better, less achy    Dietary Recall:  Breakfast: yogurt with protein granola (30g)  Lunch: rotisserie chicken with dinorah, cheese and veggies- buffalo ranch sauce  +veggies with hummus, crackers and cheese  Dinner: chicken nuggets- with panko breading  Other choices: pot roast with veggies- focus on protein first and fruit or veggie on the side, working   Exercise: states this is not going well- getting outside and playing more weather permitting  Nutrition Diagnosis:  Overweight/Obesity (NC 3.3) related to overeating and poor lifestyle habits as evidenced by patient report of large portions, inconsistent meals and BMI 51.86    Intervention:  1. Food and/or nutrient delivery: discussed meal planning for dinners, snack ideas for the evening  2. Nutrition counseling: support for continued success, goal setting    Monitoring/Evaluation:    Goals:  1. Focus on planning dinner meals- 3 nights per week, try new recipes  2. Increase exercise    Patient to follow up in 1 month(s) with bariatrician and 2 month(s) with LETY      Video-Visit Details    Type of service:  Video Visit    Video End Time:12:44 PM    Originating Location (pt. Location): Home    Distant Location (provider location):  [facility] SURGERY [facility] AND BARIATRICS CARE [place]     Platform used for Video Visit: Mo PAN RD        Again, thank you for allowing me to participate in the care of your patient.        Sincerely,        JACLYN PAN RD

## 2022-04-27 NOTE — PROGRESS NOTES
Leah John is a 41 year old who is being evaluated via a billable video visit.        How would you like to obtain your AVS? MyChart  If the video visit is dropped, the invitation should be resent by: Send to e-mail at: [email or web address]  Will anyone else be joining your video visit? No      Video Start Time: 12:21 PM      Medical  Weight Loss Follow-Up Diet Evaluation  Assessment:  Leah is presenting today for a follow up weight management nutrition consultation. Pt has had an initial appointment with Dr. Stover    +RYGB in 2015- taking vitamins 5 days per week  Weight loss medication: Phentermine- states this is going well, not taking some days  Pt's weight is 270lb- no new weight as she doesn't have a scale- but noticing face thinner, clothes fitting looser    BMI: 51.86  Ideal body weight: 46.6 kg (102 lb 13.5 oz)  Adjusted ideal body weight: 77 kg (169 lb 11.3 oz)    Estimated RMR (Gosper-St Jeor equation):   1819 kcals x 1.2 (sedentary) = 2182 kcals (for weight maintenance)  Recommended Protein Intake: 60-80 grams of protein/day  Patient Active Problem List:  Patient Active Problem List   Diagnosis     Knee pain     Morbid obesity (H)     Anisometropia     Anxiety     Hyperlipidemia with target LDL less than 160     Hyperparathyroidism (H)     Hypothyroidism     Iron deficiency anemia     Low vitamin B12 level     Metabolic syndrome     MTHFR mutation     PCOS (polycystic ovarian syndrome)     Pre-diabetes     S/P bariatric surgery     Vitamin D deficiency     Morbid obesity with BMI of 50.0-59.9, adult (H)     ALFRED III (cervical intraepithelial neoplasia III)     Progress on goals from last visit: states it is easy to be structured when at work  +working on portion control and can feel self getting full  +struggling in the evening with boredom and exhaustion- making better choices- leans more toward salty snacks  Goals:  1. Follow meal plan developed- goal met, doing well for breakfast and  lunch- dinner can be a struggle sometimes  +states she is feeling better, less achy    Dietary Recall:  Breakfast: yogurt with protein granola (30g)  Lunch: rotisserie chicken with dinorah, cheese and veggies- buffalo ranch sauce  +veggies with hummus, crackers and cheese  Dinner: chicken nuggets- with panko breading  Other choices: pot roast with veggies- focus on protein first and fruit or veggie on the side, working   Exercise: states this is not going well- getting outside and playing more weather permitting  Nutrition Diagnosis:  Overweight/Obesity (NC 3.3) related to overeating and poor lifestyle habits as evidenced by patient report of large portions, inconsistent meals and BMI 51.86    Intervention:  1. Food and/or nutrient delivery: discussed meal planning for dinners, snack ideas for the evening  2. Nutrition counseling: support for continued success, goal setting    Monitoring/Evaluation:    Goals:  1. Focus on planning dinner meals- 3 nights per week, try new recipes  2. Increase exercise    Patient to follow up in 1 month(s) with bariatrician and 2 month(s) with LETY      Video-Visit Details    Type of service:  Video Visit    Video End Time:12:44 PM    Originating Location (pt. Location): Home    Distant Location (provider location):  [facility] [place]     Platform used for Video Visit: Mo PAN RD

## 2022-05-11 ENCOUNTER — VIRTUAL VISIT (OUTPATIENT)
Dept: SURGERY | Facility: CLINIC | Age: 42
End: 2022-05-11
Payer: COMMERCIAL

## 2022-05-11 DIAGNOSIS — E66.01 MORBID OBESITY WITH BMI OF 50.0-59.9, ADULT (H): ICD-10-CM

## 2022-05-11 DIAGNOSIS — K91.2 POSTOPERATIVE MALABSORPTION: Primary | ICD-10-CM

## 2022-05-11 DIAGNOSIS — E21.1 SECONDARY HYPERPARATHYROIDISM, NON-RENAL (H): ICD-10-CM

## 2022-05-11 DIAGNOSIS — D50.8 OTHER IRON DEFICIENCY ANEMIA: ICD-10-CM

## 2022-05-11 PROCEDURE — 99214 OFFICE O/P EST MOD 30 MIN: CPT | Mod: 95 | Performed by: EMERGENCY MEDICINE

## 2022-05-11 RX ORDER — PHENTERMINE HYDROCHLORIDE 37.5 MG/1
TABLET ORAL
Qty: 45 TABLET | Refills: 0 | Status: SHIPPED | OUTPATIENT
Start: 2022-05-11 | End: 2022-08-01

## 2022-05-11 NOTE — PROGRESS NOTES
Leah John is 41 year old  female who presents for a billable video visit today.    How would you like to obtain your AVS? MyChart  If dropped from the video visit, the video invitation should be resent by: Send to e-mail at: [email or web address]  Will anyone else be joining your video visit? No      Video Start Time: 1:15 PM    Are there any specific questions or needs that you would like addressed at your visit today? No    Provider Notes:   Bariatric Follow Up Visit with a History of Previous Bariatric Surgery     Date of visit: 5/11/2022  Physician: Manoj Stover MD, MD  Primary Care Provider:  Adnra Olsen  Leah Duranjane   41 year old  female    Date of Surgery: 7/15/15  Initial Weight: 311 lbs  Initial BMI: 59.7  Today's Weight:   Wt Readings from Last 1 Encounters:   03/23/22 122.5 kg (270 lb)     Weight history:   Wt Readings from Last 4 Encounters:   03/23/22 122.5 kg (270 lb)   03/23/22 122.5 kg (270 lb)   09/29/21 124.3 kg (274 lb)   08/17/21 124.3 kg (274 lb)      There is no height or weight on file to calculate BMI.      Assessment and Plan     Assessment: Leah is a 41 year old year old female who is nearly 7 years s/p  Pavithra en Y Gastric Bypass with Dr. Olivier. At our visit in March this year, she started w/ phentermine therapy at 270 lbs and increased attention to her vitamin regimen which had been a bit suboptimal recently. The transient RUQ pain she called about after our March visit has resolved.  Subjectively, weight loss is occurring but she lacks a scale. No ill effects noted thus far from phentermine so we'll continue therapy until our in person visit early August and recheck PTH, anemia labs ahead of that visit..     Leah John feels as if she is getting better in  Achieving the goals she hoped to accomplish through bariatric surgery and weight loss.    Encounter Diagnoses   Name Primary?     Postoperative malabsorption Yes     Other iron deficiency anemia       "Secondary hyperparathyroidism, non-renal (H)      Morbid obesity with BMI of 50.0-59.9, adult (H)          Current Outpatient Medications:      ferrous fumarate 65 mg, [place]. FE,-Vitamin C 125 mg (VITRON-C)  MG TABS tablet, Use twice daily, 20minutes after meals for 30 days then reduce to once daily dosing thereafter for 2 months until gone., Disp: 120 tablet, Rfl: 0     phentermine (ADIPEX-P) 37.5 MG tablet, Start half tablet daily after breakfast., Disp: 45 tablet, Rfl: 0    Plan:  1. Continue mindful bariatric methods, 3 meals daily,  beverages from meals and hitting 64-70oz of water daily.  2. Continue improved vitamin usage, we're looking to normalize PTH and stabilize D levels around 50-60 this spring/summer and will recheck anemia/ferritin and PTH levels in July ahead of our next visit.  3. Phentermine tolerated at half a tablet daily, continue using after breakfast, no later than lunch time to reduce risk of insomnia/over stimulation side effects.  4. Periodic weights will be helpful, averaging 1-2 lbs per week is a sign you're right on track.  5. Continue dietician visits and we'll check in at the clinic later this summer for review/progress check.  Return in about 3 months (around 8/11/2022) for Follow up.    Bariatric Surgery Review     Interim History/LifeChanges: phentermine has helped reset \"fullness feeling\". Meals have halved. Less eating out. No scale at home but looser clothes and facial thinning c/w weight loss.   Initial palpitations have resolved. Half tablet daily phentermine after breakfast. Uses it 2-3 days out of the week, due to stressors. Potty training.     Patient Concerns: follow up meds  Appetite (1-10): improving protein intake and meds have helped. Work days are her easy days. Using yogurt and granola to help a snack.   GERD: no.    Reviewed whether any need/indication for screening EGD today and we will n/a.  Typically, a screening EGD is recommend post op year 2-3 if " "no symptoms to assess health of esophagus/bariatric surgery and sooner if difficult to control GERD or persistent pain/dysphagia sx despite behavior modification.    Medication changes: tolerating phentermine. Improved vitamin compliance.    Vitamin Intake:   B-12   yes   MVI  double dose complete equivalent twice daiily.   Vitamin D  yes, 4000IUs   Calcium   yes, citrate 2x     Other  iron tapering to single dose.              LABS: \"Reviewed    Nausea no  Vomiting no  Constipation no  Diarrhea no  Rashes no  Hair Loss no  Reactive Hypoglycemia no  Light Headedness no   Moods no      Most recent labs:  Lab Results   Component Value Date    WBC 6.9 03/29/2022    HGB 10.9 (L) 03/29/2022    HCT 35.4 03/29/2022    MCV 75 (L) 03/29/2022     03/29/2022     Lab Results   Component Value Date    CHOL 190 08/17/2021     Lab Results   Component Value Date    HDL 53 08/17/2021     No components found for: LDLCALC  Lab Results   Component Value Date    TRIG 85 08/17/2021     No results found for: CHOLHDL  Lab Results   Component Value Date    ALT 11 03/29/2022    AST 16 03/29/2022    ALKPHOS 96 03/29/2022     No results found for: HGBA1C  No components found for: REDFIDDT84  No components found for: AZREXAMI96EN  No components found for: FERRITIN  No components found for: PTH  No components found for: 57724  No components found for: 7597  Lab Results   Component Value Date    TSH 4.57 03/29/2022     No results found for: TESTOSTERONE      Exercise Routine: walking some. Some struggle at home as this week they are potty training their 5 yo daughter.  3 meals/day? yes  Protein 60-80g/day? yes  Water Separate from meals? yes  Calorie Containing Beverages: no  Restaurant eating/wk: rare now, formerly ate lunch out daily.  Sleep Habits:  Na/  CPAP Use? n/a  Contraception: n/a  DEXA:n/a.  Discussed annual screening to start at age 45 and continue to age 55 if scoring \"low risk\". DEXA scan recommended at age 55 regardless as " long as at least 2 years have transpired from their bariatric surgery.    Social History     Social History     Socioeconomic History     Marital status:      Spouse name: Not on file     Number of children: Not on file     Years of education: Not on file     Highest education level: Not on file   Occupational History     Not on file   Tobacco Use     Smoking status: Never Smoker     Smokeless tobacco: Never Used   Substance and Sexual Activity     Alcohol use: Yes     Alcohol/week: 2.5 standard drinks     Comment: armen     Drug use: No     Sexual activity: Yes     Partners: Male     Birth control/protection: I.U.D., None   Other Topics Concern     Parent/sibling w/ CABG, MI or angioplasty before 65F 55M? No   Social History Narrative     Not on file     Social Determinants of Health     Financial Resource Strain: Not on file   Food Insecurity: Not on file   Transportation Needs: Not on file   Physical Activity: Not on file   Stress: Not on file   Social Connections: Not on file   Intimate Partner Violence: Not on file   Housing Stability: Not on file       Past Medical History     Past Medical History:   Diagnosis Date     Anisometropia      Anxiety      Cervical intraepithelial neoplasia III 2013     Depression     20s; on medications zoloft; prozac x 1 year     Depressive disorder      Dyslipidemia 2014     Hypothyroidism      Metabolic syndrome      Morbid obesity (H)      MTHFR mutation     heterozygous     PCOS (polycystic ovarian syndrome)      Polycystic ovary syndrome      Pre-diabetes      Prothrombin gene mutation (H) 01/17/2017    heterozygous     Strabismus      Problem List     Patient Active Problem List   Diagnosis     Knee pain     Morbid obesity (H)     Anisometropia     Anxiety     Hyperlipidemia with target LDL less than 160     Hyperparathyroidism (H)     Hypothyroidism     Iron deficiency anemia     Low vitamin B12 level     Metabolic syndrome     MTHFR mutation     PCOS (polycystic  ovarian syndrome)     Pre-diabetes     S/P bariatric surgery     Vitamin D deficiency     Morbid obesity with BMI of 50.0-59.9, adult (H)     ALFRED III (cervical intraepithelial neoplasia III)     Medications     [unfilled]  Surgical History     Past Surgical History  She has a past surgical history that includes leep tx, cervical (); Excise ganglion wrist (Right); Eye surgery; Pr Lap Gastric Bypass/Pavithra-En-Y (N/A, 07/15/2015); Biopsy cervical, local excision, single/multiple (); and  Section (N/A, 2018).    Objective-Exam     Constitutional:  There were no vitals taken for this visit.  [unfilled]   General:  Pleasant and in no acute distress   Eyes:  EOMI  ENT:  Airway patent    Neck:  Respiratory: Normal respiratory effort, no cough, .  CV:  n/a  Gastrointestinal:   Musculoskeletal: muscle mass WNL  Skin: color slightly odd complexion on video, may have been lighting related, no obvious pallor (hx of mild anemia on labs in March) hair wnl.,   Psychiatric: alert and oriented X3, mood and affect normal    Counseling     We reviewed the important post op bariatric recommendations:  -eating 3 meals daily  -eating protein first, getting >60gm protein daily  -eating slowly, chewing food well  -avoiding/limiting calorie containing beverages  -drinking water 15-30 minutes before or after meals  -limiting restaurant or cafeteria eating to twice a week or less    We discussed the importance of restorative sleep and stress management in maintaining a healthy weight.  We discussed the National Weight Control Registry healthy weight maintenance strategies and ways to optimize metabolism.  We discussed the importance of physical activity including cardiovascular and strength training in maintaining a healthier weight.    We discussed the importance of life-long vitamin supplementation and life-long  follow-up.    Leah was reminded that, to avoid marginal ulcers she should avoid tobacco at all,  alcohol in excess, caffeine in excess, and NSAIDS (unless indicated for cardioprotection or othewise and opposed by a PPI).    Manoj Stover MD    [facility].  2022  1:15 PM  [phone] (clinic phone)  [phone] (fax)    No images are attached to the encounter.  Medical Decision Makin minutes spent on the date of the encounter doing chart review, history and exam, documentation and further activities per the note      Video-Visit Details    Type of service:  Video Visit    Video End Time (time video stopped): 1:40 PM  Originating Location (pt. Location): Home    Distant Location (provider location):  [facility]     Platform used for Video Visit: [employer]

## 2022-05-11 NOTE — LETTER
5/11/2022         RE: Leah John  [address] [place] [address]        Dear Colleague,    Thank you for referring your patient, Leah John, to the [facility] [place]. Please see a copy of my visit note below.    Leah John is 41 year old  female who presents for a billable video visit today.    How would you like to obtain your AVS? MyChart  If dropped from the video visit, the video invitation should be resent by: Send to e-mail at: [email or web address]  Will anyone else be joining your video visit? No      Video Start Time: 1:15 PM    Are there any specific questions or needs that you would like addressed at your visit today? No    Provider Notes:   Bariatric Follow Up Visit with a History of Previous Bariatric Surgery     Date of visit: 5/11/2022  Physician: Manoj Stover MD, MD  Primary Care Provider:  Andra Olsen  Leah John   41 year old  female    Date of Surgery: 7/15/15  Initial Weight: 311 lbs  Initial BMI: 59.7  Today's Weight:   Wt Readings from Last 1 Encounters:   03/23/22 122.5 kg (270 lb)     Weight history:   Wt Readings from Last 4 Encounters:   03/23/22 122.5 kg (270 lb)   03/23/22 122.5 kg (270 lb)   09/29/21 124.3 kg (274 lb)   08/17/21 124.3 kg (274 lb)      There is no height or weight on file to calculate BMI.      Assessment and Plan     Assessment: Leah is a 41 year old year old female who is nearly 7 years s/p  Pavithra en Y Gastric Bypass with Dr. Olivier. At our visit in March this year, she started w/ phentermine therapy at 270 lbs and increased attention to her vitamin regimen which had been a bit suboptimal recently. The transient RUQ pain she called about after our March visit has resolved.  Subjectively, weight loss is occurring but she lacks a scale. No ill effects noted thus far from phentermine so we'll continue therapy until our in person visit early August and recheck PTH, anemia  "labs ahead of that visit..     Leah John feels as if she is getting better in  Achieving the goals she hoped to accomplish through bariatric surgery and weight loss.    Encounter Diagnoses   Name Primary?     Postoperative malabsorption Yes     Other iron deficiency anemia      Secondary hyperparathyroidism, non-renal (H)      Morbid obesity with BMI of 50.0-59.9, adult (H)          Current Outpatient Medications:      ferrous fumarate 65 mg, Jackson. FE,-Vitamin C 125 mg (VITRON-C)  MG TABS tablet, Use twice daily, 20minutes after meals for 30 days then reduce to once daily dosing thereafter for 2 months until gone., Disp: 120 tablet, Rfl: 0     phentermine (ADIPEX-P) 37.5 MG tablet, Start half tablet daily after breakfast., Disp: 45 tablet, Rfl: 0    Plan:  1. Continue mindful bariatric methods, 3 meals daily,  beverages from meals and hitting 64-70oz of water daily.  2. Continue improved vitamin usage, we're looking to normalize PTH and stabilize D levels around 50-60 this spring/summer and will recheck anemia/ferritin and PTH levels in July ahead of our next visit.  3. Phentermine tolerated at half a tablet daily, continue using after breakfast, no later than lunch time to reduce risk of insomnia/over stimulation side effects.  4. Periodic weights will be helpful, averaging 1-2 lbs per week is a sign you're right on track.  5. Continue dietician visits and we'll check in at the clinic later this summer for review/progress check.  Return in about 3 months (around 8/11/2022) for Follow up.    Bariatric Surgery Review     Interim History/LifeChanges: phentermine has helped reset \"fullness feeling\". Meals have halved. Less eating out. No scale at home but looser clothes and facial thinning c/w weight loss.   Initial palpitations have resolved. Half tablet daily phentermine after breakfast. Uses it 2-3 days out of the week, due to stressors. Potty training.     Patient Concerns: follow up " "meds  Appetite (1-10): improving protein intake and meds have helped. Work days are her easy days. Using yogurt and granola to help a snack.   GERD: no.    Reviewed whether any need/indication for screening EGD today and we will n/a.  Typically, a screening EGD is recommend post op year 2-3 if no symptoms to assess health of esophagus/bariatric surgery and sooner if difficult to control GERD or persistent pain/dysphagia sx despite behavior modification.    Medication changes: tolerating phentermine. Improved vitamin compliance.    Vitamin Intake:   B-12   yes   MVI  double dose complete equivalent twice daiily.   Vitamin D  yes, 4000IUs   Calcium   yes, citrate 2x     Other  iron tapering to single dose.              LABS: \"Reviewed    Nausea no  Vomiting no  Constipation no  Diarrhea no  Rashes no  Hair Loss no  Reactive Hypoglycemia no  Light Headedness no   Moods no      Most recent labs:  Lab Results   Component Value Date    WBC 6.9 03/29/2022    HGB 10.9 (L) 03/29/2022    HCT 35.4 03/29/2022    MCV 75 (L) 03/29/2022     03/29/2022     Lab Results   Component Value Date    CHOL 190 08/17/2021     Lab Results   Component Value Date    HDL 53 08/17/2021     No components found for: LDLCALC  Lab Results   Component Value Date    TRIG 85 08/17/2021     No results found for: CHOLHDL  Lab Results   Component Value Date    ALT 11 03/29/2022    AST 16 03/29/2022    ALKPHOS 96 03/29/2022     No results found for: HGBA1C  No components found for: OBUWSLSN49  No components found for: SONIQYVH29EA  No components found for: FERRITIN  No components found for: PTH  No components found for: 74499  No components found for: 7597  Lab Results   Component Value Date    TSH 4.57 03/29/2022     No results found for: TESTOSTERONE      Exercise Routine: walking some. Some struggle at home as this week they are potty training their 3 yo daughter.  3 meals/day? yes  Protein 60-80g/day? yes  Water Separate from meals? yes  Calorie " "Containing Beverages: no  Restaurant eating/wk: rare now, formerly ate lunch out daily.  Sleep Habits:  Na/  CPAP Use? n/a  Contraception: n/a  DEXA:n/a.  Discussed annual screening to start at age 45 and continue to age 55 if scoring \"low risk\". DEXA scan recommended at age 55 regardless as long as at least 2 years have transpired from their bariatric surgery.    Social History     Social History     Socioeconomic History     Marital status:      Spouse name: Not on file     Number of children: Not on file     Years of education: Not on file     Highest education level: Not on file   Occupational History     Not on file   Tobacco Use     Smoking status: Never Smoker     Smokeless tobacco: Never Used   Substance and Sexual Activity     Alcohol use: Yes     Alcohol/week: 2.5 standard drinks     Comment: armen     Drug use: No     Sexual activity: Yes     Partners: Male     Birth control/protection: I.U.D., None   Other Topics Concern     Parent/sibling w/ CABG, MI or angioplasty before 65F 55M? No   Social History Narrative     Not on file     Social Determinants of Health     Financial Resource Strain: Not on file   Food Insecurity: Not on file   Transportation Needs: Not on file   Physical Activity: Not on file   Stress: Not on file   Social Connections: Not on file   Intimate Partner Violence: Not on file   Housing Stability: Not on file       Past Medical History     Past Medical History:   Diagnosis Date     Anisometropia      Anxiety      Cervical intraepithelial neoplasia III 2013     Depression     20s; on medications zoloft; prozac x 1 year     Depressive disorder      Dyslipidemia 2014     Hypothyroidism      Metabolic syndrome      Morbid obesity (H)      MTHFR mutation     heterozygous     PCOS (polycystic ovarian syndrome)      Polycystic ovary syndrome      Pre-diabetes      Prothrombin gene mutation (H) 01/17/2017    heterozygous     Strabismus      Problem List     Patient Active Problem List "   Diagnosis     Knee pain     Morbid obesity (H)     Anisometropia     Anxiety     Hyperlipidemia with target LDL less than 160     Hyperparathyroidism (H)     Hypothyroidism     Iron deficiency anemia     Low vitamin B12 level     Metabolic syndrome     MTHFR mutation     PCOS (polycystic ovarian syndrome)     Pre-diabetes     S/P bariatric surgery     Vitamin D deficiency     Morbid obesity with BMI of 50.0-59.9, adult (H)     ALFRED III (cervical intraepithelial neoplasia III)     Medications     [unfilled]  Surgical History     Past Surgical History  She has a past surgical history that includes leep tx, cervical (2013); Excise ganglion wrist (Right); Eye surgery; Pr Lap Gastric Bypass/Pavithra-En-Y (N/A, 07/15/2015); Biopsy cervical, local excision, single/multiple (); and  Section (N/A, 2018).    Objective-Exam     Constitutional:  There were no vitals taken for this visit.  [unfilled]   General:  Pleasant and in no acute distress   Eyes:  EOMI  ENT:  Airway patent    Neck:  Respiratory: Normal respiratory effort, no cough, .  CV:  n/a  Gastrointestinal:   Musculoskeletal: muscle mass WNL  Skin: color slightly odd complexion on video, may have been lighting related, no obvious pallor (hx of mild anemia on labs in March) hair wnl.,   Psychiatric: alert and oriented X3, mood and affect normal    Counseling     We reviewed the important post op bariatric recommendations:  -eating 3 meals daily  -eating protein first, getting >60gm protein daily  -eating slowly, chewing food well  -avoiding/limiting calorie containing beverages  -drinking water 15-30 minutes before or after meals  -limiting restaurant or cafeteria eating to twice a week or less    We discussed the importance of restorative sleep and stress management in maintaining a healthy weight.  We discussed the National Weight Control Registry healthy weight maintenance strategies and ways to optimize metabolism.  We discussed the  importance of physical activity including cardiovascular and strength training in maintaining a healthier weight.    We discussed the importance of life-long vitamin supplementation and life-long  follow-up.    Leah was reminded that, to avoid marginal ulcers she should avoid tobacco at all, alcohol in excess, caffeine in excess, and NSAIDS (unless indicated for cardioprotection or othewise and opposed by a PPI).    Manoj Stover MD    [facility].  2022  1:15 PM  [phone] (clinic phone)  [phone] (fax)    No images are attached to the encounter.  Medical Decision Makin minutes spent on the date of the encounter doing chart review, history and exam, documentation and further activities per the note      Video-Visit Details    Type of service:  Video Visit    Video End Time (time video stopped): 1:40 PM  Originating Location (pt. Location): Home    Distant Location (provider location):  [facility]     Platform used for Video Visit: Well      Again, thank you for allowing me to participate in the care of your patient.        Sincerely,        Manoj Stover MD

## 2022-05-11 NOTE — PATIENT INSTRUCTIONS
Plan:  1. Continue mindful bariatric methods, 3 meals daily,  beverages from meals and hitting 64-70oz of water daily.  2. Continue improved vitamin usage, we're looking to normalize PTH and stabilize D levels around 50-60 this spring/summer and will recheck anemia/ferritin and PTH levels in July ahead of our next visit.  3. Phentermine tolerated at half a tablet daily, continue using after breakfast, no later than lunch time to reduce risk of insomnia/over stimulation side effects.  4. Periodic weights will be helpful, averaging 1-2 lbs per week is a sign you're right on track.  5. Continue dietician visits and we'll check in at the clinic later this summer for review/progress check.        LEAN PROTEIN SOURCES  Getting 20-30 grams of protein, 3 meals daily, is appropriate for most people, some need more but more than about 40 grams per meal is not useful.  General rule is drinking one ounce of water per gram of protein eaten over the course of the day:  70 grams of protein each day, drink 70 oz of water.  Protein Source Portion Calories Grams of Protein                           Nonfat, plain Greek yogurt    (10 grams sugar or less) 3/4 cup (6 oz)  12-17   Light Yogurt (10 grams sugar or less) 3/4 cup (6 oz)  6-8   Protein Shake 1 shake 110-180 15-30   Skim/1% Milk or lactose-free milk 1 cup ( 8 oz)  8   Plain or light, flavored soymilk 1 cup  7-8   Plain or light, hemp milk 1 cup 110 6   Fat Free or 1% Cottage Cheese 1/2 cup 90 15   Part skim ricotta cheese 1/2 cup 100 14   Part skim or reduced fat cheese slices 1 ounce 65-80 8     Mozzarella String Cheese 1 80 8   Canned tuna, chicken, crab or salmon  (canned in water)  1/2 cup 100 15-20   White fish (broiled, grilled, baked) 3 ounces 100 21   [place]/Tuna (broiled, grilled, baked) 3 ounces 150-180 21   Shrimp, Scallops, Lobster, Crab 3 ounces 100 21   Pork loin, Pork Tenderloin 3 ounces 150 21   Boneless, skinless chicken  /turkey breast                          (broiled, grilled, baked) 3 ounces 120 21   [place], [place], [place], and Venison 3 ounces 120 21   Lean cuts of red meat and pork (sirloin,   round, tenderloin, flank, ground 93%-96%) 3 ounces 170 21   Lean or Extra Lean Ground Turkey 1/2 cup 150 20   90-95% Lean [place] Burger 1 david 140-180 21   Low-fat casserole with lean meat 3/4 cup 200 17   Luncheon Meats                                                        (turkey, lean ham, roast beef, chicken) 3 ounces 100 21   Egg (boiled, poached, scrambled) 1 Egg 60 7   Egg Substitute 1/2 cup 70 10   Nuts (limit to 1 serving per day)  3 Tbsp. 150 7   Nut [place] (peanut, almond)  Limit to 1 serving or less daily 1 Tbsp. 90 4   Soy Burger (varies) 1  15   Garbanzo, Black, [place] Beans 1/2 cup 110 7   Refried Beans 1/2 cup 100 7   Kidney and Lima beans 1/2 cup 110 7   Tempeh 3 oz 175 18   Vegan crumbles 1/2 cup 100 14   Tofu 1/2 cup 110 14   Chili (beans and extra lean beef or turkey) 1 cup 200 23   Lentil Stew/Soup 1 cup 150 12   Black Bean Soup 1 cup 175 12           [facility]  Nutritional Guidelines  Gastric Bypass 18 Months Post Op and Beyond    General Guidelines and Helpful Hints:  Eat 3 meals per day + protein supplement(s). No snacks between meals.  Do not skip meals.  This can cause overeating at the next meal and will prevent adequate protein and nutritional intake.  Aim for 60-80 grams of protein per day.  Always eat your protein first. This assists with optimal nutrition and helps you stay full longer.  Depending on your portion size, you may need to drink approved protein supplement between meals to achieve protein goals. Follow recommendations of your Dietitian.   Eat your protein first, and then follow with fiber.   It is not necessary to count your fiber, but 15-20 grams per day is recommended.    Add fiber by including fruits, vegetables, whole grains, and beans.   Portions should remain about 1  "cup per meal. Use measuring cups to be accurate.  Continue to use saucer/salad plates, infant/toddler silverware to keep portion sizes small and take small bites.  Eat S-L-O-W-L-Y to make each meal last 20-30 minutes. Always stop eating when satisfied.  Continue to use caution with foods containing skins, peels or membranes. Chew well!  Aim for 64 oz. of calorie-free fluids daily.  Continue to avoid caffeine and carbonation. If you choose to drink alcohol, do so in moderation.   Remember to avoid drinking during meals, 15-30 minutes before and 30 minutes after.  Exercise is benavidez for continued weight loss and weight maintenance. 150 minutes weekly of moderate aerobic activity or 75 minutes of vigorous with 2 days or more a week of strength training. Try to get 20% or more of your steps each day at a brisk pace, as though hurrying to a bus stop. Look to get stronger this year.  If having trouble tolerating meat, try using a crock-pot, tinfoil tent, steamer or other moist cooking method to create tender meats. Add broth or low-fat gravy to help meat stay moist.   Avoid high sugar and high fat foods to prevent dumping syndrome.  Check nutrition labels for less than 10 grams of sugar and less than 10 grams of fat per serving.  Continue Taking Vitamins/Minerals:  1000 mcg of Sublingual B-12 at least 3 days weekly to average 350-500mcg/day. If using 2500mcg lozenges, 2 weekly.  If 5000 mcg, once weekly dosing works.  1 Complete Multivitamin with 18mg Iron twice daily (chewable or swallow tabs). Often sold as \"women's one a day\" if tablet but take twice daily.  500-600 mg Calcium Citrate twice daily (chewable or swallow tabs).  5000 IU Vitamin D3 daily.  If menstruating, you may need closer to 60mg of iron daily to prevent iron deficiency. An occasional \"boost\" of extra iron supplement during/after is reasonable if heavy flow.    Sample Grocery List    Protein:  Fat free Greek or light yogurt (less than 10 grams sugar)  Fat " free or low-fat cottage cheese  String cheese or reduced fat cheese slices  Tuna, salmon, crab, egg, or chicken salad made with light or fat free mayonnaise  Egg or Egg Substitute  Lean/extra lean turkey, beef, bison, venison (ground, sirloin, round, flank)  Pork loin or tenderloin (grilled, baked, broiled)  Fish such as salmon, tuna, trout, tilapia, etc. (grilled, baked, broiled)  Tender cuts of lean (skinless) turkey or chicken  Lean deli meats: turkey, lean ham, chicken, lean roast beef  Beans such as kidney, garbanzo, black, mendez, or low-fat/fat free refried beans  Peanut butter (natural preferred). Limit to 1 Tbsp. per day.  Low-fat meatloaf (made with lean ground beef or turkey)  Sloppy Joes made with low-sugar ketchup and lean ground beef or turkey  Soy or vegetable protein (i.e. vegan crumbles, soy/veggie burger, tofu)  Hummus    Vegetables:  Fresh: cooked or raw (as tolerated)  Frozen vegetables  Canned vegetables (low sodium or no salt added, rinse before cooking/eating)  (Ok to have skins/peels/membranes/seeds - just chew well)    Fruits:  Fresh fruit  Frozen fruit (no sugar added)  Canned fruit (packed in its own juice, NOT syrup)  (Ok to have skins/peels/membranes/seeds - just chew well)    Starch:  Unsweetened whole-grain hot cereal (or high fiber cold cereal, dry)  Toasted whole wheat bread or [place] Thins  Whole grain crackers  Baked /boiled/mashed potato/sweet potato  Cooked whole grain pasta, brown rice, or other cooked whole grains  Starchy vegetables: corn, peas, winter squash    Protein Supplement:   Ready to drink protein shake with:  15-30 grams protein per serving  Less than 10 grams total carbohydrate per serving   Protein powder mixed with:   Skim or 1% milk  Low fat or fat free Lactaid milk, plain or no sugar added soymilk  Water     Fats: (use in moderation)  1 teaspoon of soft tub margarine  1 teaspoon olive oil, canola oil, or peanut oil  1 tablespoon of low-fat mccormick or salad  dressing     Sample Menu for 18+ months after Gastric Bypass    You do NOT need to eat/drink the full portion sizes listed below  Always stop when you are satisfied    Breakfast   cup 1% cottage cheese     cup mixed berries   Lunch 2 oz lean roast beef on   [place] Thin with 1 tsp. light mccormick    small tomato, chopped, mixed with 1 tsp. light vinaigrette dressing   Supplement Approved protein supplement (if needed between meals)   Dinner 2 oz grilled salmon    cup salad greens with 1 tsp. light salad dressing and 1 tsp. ground flax seed    cup quinoa or brown rice     Breakfast   cup egg substitute with   cup sautéed chopped vegetables  2 light [place] Krisp crackers   Lunch Tuna Melt:   cup tuna mixed with 1 tsp. light mccormick over   [place] Thin. Top with 2-3 slices cucumber and 1 oz slice of low fat cheese   Supplement 1 cup skim milk (if needed between meals)   Dinner 3 oz  grilled, broiled, or baked seasoned skinless chicken breast    cup asparagus     Breakfast   cup plain oatmeal made with skim or 1% milk with 1 Tbsp. flavored/unflavored protein powder added  1 mozzarella string cheese   Lunch 2 oz deli turkey breast  1/3 cup salad with 1 tsp. light salad dressing, 1/8 of a whole avocado and 1 Tbsp. sunflower seeds   Dinner 3 oz. pork loin made in a crock pot, seasoned with a spice rub    cup cooked carrots   Supplement Approved protein supplement (if needed between meals)     Breakfast 1 cup breakfast casserole made with egg substitute, turkey sausage,  and steamed, chopped bell peppers   Supplement  1 cup light Greek yogurt (if needed between meals)   Lunch 2 oz. teriyaki turkey    cup mashed sweet potato with 1-2 spritzes of spray butter    cup fresh pineapple   Dinner 3 oz low fat meatloaf    cup roasted garlic zucchini     Breakfast   cup leftover breakfast casserole    cup no sugar added applesauce with 1 Tbsp. unflavored protein powder and a sprinkle of cinnamon    Lunch 3 oz shrimp with 1-2 Tbsp. low-sugar  cocktail sauce for dipping    c. whole wheat pasta drizzled with   tsp. olive oil   Supplement 1 cup skim/1% milk with scoop of protein powder (if needed between meals)   Dinner Grilled, seasoned kebob with 2 oz lean beef and   cup vegetables     Breakfast Breakfast pizza:   [place] Thin spread with 1 Tbsp. low sugar spaghetti sauce,   cup shredded low fat cheese, melted and 1 slice of [place] varma     cup fresh fruit mixed with chopped almonds   Lunch   cup black bean soup  4-5 whole grain crackers   Dinner 3 oz  tilapia with lemon pepper seasoning    cup stewed tomatoes   Supplement 1 string cheese (if needed between meals)     Breakfast 2 hard boiled eggs (discard 1 egg yolk)    whole wheat English Muffin with 1 tsp. low sugar jelly   Lunch   cup leftover black bean soup topped with 1-2 Tbsp. low fat cheese  2-3 light [place] Krisp crackers   Supplement Approved protein supplement (if needed between meals)   Dinner 3 oz sirloin steak    cup steamed broccoli    Information about the Weight Loss Medication Phentermine    When combined with mindful eating and behavior changes, weight loss medications can be a nice additional tool to maximize your weight loss season.  There are no magic pills and without diet and behavior changes, weight loss will be minimal.  Think of this medication as a tool to make your diet and behavior changes easier and you'll enjoy a higher probability of success.  Remember not to skip meals, but use this medication to tolerate your reduced calories more easily.  If you are very hungry in the evenings, you are likely not eating enough in the first 10 hours of your day and need to focus on getting your protein requirements in at each of your 3 daily meals.      Phentermine is a stimulant medication related to the amphetamine class of medication but with a lower risk of dependence and addiction.  It is used for weight loss by suppressing the appetite region of the brain.  It also may speed up  "the metabolic rate and give a person more energy.  Like any medication there are potential side effects and the most common are:  Dry mouth occurs in almost everyone (hydrate well), fewer people experience Palpatations, fast heart rate, elevation of blood pressure, restlessness, insomnia, dizziness, change in mood, tremor, headache, changes in bowel movements,itchiness, changes in sex drive.  If you are or may have become pregnant, do not use phentermine as it increases the risk for birth defects/miscarriage.  Do not use if breastfeeding.    Some people can develop serious side effects which include:  Heart strain (\"ischemia\").  Tachycardia (fast heart rate or irregular heart rate).  Hypertension  Pulmonary Hypertension  Psychosis  Dependency and abuse has occurred in some.  If you've been on high dose (37.5mg) for long periods, phentermine should be tapered down over a few weeks before abruptly stopping as seizures have been reported rarely.    We do not recommend taking it in combination with the following medications due to potential drug interactions which can increase the risk of side effects and/or potential for seizures:    Absolutely contraindicated are:  Amphetamines or other stimulants like ADHD medication: (dextroamphetamine, amphetamine, diethylpropion, isocarboxazid, methamphetamine, lisdexamfetamine, benzphetamine,dexmethylphenidate, methylphenidate, selegiline patch, sibutramine, tranylcypromine.    Avoid use with:   Dopamine, dobutamine, ephedra, ephedrine, epinephrine, isoproterenol, linezolid, norepinephrine, phenylephrine injection, venlafaxine (Effexor).    Monitor or modify dose with:  Acebutolol, atenolol, betaxolol, bisoprolol, carvedilol, droxidopa, esmolol, labetalol, magnesium citrate, metoprolol, nadolol, nebivolol, penbutolol, pindolol, propranolol, sotalol, timolol.    Caution with: armodafinil,betaxolol eye drops, brexpiprazole, bupropion, busulfan, caffeine, carteolol drops, " enzalutaminde, ginseng, green tea, guarana, levobunolol drops, lindane cream, modafinil, afrin nasal spray (oxymetazoline), pamabrom, phenylephrine oral and nasal spray, pseudoephedrine (sudafed), rasgiline, sleegiline, bowel prep, tiagabine, timolol drops,  TRAMADOL due to increased risk of seizures.    The current cheapest place to fill your prescription is at [facility], [facility], [facility] or [place] pharmacy,[facility] or [employer] and is around $22for 90 tablets.  Occasionally, [employer] and [employer] have price matched, so call around and get the best price for you.  Other pharmacies may charge closer to$70- $100 for the same prescription. You don't have to be a member to use the pharmacy at [facility] currently.  An alternative some patients have tried is using a voucher system through "[employer]".  $25 paid on their website gets you a  voucher that can allows you to pick your meds up at [facility] without paying anything more.  [employer] may also offer discounted coupons and give prices around you.    Dosing:  We start with half a tablet for the first 2-3 weeks and if tolerating it without problems, you can take up to one full tablet daily in the morning after breakfast.  For those with evening hunger problems, sometimes half a tablet in the morning and half a tablet around 1 pm can be effective, however, risks of nighttime insomnia/restless increase with afternoon dosing so call me at the clinic if considering this regimen or having any issues.  You only have to use the amount effective for you, not to exceed one full tablet.  It can also be used situationaly and does not have to be taken every day. For more sensitive individuals,: get a pill cutter and cut the half tab into quarters and use a quarter of a tablet, about 9mg, for a couple weeks before increasing to half a tablet (18.75mg) if needed.  As always, if any questions give us a call at the [facility] telephone:  [phone].     Don't use  "Phentermine if sick/ill or using other stimulants/cold medications and it's OK to skip days that you don't feel the need for appetite suppressant assistance.  This medication works the day you take it and doesn't require \"building up\" in the system.    "

## 2022-06-10 ENCOUNTER — HOSPITAL ENCOUNTER (OUTPATIENT)
Dept: GENERAL RADIOLOGY | Facility: HOSPITAL | Age: 42
Discharge: HOME | End: 2022-06-10
Attending: PODIATRIST | Admitting: PODIATRIST
Payer: COMMERCIAL

## 2022-06-10 ENCOUNTER — OFFICE VISIT (OUTPATIENT)
Dept: PODIATRY | Facility: CLINIC | Age: 42
End: 2022-06-10
Payer: COMMERCIAL

## 2022-06-10 VITALS — HEART RATE: 90 BPM | BODY MASS INDEX: 53.01 KG/M2 | HEIGHT: 60 IN | WEIGHT: 270 LBS | OXYGEN SATURATION: 98 %

## 2022-06-10 DIAGNOSIS — M84.374A STRESS FRACTURE OF RIGHT FOOT, INITIAL ENCOUNTER: ICD-10-CM

## 2022-06-10 DIAGNOSIS — M72.2 PLANTAR FASCIITIS: ICD-10-CM

## 2022-06-10 DIAGNOSIS — M72.2 PLANTAR FASCIITIS: Primary | ICD-10-CM

## 2022-06-10 PROCEDURE — 73630 X-RAY EXAM OF FOOT: CPT | Mod: 26 | Performed by: PODIATRIST

## 2022-06-10 PROCEDURE — 73630 X-RAY EXAM OF FOOT: CPT | Mod: RT,FY

## 2022-06-10 PROCEDURE — 99213 OFFICE O/P EST LOW 20 MIN: CPT | Performed by: PODIATRIST

## 2022-06-10 RX ORDER — SODIUM CHLORIDE 9 MG/ML
25 INJECTION, SOLUTION INTRAVENOUS
COMMUNITY
Start: 2022-03-23 | End: 2022-08-01

## 2022-06-10 ASSESSMENT — PAIN SCALES - GENERAL: PAINLEVEL: MODERATE PAIN (4)

## 2022-06-10 NOTE — PROGRESS NOTES
FOOT AND ANKLE SURGERY/PODIATRY Progress Note        ASSESSMENT:   Plantar Fasciitis  Gastrosoleus Equinus   Stress Fracture 2nd metatarsal right foot      TREATMENT:  -There is mild pain along proximal 2nd metatarsal shaft right foot. Mild pain along plantar fascia right.     -We discussed treatment options to include stretching exercises, anti-inflammatory medication, orthotics, steroid injections, physical therapy and a night splint. She is unable to tolerate NSAID's due to gastric bypass surgery. Will consider oral steroids and CAM boot.     -All questions invited and answered. I have referred her for x-rays and MRI of the right foot.     -I will contact the patient with the MRI report when available and we will be guided by the results.     Theo Raines DPM  [facility]/Foot & Ankle Surgery      HPI: Patient returns to see me today for right foot pain. She reports having pain along the plantar right arch and top of the right foot for several months. She denies trauma and has used the orthotic previously prescribed.     Past Medical History:   Diagnosis Date     Anisometropia      Anxiety      Cervical intraepithelial neoplasia III      Depression     20s; on medications zoloft; prozac x 1 year     Depressive disorder      Dyslipidemia      Hypothyroidism      Metabolic syndrome      Morbid obesity (H)      MTHFR mutation     heterozygous     PCOS (polycystic ovarian syndrome)      Polycystic ovary syndrome      Pre-diabetes      Prothrombin gene mutation (H) 2017    heterozygous     Strabismus        Past Surgical History:   Procedure Laterality Date     BIOPSY CERVICAL, LOCAL EXCISION, SINGLE/MULTIPLE      ALFRED III      SECTION N/A 2018    Procedure: PRIMARY  SECTION;  Surgeon: Becky Rg MD;  Location: [facility];  Service:      EXCISE GANGLION WRIST Right      EYE SURGERY      x3; as a child     LEEP TX, CERVICAL       GA LAP GASTRIC  BYPASS/CRISTIANE-EN-Y N/A 07/15/2015    Mark Olivier MD; [place]. Initial Weight 311 lbs, BMI 59.7       Allergies   Allergen Reactions     Nsaids      Hx of gastric bypass and should avoid due to ulcer/gastritis risks.     Sulfa Drugs Unknown         Current Outpatient Medications:      ferrous fumarate 65 mg, [place]. FE,-Vitamin C 125 mg (VITRON-C)  MG TABS tablet, Use twice daily, 20minutes after meals for 30 days then reduce to once daily dosing thereafter for 2 months until gone., Disp: 120 tablet, Rfl: 0     phentermine (ADIPEX-P) 37.5 MG tablet, Start half tablet daily after breakfast., Disp: 45 tablet, Rfl: 0     sodium chloride 0.9% infusion, Inject 25 mL/hr into the vein, Disp: , Rfl:     Family History   Problem Relation Age of Onset     Obesity Mother      Hypertension Mother      Substance Abuse Mother      Depression Father      Hypertension Father      Substance Abuse Father      Depression Sister      Anxiety Disorder Sister      Depression Brother      Diabetes Brother         insulin     Depression Maternal Grandmother      Substance Abuse Maternal Grandmother      Depression Maternal Grandfather      Depression Paternal Grandmother      Depression Paternal Grandfather      Cancer Paternal Grandfather         found late, mets, possible lung     Substance Abuse Paternal Grandfather      Depression Sister      Anesthesia Reaction No family hx of        Social History     Socioeconomic History     Marital status:      Spouse name: Not on file     Number of children: Not on file     Years of education: Not on file     Highest education level: Not on file   Occupational History     Not on file   Tobacco Use     Smoking status: Never Smoker     Smokeless tobacco: Never Used   Substance and Sexual Activity     Alcohol use: Yes     Alcohol/week: 2.5 standard drinks     Comment: armen     Drug use: No     Sexual activity: Yes     Partners: Male     Birth control/protection: I.U.D., None   Other  Topics Concern     Parent/sibling w/ CABG, MI or angioplasty before 65F 55M? No   Social History Narrative     Not on file     Social Determinants of Health     Financial Resource Strain: Not on file   Food Insecurity: Not on file   Transportation Needs: Not on file   Physical Activity: Not on file   Stress: Not on file   Social Connections: Not on file   Intimate Partner Violence: Not on file   Housing Stability: Not on file       Review of Systems - 10 point Review of Systems is negative except for right foot pain which is noted in HPI.    OBJECTIVE:  Appearance: alert, well appearing, and in no distress.    General appearance: Patient is alert and fully cooperative with history & exam.  No sign of distress is noted during the visit.     Psychiatric: Affect is pleasant & appropriate.  Patient appears motivated to improve health.     Respiratory: Breathing is regular & unlabored while sitting.     HEENT: Hearing is intact to spoken word.  Speech is clear.  No gross evidence of visual impairment that would impact ambulation.    Vascular: Dorsalis pedis and posterior tibial pulses are palpable. There is pedal hair growth right.  CFT < 3 sec from anterior tibial surface to distal digits right. There is no appreciable edema noted.  Dermatologic: Turgor and texture are within normal limits. No coloration or temperature changes. No primary or secondary lesions noted.  Neurologic: All epicritic and proprioceptive sensations are grossly intact right.  Musculoskeletal: Mild pain along proximal 2nd metatarsal shaft right foot. Mild pain along plantar fascia right.

## 2022-06-10 NOTE — PATIENT INSTRUCTIONS
YOUR MRI IS SCHEDULED FOR 6/21/22 @ 845PM      [address] [address]  Phone: [phone]      We will call you with results/next steps

## 2022-06-10 NOTE — LETTER
6/10/2022         RE: Leah John  [address] [place] [address]        Dear Colleague,    Thank you for referring your patient, Leah John, to the [facility]. Please see a copy of my visit note below.    FOOT AND ANKLE SURGERY/PODIATRY Progress Note        ASSESSMENT:   Plantar Fasciitis  Gastrosoleus Equinus   Stress Fracture 2nd metatarsal right foot      TREATMENT:  -There is mild pain along proximal 2nd metatarsal shaft right foot. Mild pain along plantar fascia right.     -We discussed treatment options to include stretching exercises, anti-inflammatory medication, orthotics, steroid injections, physical therapy and a night splint. She is unable to tolerate NSAID's due to gastric bypass surgery. Will consider oral steroids and CAM boot.     -All questions invited and answered. I have referred her for x-rays and MRI of the right foot.     -I will contact the patient with the MRI report when available and we will be guided by the results.     Theo Raines DPM  [facility] Podiatry/Foot & Ankle Surgery      HPI: Patient returns to see me today for right foot pain. She reports having pain along the plantar right arch and top of the right foot for several months. She denies trauma and has used the orthotic previously prescribed.     Past Medical History:   Diagnosis Date     Anisometropia      Anxiety      Cervical intraepithelial neoplasia III 2013     Depression     20s; on medications zoloft; prozac x 1 year     Depressive disorder      Dyslipidemia 2014     Hypothyroidism      Metabolic syndrome      Morbid obesity (H)      MTHFR mutation     heterozygous     PCOS (polycystic ovarian syndrome)      Polycystic ovary syndrome      Pre-diabetes      Prothrombin gene mutation (H) 01/17/2017    heterozygous     Strabismus        Past Surgical History:   Procedure Laterality Date     BIOPSY CERVICAL, LOCAL EXCISION, SINGLE/MULTIPLE  2013    ALFRED III       SECTION N/A 2018    Procedure: PRIMARY  SECTION;  Surgeon: Becky Rg MD;  Location: [facility] L+D OR;  Service:      EXCISE GANGLION WRIST Right      EYE SURGERY      x3; as a child     LEEP TX, CERVICAL  2013     NC LAP GASTRIC BYPASS/CRISTIANE-EN-Y N/A 07/15/2015    Mark Olivier MD; [facility]. Initial Weight 311 lbs, BMI 59.7       Allergies   Allergen Reactions     Nsaids      Hx of gastric bypass and should avoid due to ulcer/gastritis risks.     Sulfa Drugs Unknown         Current Outpatient Medications:      ferrous fumarate 65 mg, [place]. FE,-Vitamin C 125 mg (VITRON-C)  MG TABS tablet, Use twice daily, 20minutes after meals for 30 days then reduce to once daily dosing thereafter for 2 months until gone., Disp: 120 tablet, Rfl: 0     phentermine (ADIPEX-P) 37.5 MG tablet, Start half tablet daily after breakfast., Disp: 45 tablet, Rfl: 0     sodium chloride 0.9% infusion, Inject 25 mL/hr into the vein, Disp: , Rfl:     Family History   Problem Relation Age of Onset     Obesity Mother      Hypertension Mother      Substance Abuse Mother      Depression Father      Hypertension Father      Substance Abuse Father      Depression Sister      Anxiety Disorder Sister      Depression Brother      Diabetes Brother         insulin     Depression Maternal Grandmother      Substance Abuse Maternal Grandmother      Depression Maternal Grandfather      Depression Paternal Grandmother      Depression Paternal Grandfather      Cancer Paternal Grandfather         found late, mets, possible lung     Substance Abuse Paternal Grandfather      Depression Sister      Anesthesia Reaction No family hx of        Social History     Socioeconomic History     Marital status:      Spouse name: Not on file     Number of children: Not on file     Years of education: Not on file     Highest education level: Not on file   Occupational History     Not on file   Tobacco Use     Smoking status: Never  Smoker     Smokeless tobacco: Never Used   Substance and Sexual Activity     Alcohol use: Yes     Alcohol/week: 2.5 standard drinks     Comment: armen     Drug use: No     Sexual activity: Yes     Partners: Male     Birth control/protection: I.U.D., None   Other Topics Concern     Parent/sibling w/ CABG, MI or angioplasty before 65F 55M? No   Social History Narrative     Not on file     Social Determinants of Health     Financial Resource Strain: Not on file   Food Insecurity: Not on file   Transportation Needs: Not on file   Physical Activity: Not on file   Stress: Not on file   Social Connections: Not on file   Intimate Partner Violence: Not on file   Housing Stability: Not on file       Review of Systems - 10 point Review of Systems is negative except for right foot pain which is noted in HPI.    OBJECTIVE:  Appearance: alert, well appearing, and in no distress.    General appearance: Patient is alert and fully cooperative with history & exam.  No sign of distress is noted during the visit.     Psychiatric: Affect is pleasant & appropriate.  Patient appears motivated to improve health.     Respiratory: Breathing is regular & unlabored while sitting.     HEENT: Hearing is intact to spoken word.  Speech is clear.  No gross evidence of visual impairment that would impact ambulation.    Vascular: Dorsalis pedis and posterior tibial pulses are palpable. There is pedal hair growth right.  CFT < 3 sec from anterior tibial surface to distal digits right. There is no appreciable edema noted.  Dermatologic: Turgor and texture are within normal limits. No coloration or temperature changes. No primary or secondary lesions noted.  Neurologic: All epicritic and proprioceptive sensations are grossly intact right.  Musculoskeletal: Mild pain along proximal 2nd metatarsal shaft right foot. Mild pain along plantar fascia right.           Again, thank you for allowing me to participate in the care of your patient.         Sincerely,        Theo Raines DPM

## 2022-06-21 ENCOUNTER — HOSPITAL ENCOUNTER (OUTPATIENT)
Dept: MRI IMAGING | Facility: HOSPITAL | Age: 42
Discharge: HOME | End: 2022-06-21
Attending: PODIATRIST | Admitting: PODIATRIST
Payer: COMMERCIAL

## 2022-06-21 DIAGNOSIS — M84.374A STRESS FRACTURE OF RIGHT FOOT, INITIAL ENCOUNTER: ICD-10-CM

## 2022-06-21 DIAGNOSIS — M72.2 PLANTAR FASCIITIS: ICD-10-CM

## 2022-06-21 PROCEDURE — 73718 MRI LOWER EXTREMITY W/O DYE: CPT | Mod: RT

## 2022-06-22 ENCOUNTER — TELEPHONE (OUTPATIENT)
Dept: VASCULAR SURGERY | Facility: CLINIC | Age: 42
End: 2022-06-22

## 2022-06-22 NOTE — TELEPHONE ENCOUNTER
I reviewed the right foot MRI with the patient today: 1.  Stress reaction within the second metatarsal. No evidence for displaced fracture.  2.  Edema within the medial sesamoid adjacent to the first metatarsal head. Sesamoiditis could have this appearance.  3.  Degenerative change first MTP joint with tiny effusion.  4.  Degenerative change at the second TMT joint.  5.  No evidence for tendinous or ligamentous pathology.    Based on the above, I recommend limited walking in a CAM boot x6 weeks which she recently ordered on [employer]. Then resume use of regular shoes. I have asked her to follow-up with me if symptoms progress beyond the 6 weeks.

## 2022-06-29 ENCOUNTER — VIRTUAL VISIT (OUTPATIENT)
Dept: SURGERY | Facility: CLINIC | Age: 42
End: 2022-06-29
Payer: COMMERCIAL

## 2022-06-29 VITALS — BODY MASS INDEX: 51.67 KG/M2 | WEIGHT: 265 LBS

## 2022-06-29 DIAGNOSIS — E66.01 MORBID OBESITY WITH BMI OF 50.0-59.9, ADULT (H): ICD-10-CM

## 2022-06-29 DIAGNOSIS — Z98.84 BARIATRIC SURGERY STATUS: Primary | ICD-10-CM

## 2022-06-29 PROCEDURE — 97803 MED NUTRITION INDIV SUBSEQ: CPT | Performed by: DIETITIAN, REGISTERED

## 2022-06-29 NOTE — LETTER
6/29/2022         RE: Leah John  [address] [place] [address]        Dear Colleague,    Thank you for referring your patient, Leah John, to the [facility] [place]. Please see a copy of my visit note below.    Leah John is a 41 year old who is being evaluated via a billable telephone visit.      What phone number would you like to be contacted at? [phone]  How would you like to obtain your AVS? MyChart    Post-op Surgical Weight Loss Diet Evaluation     Assessment:  Pt presents for 7 years post-op RD visit, s/p RYGB on 7-15-15 with Dr. Olivier. Today we reviewed current eating habits and level of physical activity, and instructed on the changes that are required for successful bariatric outcomes.    Patient Progress: almost fractured foot and is in a boot for 6 weeks    Initial weight: 311lb- pre-op weight  Current weight: 265lb  Weight change: down 5lb since our last visit in March    Body mass index is 51.67 kg/m .    Patient Active Problem List   Diagnosis     Knee pain     Morbid obesity (H)     Anisometropia     Anxiety     Hyperlipidemia with target LDL less than 160     Hyperparathyroidism (H)     Hypothyroidism     Iron deficiency anemia     Low vitamin B12 level     Metabolic syndrome     MTHFR mutation     PCOS (polycystic ovarian syndrome)     Pre-diabetes     S/P bariatric surgery     Vitamin D deficiency     Morbid obesity with BMI of 50.0-59.9, adult (H)     ALFRED III (cervical intraepithelial neoplasia III)     Stress fracture of right foot, initial encounter     Plantar fasciitis         Vitamins   Multi Vit with Iron: no  Calcium Citrate: no  B12: no  D3: no      Diet Recall/Time:   Breakfast: slice of toast with butter  Lunch: bringing lunch to work- a couple bites of a sandwich  Dinner: sandwich  +protein shake between meals  Typical Snacks: none- might have a snack mid-afternoon   +predict that patient is not  "eating enough, she reports that this is her default, feeling like the only way she can lose weight is to starve herself  +managed expectations for weight loss- 1-2lb per week and might not happen every week    Exercise: getting about 7000 steps per day      PES statement:      (NC-1.4) Altered GI Function related to Alteration in gastrointestinal tract structure and/or function/ Decreased functional length of the GI tract as evidenced by Gastric bypass surgery  (NI-5.7.1) Inadequate protein intake related to Gastric bypass causing increased nutrient needs due to malabsorption/ Decreased ability to consume sufficient protein as evidenced by Edema, poor musculature, dull skin, thin and fragile hair; and Estimated intake of protein insufficient to meet requirements    Intervention    Discussion  1. Recommended to consume 15-20gm protein at 3 meals daily, along with protein supplement/\"planned protein containing snack\" of 15-30gm protein, to reach goal of 60-80 gm protein daily.  2. Educated on post-op vitamin regimen: Multi Vit + iron 2x/day, calcium citrate 400-600 mg 2x/day, 8878-0907 mcg of Sublingual B-12 daily, and 5000 IU Vitamin D3 daily (MVI and calcium can be taken at the same time BID)  3. Reviewed lean protein sources  4. Bariatric Plate Method-  including lean/low fat protein at each meal, including a vegetable/fruit, and limiting carbohydrate intake to less than 25% of plate volume.     Goals:  1. Start taking vitamins again  2. 20-30g protein per meal  3. Drink plenty of water    Assessment/Plan:    Pt to follow up in 1 month with bariatrician and 2 months with RD      Phone call duration: 23 minutes      JACLYN PAN RD            Again, thank you for allowing me to participate in the care of your patient.        Sincerely,        JACLYN PAN RD    "

## 2022-06-29 NOTE — PROGRESS NOTES
Leah John is a 41 year old who is being evaluated via a billable telephone visit.      What phone number would you like to be contacted at? [phone]  How would you like to obtain your AVS? [place]    Post-op Surgical Weight Loss Diet Evaluation     Assessment:  Pt presents for 7 years post-op RD visit, s/p RYGB on 7-15-15 with Dr. Olivier. Today we reviewed current eating habits and level of physical activity, and instructed on the changes that are required for successful bariatric outcomes.    Patient Progress: almost fractured foot and is in a boot for 6 weeks    Initial weight: 311lb- pre-op weight  Current weight: 265lb  Weight change: down 5lb since our last visit in March    Body mass index is 51.67 kg/m .    Patient Active Problem List   Diagnosis     Knee pain     Morbid obesity (H)     Anisometropia     Anxiety     Hyperlipidemia with target LDL less than 160     Hyperparathyroidism (H)     Hypothyroidism     Iron deficiency anemia     Low vitamin B12 level     Metabolic syndrome     MTHFR mutation     PCOS (polycystic ovarian syndrome)     Pre-diabetes     S/P bariatric surgery     Vitamin D deficiency     Morbid obesity with BMI of 50.0-59.9, adult (H)     ALFRED III (cervical intraepithelial neoplasia III)     Stress fracture of right foot, initial encounter     Plantar fasciitis         Vitamins   Multi Vit with Iron: no  Calcium Citrate: no  B12: no  D3: no      Diet Recall/Time:   Breakfast: slice of toast with butter  Lunch: bringing lunch to work- a couple bites of a sandwich  Dinner: sandwich  +protein shake between meals  Typical Snacks: none- might have a snack mid-afternoon   +predict that patient is not eating enough, she reports that this is her default, feeling like the only way she can lose weight is to starve herself  +managed expectations for weight loss- 1-2lb per week and might not happen every week    Exercise: getting about 7000 steps per day      PES statement:      (NC-1.4)  "Altered GI Function related to Alteration in gastrointestinal tract structure and/or function/ Decreased functional length of the GI tract as evidenced by Gastric bypass surgery  (NI-5.7.1) Inadequate protein intake related to Gastric bypass causing increased nutrient needs due to malabsorption/ Decreased ability to consume sufficient protein as evidenced by Edema, poor musculature, dull skin, thin and fragile hair; and Estimated intake of protein insufficient to meet requirements    Intervention    Discussion  1. Recommended to consume 15-20gm protein at 3 meals daily, along with protein supplement/\"planned protein containing snack\" of 15-30gm protein, to reach goal of 60-80 gm protein daily.  2. Educated on post-op vitamin regimen: Multi Vit + iron 2x/day, calcium citrate 400-600 mg 2x/day, 9865-7461 mcg of Sublingual B-12 daily, and 5000 IU Vitamin D3 daily (MVI and calcium can be taken at the same time BID)  3. Reviewed lean protein sources  4. Bariatric Plate Method-  including lean/low fat protein at each meal, including a vegetable/fruit, and limiting carbohydrate intake to less than 25% of plate volume.     Goals:  1. Start taking vitamins again  2. 20-30g protein per meal  3. Drink plenty of water    Assessment/Plan:    Pt to follow up in 1 month with bariatrician and 2 months with RD      Phone call duration: 23 minutes      JACLYN PAN RD        "

## 2022-07-26 ENCOUNTER — LAB (OUTPATIENT)
Dept: LAB | Facility: CLINIC | Age: 42
End: 2022-07-26
Payer: COMMERCIAL

## 2022-07-26 DIAGNOSIS — D50.8 OTHER IRON DEFICIENCY ANEMIA: ICD-10-CM

## 2022-07-26 DIAGNOSIS — K91.2 POSTOPERATIVE MALABSORPTION: ICD-10-CM

## 2022-07-26 DIAGNOSIS — D50.9 IRON DEFICIENCY ANEMIA, UNSPECIFIED IRON DEFICIENCY ANEMIA TYPE: ICD-10-CM

## 2022-07-26 DIAGNOSIS — E21.1 SECONDARY HYPERPARATHYROIDISM, NON-RENAL (H): ICD-10-CM

## 2022-07-26 LAB
FERRITIN SERPL-MCNC: 5 NG/ML (ref 6–175)
PTH-INTACT SERPL-MCNC: 54 PG/ML (ref 15–65)

## 2022-07-26 PROCEDURE — 82306 VITAMIN D 25 HYDROXY: CPT

## 2022-07-26 PROCEDURE — 83970 ASSAY OF PARATHORMONE: CPT

## 2022-07-26 PROCEDURE — 36415 COLL VENOUS BLD VENIPUNCTURE: CPT

## 2022-07-26 PROCEDURE — 82728 ASSAY OF FERRITIN: CPT

## 2022-07-27 ENCOUNTER — LAB (OUTPATIENT)
Dept: LAB | Facility: CLINIC | Age: 42
End: 2022-07-27
Payer: COMMERCIAL

## 2022-07-27 DIAGNOSIS — D50.8 OTHER IRON DEFICIENCY ANEMIA: ICD-10-CM

## 2022-07-27 DIAGNOSIS — D50.9 IRON DEFICIENCY ANEMIA, UNSPECIFIED IRON DEFICIENCY ANEMIA TYPE: ICD-10-CM

## 2022-07-27 DIAGNOSIS — E21.1 SECONDARY HYPERPARATHYROIDISM, NON-RENAL (H): ICD-10-CM

## 2022-07-27 DIAGNOSIS — K91.2 POSTOPERATIVE MALABSORPTION: ICD-10-CM

## 2022-07-27 LAB
BASOPHILS # BLD AUTO: 0.1 10E3/UL (ref 0–0.2)
BASOPHILS NFR BLD AUTO: 1 %
DEPRECATED CALCIDIOL+CALCIFEROL SERPL-MC: 35 UG/L (ref 20–75)
EOSINOPHIL # BLD AUTO: 0.2 10E3/UL (ref 0–0.7)
EOSINOPHIL NFR BLD AUTO: 3 %
ERYTHROCYTE [DISTWIDTH] IN BLOOD BY AUTOMATED COUNT: 14.4 % (ref 10–15)
HCT VFR BLD AUTO: 31.9 % (ref 35–47)
HGB BLD-MCNC: 10.1 G/DL (ref 11.7–15.7)
IMM GRANULOCYTES # BLD: 0 10E3/UL
IMM GRANULOCYTES NFR BLD: 0 %
LYMPHOCYTES # BLD AUTO: 2.3 10E3/UL (ref 0.8–5.3)
LYMPHOCYTES NFR BLD AUTO: 34 %
MCH RBC QN AUTO: 24.5 PG (ref 26.5–33)
MCHC RBC AUTO-ENTMCNC: 31.7 G/DL (ref 31.5–36.5)
MCV RBC AUTO: 77 FL (ref 78–100)
MONOCYTES # BLD AUTO: 0.4 10E3/UL (ref 0–1.3)
MONOCYTES NFR BLD AUTO: 6 %
NEUTROPHILS # BLD AUTO: 3.8 10E3/UL (ref 1.6–8.3)
NEUTROPHILS NFR BLD AUTO: 55 %
PLATELET # BLD AUTO: 316 10E3/UL (ref 150–450)
RBC # BLD AUTO: 4.12 10E6/UL (ref 3.8–5.2)
WBC # BLD AUTO: 6.8 10E3/UL (ref 4–11)

## 2022-07-27 PROCEDURE — 85025 COMPLETE CBC W/AUTO DIFF WBC: CPT

## 2022-07-27 PROCEDURE — 36415 COLL VENOUS BLD VENIPUNCTURE: CPT

## 2022-07-28 DIAGNOSIS — D50.9 IRON DEFICIENCY ANEMIA, UNSPECIFIED IRON DEFICIENCY ANEMIA TYPE: ICD-10-CM

## 2022-07-28 DIAGNOSIS — K91.2 POSTOPERATIVE MALABSORPTION: ICD-10-CM

## 2022-07-28 DIAGNOSIS — Z98.84 S/P BARIATRIC SURGERY: ICD-10-CM

## 2022-07-28 RX ORDER — BACILLUS COAGULANS 1B CELL
CAPSULE ORAL
Qty: 120 TABLET | Refills: 0 | Status: SHIPPED | OUTPATIENT
Start: 2022-07-28 | End: 2022-08-01

## 2022-07-31 NOTE — PROGRESS NOTES
Bariatric Follow Up Visit with a History of Previous Bariatric Surgery     Date of visit: 7/31/2022  Physician: Manoj Stover MD, MD  Primary Care Provider:  Andra Olsen  Leah John   41 year old  female    Date of Surgery: 7/15/15  Initial Weight: 311 lbs  Initial BMI: 59.7  Today's Weight:   Wt Readings from Last 1 Encounters:   08/01/22 121.1 kg (266 lb 14.4 oz)     Weight history:   Wt Readings from Last 4 Encounters:   08/01/22 121.1 kg (266 lb 14.4 oz)   06/29/22 120.2 kg (265 lb)   06/10/22 122.5 kg (270 lb)   03/23/22 122.5 kg (270 lb)      Body mass index is 52.04 kg/m .      Assessment and Plan     Assessment: Leha is a 41 year old year old female who is 7 years s/p  Pavithra en Y Gastric Bypass with Dr. Olivier.  She'd started up phentermine therapy following having a child last year and stopping breast feeding this past winter. Since our March 2022 initiation of phentermine, she has had tolerated it well, down about 5 lbs but limited by stress fracture of foot this spring. Medication has been tolerated/used and iron supplementation started as well and we'll plan to continue iron and phentermine therapies, recheck blood counts after September and re-evaluated medication tolerance/use in 3 months given her resistant super morbid obesity (BMI>50). She's previously not had coverage for Wegovy. She's been working with our dietician though the spring and summer again and working on her tendency to over-restrict her diet and then be prone to nibbling behaviors on low nutrient foods.  Leah John feels as if she has not yet achieved the goals she hoped to accomplish through bariatric surgery and weight loss.    Encounter Diagnoses   Name Primary?     Morbid obesity with BMI of 50.0-59.9, adult (H) Yes     Postoperative malabsorption      S/P bariatric surgery      Iron deficiency anemia, unspecified iron deficiency anemia type          Current Outpatient Medications:      calcium citrate  "(CITRACAL) 950 (200 Ca) MG tablet, Take 1 tablet by mouth 2 times daily, Disp: , Rfl:      ferrous fumarate 65 mg, [place]. FE,-Vitamin C 125 mg (VITRON-C)  MG TABS tablet, Use twice daily, 20minutes after meals for 30 days then reduce to once daily dosing thereafter for 2 months until gone., Disp: 120 tablet, Rfl: 0     Multiple Vitamin (MULTIVITAMIN ADULT PO), , Disp: , Rfl:      phentermine (ADIPEX-P) 37.5 MG tablet, Start half tablet daily after breakfast., Disp: 45 tablet, Rfl: 0     vitamin B-12 (CYANOCOBALAMIN) 500 MCG tablet, , Disp: , Rfl:      Vitamin D, Cholecalciferol, 25 MCG (1000 UT) TABS, , Disp: , Rfl:     Plan:    Return in about 3 months (around 11/1/2022).    Bariatric Surgery Review     Interim History/LifeChanges: had 2nd metatarsal stress fracture 6 weeks ago (diagnosed) after long term pain that worsened this spring. She's been in a boot the last 6 weeks, and will be coming   Phentermine half tablet after breakfast. Notices some improvement in apppetite  Patient Concerns: med check  Appetite (1-10): good  GERD: no.    No menstrual periods.   4 year old daughter.    Medication changes: stable.    Vitamin Intake:   B-12   yes   MVI  Centrum 2 daily.   Vitamin D  yes   Calcium   citrate     Other  vitron once daily.              LABS: \"Reviewed with mild iron def anemia still, low ferritin of 5. No menses.    Nausea no  Vomiting no  Constipation no  Diarrhea no  Rashes no  Hair Loss no  Reactive Hypoglycemia very rarely now.   Light Headedness no   Moods some anxiety.      Most recent labs:  Lab Results   Component Value Date    WBC 6.8 07/27/2022    HGB 10.1 (L) 07/27/2022    HCT 31.9 (L) 07/27/2022    MCV 77 (L) 07/27/2022     07/27/2022     Lab Results   Component Value Date    CHOL 190 08/17/2021     Lab Results   Component Value Date    HDL 53 08/17/2021     No components found for: LDLCALC  Lab Results   Component Value Date    TRIG 85 08/17/2021     No results found for: " "CHOLHDL  Lab Results   Component Value Date    ALT 11 03/29/2022    AST 16 03/29/2022    ALKPHOS 96 03/29/2022     No results found for: HGBA1C  No components found for: WSJMZPDR00  No components found for: YCZUKPUX01RC  No components found for: FERRITIN  No components found for: PTH  No components found for: 48055  No components found for: 7597  Lab Results   Component Value Date    TSH 4.57 03/29/2022     No results found for: TESTOSTERONE    Habits:  Reviewed importance of getting meals through the day as her tendency is to skip and then have reactive eating in the evenings (\" is the Jaden Snacker\" and 6 yo has carb centric diet that can present temptation problems.  Social History     Social History     Socioeconomic History     Marital status:      Spouse name: Not on file     Number of children: Not on file     Years of education: Not on file     Highest education level: Not on file   Occupational History     Not on file   Tobacco Use     Smoking status: Never Smoker     Smokeless tobacco: Never Used   Substance and Sexual Activity     Alcohol use: Yes     Alcohol/week: 2.5 standard drinks     Comment: armen     Drug use: No     Sexual activity: Yes     Partners: Male     Birth control/protection: I.U.D., None   Other Topics Concern     Parent/sibling w/ CABG, MI or angioplasty before 65F 55M? No   Social History Narrative     Not on file     Social Determinants of Health     Financial Resource Strain: Not on file   Food Insecurity: Not on file   Transportation Needs: Not on file   Physical Activity: Not on file   Stress: Not on file   Social Connections: Not on file   Intimate Partner Violence: Not on file   Housing Stability: Not on file       Past Medical History     Past Medical History:   Diagnosis Date     Anisometropia      Anxiety      Cervical intraepithelial neoplasia III 2013     Depression     20s; on medications zoloft; prozac x 1 year     Depressive disorder      Dyslipidemia 2014     " "Hypothyroidism      Metabolic syndrome      Morbid obesity (H)      MTHFR mutation     heterozygous     PCOS (polycystic ovarian syndrome)      Polycystic ovary syndrome      Pre-diabetes      Prothrombin gene mutation (H) 2017    heterozygous     Strabismus      Problem List     Patient Active Problem List   Diagnosis     Knee pain     Morbid obesity (H)     Anisometropia     Anxiety     Hyperlipidemia with target LDL less than 160     Hyperparathyroidism (H)     Hypothyroidism     Iron deficiency anemia     Low vitamin B12 level     Metabolic syndrome     MTHFR mutation     PCOS (polycystic ovarian syndrome)     Pre-diabetes     S/P bariatric surgery     Vitamin D deficiency     Morbid obesity with BMI of 50.0-59.9, adult (H)     ALFRED III (cervical intraepithelial neoplasia III)     Stress fracture of right foot, initial encounter     Plantar fasciitis     Medications     [unfilled]  Surgical History     Past Surgical History  She has a past surgical history that includes leep tx, cervical (); Excise ganglion wrist (Right); Eye surgery; Pr Lap Gastric Bypass/Pavithra-En-Y (N/A, 07/15/2015); Biopsy cervical, local excision, single/multiple (); and  Section (N/A, 2018).    Objective-Exam     Constitutional:  /70   Ht 1.525 m (5' 0.05\")   Wt 121.1 kg (266 lb 14.4 oz)   BMI 52.04 kg/m    [unfilled]   General:  Pleasant and in no acute distress   Eyes:  EOMI  ENT:  Airway patent    Neck:  Respiratory: Normal respiratory effort, no cough, .  CV:  RRR  Gastrointestinal: obese, nontender  Musculoskeletal: muscle mass WNL.  walker boot on right foot. Calf nontender to palpation.  Skin: color fair hair red.,   Psychiatric: alert and oriented X3, mood and affect normal    Counseling     We reviewed the important post op bariatric recommendations:  -eating 3 meals daily  -eating protein first, getting >60gm protein daily  -eating slowly, chewing food well  -avoiding/limiting calorie " containing beverages  -drinking water 15-30 minutes before or after meals  -limiting restaurant or cafeteria eating to twice a week or less    We discussed the importance of restorative sleep and stress management in maintaining a healthy weight.  We discussed the National Weight Control Registry healthy weight maintenance strategies and ways to optimize metabolism.  We discussed the importance of physical activity including cardiovascular and strength training in maintaining a healthier weight.    We discussed the importance of life-long vitamin supplementation and life-long  follow-up.    Leah was reminded that, to avoid marginal ulcers she should avoid tobacco at all, alcohol in excess, caffeine in excess, and NSAIDS (unless indicated for cardioprotection or othewise and opposed by a PPI).    Manoj Stover MD    [facility].  2022  12:42 PM  [phone] (clinic phone)  [phone] (fax)    No images are attached to the encounter.  Medical Decision Makin minutes spent on the date of the encounter doing chart review, history and exam, documentation and further activities per the note

## 2022-08-01 ENCOUNTER — OFFICE VISIT (OUTPATIENT)
Dept: SURGERY | Facility: CLINIC | Age: 42
End: 2022-08-01
Payer: COMMERCIAL

## 2022-08-01 VITALS
HEIGHT: 60 IN | WEIGHT: 266.9 LBS | SYSTOLIC BLOOD PRESSURE: 106 MMHG | DIASTOLIC BLOOD PRESSURE: 70 MMHG | BODY MASS INDEX: 52.4 KG/M2

## 2022-08-01 DIAGNOSIS — Z98.84 S/P BARIATRIC SURGERY: ICD-10-CM

## 2022-08-01 DIAGNOSIS — D50.9 IRON DEFICIENCY ANEMIA, UNSPECIFIED IRON DEFICIENCY ANEMIA TYPE: ICD-10-CM

## 2022-08-01 DIAGNOSIS — E66.01 MORBID OBESITY WITH BMI OF 50.0-59.9, ADULT (H): Primary | ICD-10-CM

## 2022-08-01 DIAGNOSIS — K91.2 POSTOPERATIVE MALABSORPTION: ICD-10-CM

## 2022-08-01 PROCEDURE — 99214 OFFICE O/P EST MOD 30 MIN: CPT | Performed by: EMERGENCY MEDICINE

## 2022-08-01 RX ORDER — IBUPROFEN 200 MG
1 CAPSULE ORAL 2 TIMES DAILY
COMMUNITY

## 2022-08-01 RX ORDER — BACILLUS COAGULANS 1B CELL
CAPSULE ORAL
Qty: 120 TABLET | Refills: 0 | Status: SHIPPED | OUTPATIENT
Start: 2022-08-01

## 2022-08-01 RX ORDER — PHENTERMINE HYDROCHLORIDE 37.5 MG/1
TABLET ORAL
Qty: 45 TABLET | Refills: 0 | Status: SHIPPED | OUTPATIENT
Start: 2022-08-01 | End: 2022-10-12

## 2022-08-01 RX ORDER — MULTIVIT-MIN/IRON/FOLIC ACID/K 18-600-40
CAPSULE ORAL
COMMUNITY

## 2022-08-01 NOTE — PATIENT INSTRUCTIONS
Plan:  Rehabilitate your foot stress fracture per podiatry's guidance.   Continuing aiming to ramp up strength work and low impact exercise as foot recovers.  Refill of vitron C due to some persisting iron deficiency. Once daily. Check your multivitamin to make sure it has at least 18mg of iron per serving as well. Consider dose of Blood builder daily as well this summer.  Refill of phentermine. Half tablet can be used slightly later in the morning, no later than lunch if planning to get to bed 9-10 hours later. Make sure to get your meals in with protein rich options every 4-5 hours.       Information about the Weight Loss Medication Phentermine    When combined with mindful eating and behavior changes, weight loss medications can be a nice additional tool to maximize your weight loss season.  There are no magic pills and without diet and behavior changes, weight loss will be minimal.  Think of this medication as a tool to make your diet and behavior changes easier and you'll enjoy a higher probability of success.  Remember not to skip meals, but use this medication to tolerate your reduced calories more easily.  If you are very hungry in the evenings, you are likely not eating enough in the first 10 hours of your day and need to focus on getting your protein requirements in at each of your 3 daily meals.      Phentermine is a stimulant medication related to the amphetamine class of medication but with a lower risk of dependence and addiction.  It is used for weight loss by suppressing the appetite region of the brain.  It also may speed up the metabolic rate and give a person more energy.  Like any medication there are potential side effects and the most common are:  Dry mouth occurs in almost everyone (hydrate well), fewer people experience Palpatations, fast heart rate, elevation of blood pressure, restlessness, insomnia, dizziness, change in mood, tremor, headache, changes in bowel movements,itchiness, changes  "in sex drive.  If you are or may have become pregnant, do not use phentermine as it increases the risk for birth defects/miscarriage.  Do not use if breastfeeding.    Some people can develop serious side effects which include:  Heart strain (\"ischemia\").  Tachycardia (fast heart rate or irregular heart rate).  Hypertension  Pulmonary Hypertension  Psychosis  Dependency and abuse has occurred in some.  If you've been on high dose (37.5mg) for long periods, phentermine should be tapered down over a few weeks before abruptly stopping as seizures have been reported rarely.    We do not recommend taking it in combination with the following medications due to potential drug interactions which can increase the risk of side effects and/or potential for seizures:    Absolutely contraindicated are:  Amphetamines or other stimulants like ADHD medication: (dextroamphetamine, amphetamine, diethylpropion, isocarboxazid, methamphetamine, lisdexamfetamine, benzphetamine,dexmethylphenidate, methylphenidate, selegiline patch, sibutramine, tranylcypromine.    Avoid use with:   Dopamine, dobutamine, ephedra, ephedrine, epinephrine, isoproterenol, linezolid, norepinephrine, phenylephrine injection, venlafaxine (Effexor).    Monitor or modify dose with:  Acebutolol, atenolol, betaxolol, bisoprolol, carvedilol, droxidopa, esmolol, labetalol, magnesium citrate, metoprolol, nadolol, nebivolol, penbutolol, pindolol, propranolol, sotalol, timolol.    Caution with: armodafinil,betaxolol eye drops, brexpiprazole, bupropion, busulfan, caffeine, carteolol drops, enzalutaminde, ginseng, green tea, guarana, levobunolol drops, lindane cream, modafinil, afrin nasal spray (oxymetazoline), pamabrom, phenylephrine oral and nasal spray, pseudoephedrine (sudafed), rasgiline, sleegiline, bowel prep, tiagabine, timolol drops,  TRAMADOL due to increased risk of seizures.    The current cheapest place to fill your prescription is at [facility] " "[place] or [place] [facility] or [employer] and is around $22for 90 tablets.  Occasionally, [employer] and [employer] have price matched, so call around and get the best price for you.  Other pharmacies may charge closer to$70- $100 for the same prescription. You don't have to be a member to use the pharmacy at [facility] currently.  An alternative some patients have tried is using a voucher system through [facility].  $25 paid on their website gets you a  voucher that can allows you to pick your meds up at [facility] without paying anything more.  [employer] may also offer discounted coupons and give prices around you.    Dosing:  We start with half a tablet for the first 2-3 weeks and if tolerating it without problems, you can take up to one full tablet daily in the morning after breakfast.  For those with evening hunger problems, sometimes half a tablet in the morning and half a tablet around 1 pm can be effective, however, risks of nighttime insomnia/restless increase with afternoon dosing so call me at the clinic if considering this regimen or having any issues.  You only have to use the amount effective for you, not to exceed one full tablet.  It can also be used situationaly and does not have to be taken every day. For more sensitive individuals,: get a pill cutter and cut the half tab into quarters and use a quarter of a tablet, about 9mg, for a couple weeks before increasing to half a tablet (18.75mg) if needed.  As always, if any questions give us a call at the [facility] telephone:  [phone].     Don't use Phentermine if sick/ill or using other stimulants/cold medications and it's OK to skip days that you don't feel the need for appetite suppressant assistance.  This medication works the day you take it and doesn't require \"building up\" in the system.        [facility]  Nutritional Guidelines  Gastric Bypass 18 Months Post Op and Beyond    General Guidelines and Helpful " Hints:  Eat 3 meals per day + protein supplement(s). No snacks between meals.  Do not skip meals.  This can cause overeating at the next meal and will prevent adequate protein and nutritional intake.  Aim for 60-80 grams of protein per day.  Always eat your protein first. This assists with optimal nutrition and helps you stay full longer.  Depending on your portion size, you may need to drink approved protein supplement between meals to achieve protein goals. Follow recommendations of your Dietitian.   Eat your protein first, and then follow with fiber.   It is not necessary to count your fiber, but 15-20 grams per day is recommended.    Add fiber by including fruits, vegetables, whole grains, and beans.   Portions should remain about 1 cup per meal. Use measuring cups to be accurate.  Continue to use saucer/salad plates, infant/toddler silverware to keep portion sizes small and take small bites.  Eat S-[id number]-Y to make each meal last 20-30 minutes. Always stop eating when satisfied.  Continue to use caution with foods containing skins, peels or membranes. Chew well!  Aim for 64 oz. of calorie-free fluids daily.  Continue to avoid caffeine and carbonation. If you choose to drink alcohol, do so in moderation.   Remember to avoid drinking during meals, 15-30 minutes before and 30 minutes after.  Exercise is benavidez for continued weight loss and weight maintenance. 150 minutes weekly of moderate aerobic activity or 75 minutes of vigorous with 2 days or more a week of strength training. Try to get 20% or more of your steps each day at a brisk pace, as though hurrying to a bus stop. Look to get stronger this year.  If having trouble tolerating meat, try using a crock-pot, tinfoil tent, steamer or other moist cooking method to create tender meats. Add broth or low-fat gravy to help meat stay moist.   Avoid high sugar and high fat foods to prevent dumping syndrome.  Check nutrition labels for less than 10 grams of sugar and  "less than 10 grams of fat per serving.  Continue Taking Vitamins/Minerals:  1000 mcg of Sublingual B-12 at least 3 days weekly to average 350-500mcg/day. If using 2500mcg lozenges, 2 weekly.  If 5000 mcg, once weekly dosing works.  1 Complete Multivitamin with 18mg Iron twice daily (chewable or swallow tabs). Often sold as \"women's one a day\" if tablet but take twice daily.  500-600 mg Calcium Citrate twice daily (chewable or swallow tabs).  5000 IU Vitamin D3 daily.  If menstruating, you may need closer to 60mg of iron daily to prevent iron deficiency. An occasional \"boost\" of extra iron supplement during/after is reasonable if heavy flow.    Sample Grocery List    Protein:  Fat free Greek or light yogurt (less than 10 grams sugar)  Fat free or low-fat cottage cheese  String cheese or reduced fat cheese slices  Tuna, salmon, crab, egg, or chicken salad made with light or fat free mayonnaise  Egg or Egg Substitute  Lean/extra lean turkey, beef, bison, venison (ground, sirloin, round, flank)  Pork loin or tenderloin (grilled, baked, broiled)  Fish such as salmon, tuna, trout, tilapia, etc. (grilled, baked, broiled)  Tender cuts of lean (skinless) turkey or chicken  Lean deli meats: turkey, lean ham, chicken, lean roast beef  Beans such as kidney, garbanzo, black, mendez, or low-fat/fat free refried beans  Peanut butter (natural preferred). Limit to 1 Tbsp. per day.  Low-fat meatloaf (made with lean ground beef or turkey)  Sloppy Joes made with low-sugar ketchup and lean ground beef or turkey  Soy or vegetable protein (i.e. vegan crumbles, soy/veggie burger, tofu)  Hummus    Vegetables:  Fresh: cooked or raw (as tolerated)  Frozen vegetables  Canned vegetables (low sodium or no salt added, rinse before cooking/eating)  (Ok to have skins/peels/membranes/seeds - just chew well)    Fruits:  Fresh fruit  Frozen fruit (no sugar added)  Canned fruit (packed in its own juice, NOT syrup)  (Ok to have " skins/peels/membranes/seeds - just chew well)    Starch:  Unsweetened whole-grain hot cereal (or high fiber cold cereal, dry)  Toasted whole wheat bread or [place] Thins  Whole grain crackers  Baked /boiled/mashed potato/sweet potato  Cooked whole grain pasta, brown rice, or other cooked whole grains  Starchy vegetables: corn, peas, winter squash    Protein Supplement:   Ready to drink protein shake with:  15-30 grams protein per serving  Less than 10 grams total carbohydrate per serving   Protein powder mixed with:   Skim or 1% milk  Low fat or fat free Lactaid milk, plain or no sugar added soymilk  Water     Fats: (use in moderation)  1 teaspoon of soft tub margarine  1 teaspoon olive oil, canola oil, or peanut oil  1 tablespoon of low-fat mccormick or salad dressing     Sample Menu for 18+ months after Gastric Bypass    You do NOT need to eat/drink the full portion sizes listed below  Always stop when you are satisfied    Breakfast   cup 1% cottage cheese     cup mixed berries   Lunch 2 oz lean roast beef on   [place] Thin with 1 tsp. light mccormick    small tomato, chopped, mixed with 1 tsp. light vinaigrette dressing   Supplement Approved protein supplement (if needed between meals)   Dinner 2 oz grilled salmon    cup salad greens with 1 tsp. light salad dressing and 1 tsp. ground flax seed    cup quinoa or brown rice     Breakfast   cup egg substitute with   cup sautéed chopped vegetables  2 light [place] Krisp crackers   Lunch Tuna Melt:   cup tuna mixed with 1 tsp. light mccormick over   [place] Thin. Top with 2-3 slices cucumber and 1 oz slice of low fat cheese   Supplement 1 cup skim milk (if needed between meals)   Dinner 3 oz  grilled, broiled, or baked seasoned skinless chicken breast    cup asparagus     Breakfast   cup plain oatmeal made with skim or 1% milk with 1 Tbsp. flavored/unflavored protein powder added  1 mozzarella string cheese   Lunch 2 oz deli turkey breast  1/3 cup salad with 1 tsp. light salad  dressing, 1/8 of a whole avocado and 1 Tbsp. sunflower seeds   Dinner 3 oz. pork loin made in a crock pot, seasoned with a spice rub    cup cooked carrots   Supplement Approved protein supplement (if needed between meals)     Breakfast 1 cup breakfast casserole made with egg substitute, turkey sausage,  and steamed, chopped bell peppers   Supplement  1 cup light Greek yogurt (if needed between meals)   Lunch 2 oz. teriyaki turkey    cup mashed sweet potato with 1-2 spritzes of spray butter    cup fresh pineapple   Dinner 3 oz low fat meatloaf    cup roasted garlic zucchini     Breakfast   cup leftover breakfast casserole    cup no sugar added applesauce with 1 Tbsp. unflavored protein powder and a sprinkle of cinnamon    Lunch 3 oz shrimp with 1-2 Tbsp. low-sugar cocktail sauce for dipping    c. whole wheat pasta drizzled with   tsp. olive oil   Supplement 1 cup skim/1% milk with scoop of protein powder (if needed between meals)   Dinner Grilled, seasoned kebob with 2 oz lean beef and   cup vegetables     Breakfast Breakfast pizza:   [place] Thin spread with 1 Tbsp. low sugar spaghetti sauce,   cup shredded low fat cheese, melted and 1 slice of [place] varma     cup fresh fruit mixed with chopped almonds   Lunch   cup black bean soup  4-5 whole grain crackers   Dinner 3 oz  tilapia with lemon pepper seasoning    cup stewed tomatoes   Supplement 1 string cheese (if needed between meals)     Breakfast 2 hard boiled eggs (discard 1 egg yolk)    whole wheat English Muffin with 1 tsp. low sugar jelly   Lunch   cup leftover black bean soup topped with 1-2 Tbsp. low fat cheese  2-3 light [place] Krisp crackers   Supplement Approved protein supplement (if needed between meals)   Dinner 3 oz sirloin steak    cup steamed broccoli      LEAN PROTEIN SOURCES  Getting 20-30 grams of protein, 3 meals daily, is appropriate for most people, some need more but more than about 40 grams per meal is not useful.  General rule is drinking  one ounce of water per gram of protein eaten over the course of the day:  70 grams of protein each day, drink 70 oz of water.  Protein Source Portion Calories Grams of Protein                           Nonfat, plain Greek yogurt    (10 grams sugar or less) 3/4 cup (6 oz)  12-17   Light Yogurt (10 grams sugar or less) 3/4 cup (6 oz)  6-8   Protein Shake 1 shake 110-180 15-30   Skim/1% Milk or lactose-free milk 1 cup ( 8 oz)  8   Plain or light, flavored soymilk 1 cup  7-8   Plain or light, hemp milk 1 cup 110 6   Fat Free or 1% Cottage Cheese 1/2 cup 90 15   Part skim ricotta cheese 1/2 cup 100 14   Part skim or reduced fat cheese slices 1 ounce 65-80 8     Mozzarella String Cheese 1 80 8   Canned tuna, chicken, crab or salmon  (canned in water)  1/2 cup 100 15-20   White fish (broiled, grilled, baked) 3 ounces 100 21   [place]/Tuna (broiled, grilled, baked) 3 ounces 150-180 21   Shrimp, Scallops, Lobster, Crab 3 ounces 100 21   Pork loin, Pork Tenderloin 3 ounces 150 21   Boneless, skinless chicken /turkey breast                          (broiled, grilled, baked) 3 ounces 120 21   [place], [place], [place], and Venison 3 ounces 120 21   Lean cuts of red meat and pork (sirloin,   round, tenderloin, flank, ground 93%-96%) 3 ounces 170 21   Lean or Extra Lean Ground Turkey 1/2 cup 150 20   90-95% Lean Belt Burger 1 david 140-180 21   Low-fat casserole with lean meat 3/4 cup 200 17   Luncheon Meats                                                        (turkey, lean ham, roast beef, chicken) 3 ounces 100 21   Egg (boiled, poached, scrambled) 1 Egg 60 7   Egg Substitute 1/2 cup 70 10   Nuts (limit to 1 serving per day)  3 Tbsp. 150 7   Nut Burt (peanut, almond)  Limit to 1 serving or less daily 1 Tbsp. 90 4   Soy Burger (varies) 1  15   Garbanzo, Black, [place] Beans 1/2 cup 110 7   Refried Beans 1/2 cup 100 7   Kidney and Lima beans 1/2 cup 110 7   Tempeh 3 oz 175 18   Vegan crumbles 1/2 cup  100 14   Tofu 1/2 cup 110 14   Chili (beans and extra lean beef or turkey) 1 cup 200 23   Lentil Stew/Soup 1 cup 150 12   Black Bean Soup 1 cup 175 12       Exercise Guidance    Nearly everything that bothers us gets better when the proper amount of exercise can be done in the proper amounts.  Getting to that level safely and without injury is the key.  When it comes to weight loss, exercise is especially important in maintaining the weight loss.  Unfortunately, one of the harsh realities is that substantial weight loss slows our metabolism, often anywhere from 5-20%.    Our brain always remembers our heaviest weight and we can return to that if we're not mindful and moving regularly.  Our biology doesn't understand the concept of having too much energy, only not having enough.  As such, when we lose weight, it's thought that the brain interprets this as we're ill or in a famine and dials back our metabolism to limit further weight loss.  This is why exercise is so important in keeping the weight off and is the main reason people have some weight regain from their low weight point after weight loss.  We have to make up that 10-20% of calories not being burned.Since we can restrict our intake for only so long, exercise becomes very important in our long term healthy weigh maintenance to balance out the occasional indiscretion with our diet.    Generally, for every 5% body weight reduction in a weight loss season, a person needs to add  kilocalories of exercise in their daily routine to keep that weight off for the long term.  This is why it's vital to be starting your fitness regimen during weight loss season, so that routine is well established as you move into your maintenance period.    Additionally, all sorts of good enzymes and genes turn on with exercise and our stress, sleep, mood and bodies feel better when we can get to the point of making ourselves a little sweaty and short of breath 35-50 minutes  "most days of the week. But we have to start with what we can do first and give ourselves permission to work our way up to this goal.    Who isn't ready for exercise? Well, if you get severe dizziness/palpitations, chest pain or short of breath/faint with even minimal activity like walking across a room or you're having to pause while going up a flight of stairs, then getting your heart and/or lungs fully evaluated prior to starting an exercise regimen is recommended. Everyone else can probably start a program, but everyone may start at a different point:  Some can set a 5-10 minute walking goal and others will be able to ride their bike for an hour.      Start with where you're at and look to add 10% more each week until you're at that 150 minutes or more a week (or 75 minutes/week or more of vigorous exercise). Moderate exercise can be estimated as the pace you can carry on a conversation and vigorous is the pace at which you can get 3-5 words out before having to take a breath.  If you're using heart rate monitoring, Moderate is about 60% of your maximum heart rate and vigorous about 75%. (Max heart rate estimated as 220 beats minus your age:  Example: 220-age of 45 =176 Beats per minute (BPM) maximum. 0.6X 176= 105 BPM (moderate), 132 BMP(vigorous)).    If you like to count steps, the 10,000 steps per day does correlate well with weight maintenance but try to make at least 20-25% of those steps at a brisk pace (like you are about to miss your bus).    Finally, if you are pressed for time, it's important to know that some exercise is better than none.  High Intensity Interval training (HIIT) is a good way to get as much out of a short period of working out. If you can't walk, use the stairs, bike or swim; you could use a punching/arm workout regimen for your activity.  The idea with HIIT is to have a 3-6 minute warm up period of low intensity and the 3-6 \"intervals\" where you push the intensity up and then recover " "and start the next interval. One study showed that 3 intervals of 20 seconds at \"Maximum Effort\" while either biking on a stationary bike or going up stairs and then having 100 seconds recovery time before the next Maximum Effort was equally as beneficial on cardiovascular fitness development as doing 30 minutes of moderately paced walking 3 days weekly over a 6 week period of time.  So intensity matters. You just need to be able to safely do your desired exercise without injury. There are many great HIIT exercises/routines out there. IF you're not doing much exercise currently, I recommend giving your self 2-3 weeks of moderate exercise, 3 days weekly minimum to get your bones/tendons/muscles used to exercise before going for High Intensity workouts.    If you like to use Apps on the phone, the couch to 5k brody and 7 minute workout apps are nice places to start if you are reasonably healthy.  There are hundreds of other options out there.  Consider viewing [employer] if gentler exercise/movement is desired. Videos on [place] Chi and chair yoga for seniors exist and are free. Check them out and let's get that 3-4 days a week routine going.    Let's move!  Manoj Stover MD.     "

## 2022-08-01 NOTE — LETTER
8/1/2022         RE: Leah John  [address] [place] [address]        Dear Colleague,    Thank you for referring your patient, Leah John, to the [facility] [place]. Please see a copy of my visit note below.    Bariatric Follow Up Visit with a History of Previous Bariatric Surgery     Date of visit: 7/31/2022  Physician: Manoj Stover MD, MD  Primary Care Provider:  Andra Olsen  Leah BRENT Gagehakan   41 year old  female    Date of Surgery: 7/15/15  Initial Weight: 311 lbs  Initial BMI: 59.7  Today's Weight:   Wt Readings from Last 1 Encounters:   08/01/22 121.1 kg (266 lb 14.4 oz)     Weight history:   Wt Readings from Last 4 Encounters:   08/01/22 121.1 kg (266 lb 14.4 oz)   06/29/22 120.2 kg (265 lb)   06/10/22 122.5 kg (270 lb)   03/23/22 122.5 kg (270 lb)      Body mass index is 52.04 kg/m .      Assessment and Plan     Assessment: Leah is a 41 year old year old female who is 7 years s/p  Pavithra en Y Gastric Bypass with Dr. Olivier.  She'd started up phentermine therapy following having a child last year and stopping breast feeding this past winter. Since our March 2022 initiation of phentermine, she has had tolerated it well, down about 5 lbs but limited by stress fracture of foot this spring. Medication has been tolerated/used and iron supplementation started as well and we'll plan to continue iron and phentermine therapies, recheck blood counts after September and re-evaluated medication tolerance/use in 3 months given her resistant super morbid obesity (BMI>50). She's previously not had coverage for Wegovy. She's been working with our dietician though the spring and summer again and working on her tendency to over-restrict her diet and then be prone to nibbling behaviors on low nutrient foods.  Leah John feels as if she has not yet achieved the goals she hoped to accomplish through bariatric surgery and weight  "loss.    Encounter Diagnoses   Name Primary?     Morbid obesity with BMI of 50.0-59.9, adult (H) Yes     Postoperative malabsorption      S/P bariatric surgery      Iron deficiency anemia, unspecified iron deficiency anemia type          Current Outpatient Medications:      calcium citrate (CITRACAL) 950 (200 Ca) MG tablet, Take 1 tablet by mouth 2 times daily, Disp: , Rfl:      ferrous fumarate 65 mg, [place]. FE,-Vitamin C 125 mg (VITRON-C)  MG TABS tablet, Use twice daily, 20minutes after meals for 30 days then reduce to once daily dosing thereafter for 2 months until gone., Disp: 120 tablet, Rfl: 0     Multiple Vitamin (MULTIVITAMIN ADULT PO), , Disp: , Rfl:      phentermine (ADIPEX-P) 37.5 MG tablet, Start half tablet daily after breakfast., Disp: 45 tablet, Rfl: 0     vitamin B-12 (CYANOCOBALAMIN) 500 MCG tablet, , Disp: , Rfl:      Vitamin D, Cholecalciferol, 25 MCG (1000 UT) TABS, , Disp: , Rfl:     Plan:    Return in about 3 months (around 11/1/2022).    Bariatric Surgery Review     Interim History/LifeChanges: had 2nd metatarsal stress fracture 6 weeks ago (diagnosed) after long term pain that worsened this spring. She's been in a boot the last 6 weeks, and will be coming   Phentermine half tablet after breakfast. Notices some improvement in apppetite  Patient Concerns: med check  Appetite (1-10): good  GERD: no.    No menstrual periods.   4 year old daughter.    Medication changes: stable.    Vitamin Intake:   B-12   yes   MVI  Centrum 2 daily.   Vitamin D  yes   Calcium   citrate     Other  vitron once daily.              LABS: \"Reviewed with mild iron def anemia still, low ferritin of 5. No menses.    Nausea no  Vomiting no  Constipation no  Diarrhea no  Rashes no  Hair Loss no  Reactive Hypoglycemia very rarely now.   Light Headedness no   Moods some anxiety.      Most recent labs:  Lab Results   Component Value Date    WBC 6.8 07/27/2022    HGB 10.1 (L) 07/27/2022    HCT 31.9 (L) 07/27/2022    MCV " "77 (L) 07/27/2022     07/27/2022     Lab Results   Component Value Date    CHOL 190 08/17/2021     Lab Results   Component Value Date    HDL 53 08/17/2021     No components found for: LDLCALC  Lab Results   Component Value Date    TRIG 85 08/17/2021     No results found for: CHOLHDL  Lab Results   Component Value Date    ALT 11 03/29/2022    AST 16 03/29/2022    ALKPHOS 96 03/29/2022     No results found for: HGBA1C  No components found for: ULRLSWZI50  No components found for: OQBTXTJM61ZU  No components found for: FERRITIN  No components found for: PTH  No components found for: 05966  No components found for: 7597  Lab Results   Component Value Date    TSH 4.57 03/29/2022     No results found for: TESTOSTERONE    Habits:  Reviewed importance of getting meals through the day as her tendency is to skip and then have reactive eating in the evenings (\" is the Jaden Snacker\" and 4 yo has carb centric diet that can present temptation problems.  Social History     Social History     Socioeconomic History     Marital status:      Spouse name: Not on file     Number of children: Not on file     Years of education: Not on file     Highest education level: Not on file   Occupational History     Not on file   Tobacco Use     Smoking status: Never Smoker     Smokeless tobacco: Never Used   Substance and Sexual Activity     Alcohol use: Yes     Alcohol/week: 2.5 standard drinks     Comment: armen     Drug use: No     Sexual activity: Yes     Partners: Male     Birth control/protection: I.U.D., None   Other Topics Concern     Parent/sibling w/ CABG, MI or angioplasty before 65F 55M? No   Social History Narrative     Not on file     Social Determinants of Health     Financial Resource Strain: Not on file   Food Insecurity: Not on file   Transportation Needs: Not on file   Physical Activity: Not on file   Stress: Not on file   Social Connections: Not on file   Intimate Partner Violence: Not on file   Housing " "Stability: Not on file       Past Medical History     Past Medical History:   Diagnosis Date     Anisometropia      Anxiety      Cervical intraepithelial neoplasia III      Depression     20s; on medications zoloft; prozac x 1 year     Depressive disorder      Dyslipidemia      Hypothyroidism      Metabolic syndrome      Morbid obesity (H)      MTHFR mutation     heterozygous     PCOS (polycystic ovarian syndrome)      Polycystic ovary syndrome      Pre-diabetes      Prothrombin gene mutation (H) 2017    heterozygous     Strabismus      Problem List     Patient Active Problem List   Diagnosis     Knee pain     Morbid obesity (H)     Anisometropia     Anxiety     Hyperlipidemia with target LDL less than 160     Hyperparathyroidism (H)     Hypothyroidism     Iron deficiency anemia     Low vitamin B12 level     Metabolic syndrome     MTHFR mutation     PCOS (polycystic ovarian syndrome)     Pre-diabetes     S/P bariatric surgery     Vitamin D deficiency     Morbid obesity with BMI of 50.0-59.9, adult (H)     ALFRED III (cervical intraepithelial neoplasia III)     Stress fracture of right foot, initial encounter     Plantar fasciitis     Medications     [unfilled]  Surgical History     Past Surgical History  She has a past surgical history that includes leep tx, cervical (); Excise ganglion wrist (Right); Eye surgery; Pr Lap Gastric Bypass/Pavithra-En-Y (N/A, 07/15/2015); Biopsy cervical, local excision, single/multiple (); and  Section (N/A, 2018).    Objective-Exam     Constitutional:  /70   Ht 1.525 m (5' 0.05\")   Wt 121.1 kg (266 lb 14.4 oz)   BMI 52.04 kg/m    [unfilled]   General:  Pleasant and in no acute distress   Eyes:  EOMI  ENT:  Airway patent    Neck:  Respiratory: Normal respiratory effort, no cough, .  CV:  RRR  Gastrointestinal: obese, nontender  Musculoskeletal: muscle mass WNL.  walker boot on right foot. Calf nontender to palpation.  Skin: color fair hair " red.,   Psychiatric: alert and oriented X3, mood and affect normal    Counseling     We reviewed the important post op bariatric recommendations:  -eating 3 meals daily  -eating protein first, getting >60gm protein daily  -eating slowly, chewing food well  -avoiding/limiting calorie containing beverages  -drinking water 15-30 minutes before or after meals  -limiting restaurant or cafeteria eating to twice a week or less    We discussed the importance of restorative sleep and stress management in maintaining a healthy weight.  We discussed the National Weight Control Registry healthy weight maintenance strategies and ways to optimize metabolism.  We discussed the importance of physical activity including cardiovascular and strength training in maintaining a healthier weight.    We discussed the importance of life-long vitamin supplementation and life-long  follow-up.    Leah was reminded that, to avoid marginal ulcers she should avoid tobacco at all, alcohol in excess, caffeine in excess, and NSAIDS (unless indicated for cardioprotection or othewise and opposed by a PPI).    Manoj Stover MD    [facility].  2022  12:42 PM  [phone] (clinic phone)  [phone] (fax)    No images are attached to the encounter.  Medical Decision Makin minutes spent on the date of the encounter doing chart review, history and exam, documentation and further activities per the note      Again, thank you for allowing me to participate in the care of your patient.        Sincerely,        Manoj Stover MD

## 2022-09-13 ENCOUNTER — PATIENT OUTREACH (OUTPATIENT)
Dept: FAMILY MEDICINE | Facility: CLINIC | Age: 42
End: 2022-09-13

## 2022-09-13 NOTE — LETTER
September 13, 2022      Leah John  [address] [place] [address]        Dear ,    This letter is to remind you that you are due for your follow-up Pap smear and Human Papillomavirus (HPV) test.    Please call [phone] to schedule your appointment at your earliest convenience.    If you have completed the appointment outside of the [facility] system, please have the records forwarded to our office. We will update your chart for your provider to review before your next annual wellness visit.     Thank you for choosing [facility]!      Sincerely,    Your [facility] Care Team

## 2022-10-05 ENCOUNTER — LAB (OUTPATIENT)
Dept: LAB | Facility: CLINIC | Age: 42
End: 2022-10-05
Payer: COMMERCIAL

## 2022-10-05 DIAGNOSIS — D50.9 IRON DEFICIENCY ANEMIA, UNSPECIFIED IRON DEFICIENCY ANEMIA TYPE: ICD-10-CM

## 2022-10-05 LAB
ERYTHROCYTE [DISTWIDTH] IN BLOOD BY AUTOMATED COUNT: 16.1 % (ref 10–15)
FERRITIN SERPL-MCNC: 15 NG/ML (ref 6–175)
HCT VFR BLD AUTO: 33.1 % (ref 35–47)
HGB BLD-MCNC: 10.5 G/DL (ref 11.7–15.7)
MCH RBC QN AUTO: 23.7 PG (ref 26.5–33)
MCHC RBC AUTO-ENTMCNC: 31.7 G/DL (ref 31.5–36.5)
MCV RBC AUTO: 75 FL (ref 78–100)
PLATELET # BLD AUTO: 366 10E3/UL (ref 150–450)
RBC # BLD AUTO: 4.43 10E6/UL (ref 3.8–5.2)
WBC # BLD AUTO: 6.9 10E3/UL (ref 4–11)

## 2022-10-05 PROCEDURE — 85027 COMPLETE CBC AUTOMATED: CPT

## 2022-10-05 PROCEDURE — 82728 ASSAY OF FERRITIN: CPT

## 2022-10-05 PROCEDURE — 36415 COLL VENOUS BLD VENIPUNCTURE: CPT

## 2022-10-12 ENCOUNTER — VIRTUAL VISIT (OUTPATIENT)
Dept: SURGERY | Facility: CLINIC | Age: 42
End: 2022-10-12
Payer: COMMERCIAL

## 2022-10-12 DIAGNOSIS — D50.9 IRON DEFICIENCY ANEMIA, UNSPECIFIED IRON DEFICIENCY ANEMIA TYPE: Primary | ICD-10-CM

## 2022-10-12 DIAGNOSIS — E66.01 MORBID OBESITY WITH BMI OF 50.0-59.9, ADULT (H): ICD-10-CM

## 2022-10-12 DIAGNOSIS — K91.2 POSTOPERATIVE MALABSORPTION: ICD-10-CM

## 2022-10-12 PROCEDURE — 99214 OFFICE O/P EST MOD 30 MIN: CPT | Mod: 95 | Performed by: EMERGENCY MEDICINE

## 2022-10-12 RX ORDER — PHENTERMINE HYDROCHLORIDE 37.5 MG/1
TABLET ORAL
Qty: 90 TABLET | Refills: 1 | Status: SHIPPED | OUTPATIENT
Start: 2022-10-12 | End: 2023-02-15 | Stop reason: ALTCHOICE

## 2022-10-12 NOTE — PROGRESS NOTES
Bariatric Follow Up Visit with a History of Previous Bariatric Surgery     Date of visit: 10/12/2022  Physician: Manoj Stover MD, MD  Primary Care Provider:  Andra Olsen  Leah John   42 year old  female    Date of Surgery: 7/15/15  Initial Weight: 311 lbs  Initial BMI: 59.7  Today's Weight:   Wt Readings from Last 1 Encounters:   08/01/22 121.1 kg (266 lb 14.4 oz)   254 lbs reported today.  Weight history:   Wt Readings from Last 4 Encounters:   08/01/22 121.1 kg (266 lb 14.4 oz)   06/29/22 120.2 kg (265 lb)   06/10/22 122.5 kg (270 lb)   03/23/22 122.5 kg (270 lb)      There is no height or weight on file to calculate BMI.    No menses since 2017.   Assessment and Plan     Assessment: Leah is a 42 year old year old female who is 7 years s/p  Pavithra en Y Gastric Bypass with Dr. Olivier.She'd started up phentermine therapy following having a child last year and stopping breast feeding this past winter. Since our March 2022 initiation of phentermine, she has had tolerated it well, down about 5 lbs earlier this year but was limited by stress fracture of foot this spring.  Since our August check in she's continued to lose weight, down 6% total body weight from June and tolerating phentermine. She may have some interest in GLP1 agonist therapy in the future but we'll stay on phentermine through the end of this year and re-evaluate at our follow up in 3-4 months.  Hemoglobin levels are up mildly but persistent low MCV and mild anemia remains (10.5g/dl Hgb, MCV 75 w/ ferritin of 15 (up from 2 in August) on 10/5/22 labs). She plans to have annual physical soon. Advised to check strength of her multivitamin as I suspect it may be less than 18mg/serving of iron.  Given her lack of menses it's unusual to need quite so much iron therapy but perhaps her absorption is more impaired vs average.  She lacks any sx of GI blood loss but if not correcting may be reasonable to evaluate hemoccult w/ follow up endoscopy if  "needed. No habits that would increase risk for ulcer/gastritis and she'll continue to avoid nicotine, excess alcohol/NSAIDs.    Leah John feels as if she has started to achieve the goals she hoped to accomplish through bariatric surgery and weight loss.    Encounter Diagnosis   Name Primary?     Morbid obesity with BMI of 50.0-59.9, adult (H)          Current Outpatient Medications:      phentermine (ADIPEX-P) 37.5 MG tablet, Half tablet up to one full tablet daily if tolerated, Disp: 90 tablet, Rfl: 1     calcium citrate (CITRACAL) 950 (200 Ca) MG tablet, Take 1 tablet by mouth 2 times daily, Disp: , Rfl:      ferrous fumarate 65 mg, [place]. FE,-Vitamin C 125 mg (VITRON-C)  MG TABS tablet, Use twice daily, 20minutes after meals for 30 days then reduce to once daily dosing thereafter for 2 months until gone., Disp: 120 tablet, Rfl: 0     Multiple Vitamin (MULTIVITAMIN ADULT PO), , Disp: , Rfl:      vitamin B-12 (CYANOCOBALAMIN) 500 MCG tablet, , Disp: , Rfl:      Vitamin D, Cholecalciferol, 25 MCG (1000 UT) TABS, , Disp: , Rfl:     Plan:  1. Improving anemia slowly. Continue twice daily complete multivitamin with iron and continued supplementation with Vitron C along with your vitamin D and B12 use.  Make sure your multivitamin is 18mg/serving and continue with 2 doses daily. Equate Adult  Complete or [facility] women's work well if used 2 daily.    2. Phentermine has been tolerated and efficacious and we'll continue therapy..    3. Continue mindful bariatric diet/protein focus and good hydration between meals.  Return in about 4 months (around 2/12/2023) for Follow up, with me.    Bariatric Surgery Review     Interim History/LifeChanges: foot has nearly healed, less pain than previous, overall\"fine\". Down to 254 lbs today. Has increased to one full tablet with some mild insomnia the first 10 days, now improved.  4 yo sleeping well.       Patient Concerns: none.  Appetite (1-10): improved w/ phentermine  GERD: " "rare and spice dependent..    Reviewed whether any need/indication for screening EGD today and we will defered.  Typically, a screening EGD is recommend post op year 2-3 if no symptoms to assess health of esophagus/bariatric surgery and sooner if difficult to control GERD or persistent pain/dysphagia sx despite behavior modification.    Medication changes: n/a    Vitamin Intake:   B-12   yes   MVI  yes, uses Centrum   Vitamin D  yes   Calcium   yes     Other  vitron C              LABS: \"Reviewed  No hematochezia/melena. Some constipation.       Most recent labs:  Lab Results   Component Value Date    WBC 6.9 10/05/2022    HGB 10.5 (L) 10/05/2022    HCT 33.1 (L) 10/05/2022    MCV 75 (L) 10/05/2022     10/05/2022     Lab Results   Component Value Date    CHOL 190 08/17/2021     Lab Results   Component Value Date    HDL 53 08/17/2021     No components found for: LDLCALC  Lab Results   Component Value Date    TRIG 85 08/17/2021     No results found for: CHOLHDL  Lab Results   Component Value Date    ALT 11 03/29/2022    AST 16 03/29/2022    ALKPHOS 96 03/29/2022     No results found for: HGBA1C  Lab Results   Component Value Date    B12 738 03/29/2022     No components found for: VITDT1  Lab Results   Component Value Date    JAMAR 15 10/05/2022     Lab Results   Component Value Date    PTHI 54 07/26/2022     Lab Results   Component Value Date    ZN 78.4 03/29/2022     Lab Results   Component Value Date    VIB1WB 101 03/29/2022     Lab Results   Component Value Date    TSH 4.57 03/29/2022     No results found for: TEST    Habits:  Tobacco/Nicotine/THC exposure? no   NSAID use? no   Alcohol use? no   Caffeine Habits? n/a         Social History     Social History     Socioeconomic History     Marital status:      Spouse name: Not on file     Number of children: Not on file     Years of education: Not on file     Highest education level: Not on file   Occupational History     Not on file   Tobacco Use     " Smoking status: Never     Smokeless tobacco: Never   Substance and Sexual Activity     Alcohol use: Yes     Alcohol/week: 2.5 standard drinks     Comment: armen     Drug use: No     Sexual activity: Yes     Partners: Male     Birth control/protection: I.U.D., None   Other Topics Concern     Parent/sibling w/ CABG, MI or angioplasty before 65F 55M? No   Social History Narrative     Not on file     Social Determinants of Health     Financial Resource Strain: Not on file   Food Insecurity: Not on file   Transportation Needs: Not on file   Physical Activity: Not on file   Stress: Not on file   Social Connections: Not on file   Intimate Partner Violence: Not on file   Housing Stability: Not on file       Past Medical History     Past Medical History:   Diagnosis Date     Anisometropia      Anxiety      Cervical intraepithelial neoplasia III 2013     Depression     20s; on medications zoloft; prozac x 1 year     Depressive disorder      Dyslipidemia 2014     Hypothyroidism      Metabolic syndrome      Morbid obesity (H)      MTHFR mutation     heterozygous     PCOS (polycystic ovarian syndrome)      Polycystic ovary syndrome      Pre-diabetes      Prothrombin gene mutation (H) 01/17/2017    heterozygous     Strabismus      Problem List     Patient Active Problem List   Diagnosis     Knee pain     Morbid obesity (H)     Anisometropia     Anxiety     Hyperlipidemia with target LDL less than 160     Hyperparathyroidism (H)     Hypothyroidism     Iron deficiency anemia     Low vitamin B12 level     Metabolic syndrome     MTHFR mutation     PCOS (polycystic ovarian syndrome)     Pre-diabetes     S/P bariatric surgery     Vitamin D deficiency     Morbid obesity with BMI of 50.0-59.9, adult (H)     ALFRED III (cervical intraepithelial neoplasia III)     Stress fracture of right foot, initial encounter     Plantar fasciitis     Medications     [unfilled]  Surgical History     Past Surgical History  She has a past surgical  history that includes leep tx, cervical (); Excise ganglion wrist (Right); Eye surgery; Pr Lap Gastric Bypass/Pavithra-En-Y (N/A, 07/15/2015); Biopsy cervical, local excision, single/multiple (); and  Section (N/A, 2018).    Objective-Exam     Constitutional:  There were no vitals taken for this visit.  [unfilled]   General:  Pleasant and in no acute distress   Eyes:  EOMI  ENT:  Airway patent    Neck:  Respiratory: Normal respiratory effort, no cough, .  CV:  n/a  Gastrointestinal: n/a  Musculoskeletal: muscle mass WNL  Skin: color fair/red hair.,   Psychiatric: alert and oriented X3, mood and affect normal    Counseling     We reviewed the important post op bariatric recommendations:  -eating 3 meals daily  -eating protein first, getting >60gm protein daily  -eating slowly, chewing food well  -avoiding/limiting calorie containing beverages  -drinking water 15-30 minutes before or after meals  -limiting restaurant or cafeteria eating to twice a week or less    We discussed the importance of restorative sleep and stress management in maintaining a healthy weight.  We discussed the National Weight Control Registry healthy weight maintenance strategies and ways to optimize metabolism.  We discussed the importance of physical activity including cardiovascular and strength training in maintaining a healthier weight.    We discussed the importance of life-long vitamin supplementation and life-long  follow-up.    Leah was reminded that, to avoid marginal ulcers she should avoid tobacco at all, alcohol in excess, caffeine in excess, and NSAIDS (unless indicated for cardioprotection or othewise and opposed by a PPI).    Manoj Stover MD    [facility].  10/12/2022  8:37 AM  [phone] (clinic phone)  [phone] (fax)    No images are attached to the encounter.  Medical Decision Makin minutes spent on the date of the encounter doing chart review, history and exam,  documentation and further activities per the note

## 2022-10-12 NOTE — PROGRESS NOTES
Leah John is 42 year old  female who presents for a billable video visit today.    How would you like to obtain your AVS? MyChart  If dropped from the video visit, the video invitation should be resent by: Text to cell phone: [phone]  Will anyone else be joining your video visit? No      Video Start Time: 10:59 AM    Are there any specific questions or needs that you would like addressed at your visit today? Return MWM - med change    Provider Notes: see my note      Video-Visit Details    Type of service:  Video Visit    Video End Time (time video stopped): 11:33 AM  Originating Location (pt. Location): Home    Distant Location (provider location):  [facility]     Platform used for Video Visit: MyCadbox

## 2022-10-12 NOTE — LETTER
10/12/2022         RE: Leah John  [address] [place] [address]        Dear Colleague,    Thank you for referring your patient, Leah John, to the [facility] [place]. Please see a copy of my visit note below.    Bariatric Follow Up Visit with a History of Previous Bariatric Surgery     Date of visit: 10/12/2022  Physician: Manoj Stover MD, MD  Primary Care Provider:  Andra Olsen  Leah Duranjane   42 year old  female    Date of Surgery: 7/15/15  Initial Weight: 311 lbs  Initial BMI: 59.7  Today's Weight:   Wt Readings from Last 1 Encounters:   08/01/22 121.1 kg (266 lb 14.4 oz)   254 lbs reported today.  Weight history:   Wt Readings from Last 4 Encounters:   08/01/22 121.1 kg (266 lb 14.4 oz)   06/29/22 120.2 kg (265 lb)   06/10/22 122.5 kg (270 lb)   03/23/22 122.5 kg (270 lb)      There is no height or weight on file to calculate BMI.    No menses since 2017.   Assessment and Plan     Assessment: Leah is a 42 year old year old female who is 7 years s/p  Pavithra en Y Gastric Bypass with Dr. Olivier.She'd started up phentermine therapy following having a child last year and stopping breast feeding this past winter. Since our March 2022 initiation of phentermine, she has had tolerated it well, down about 5 lbs earlier this year but was limited by stress fracture of foot this spring.  Since our August check in she's continued to lose weight, down 6% total body weight from June and tolerating phentermine. She may have some interest in GLP1 agonist therapy in the future but we'll stay on phentermine through the end of this year and re-evaluate at our follow up in 3-4 months.  Hemoglobin levels are up mildly but persistent low MCV and mild anemia remains (10.5g/dl Hgb, MCV 75 w/ ferritin of 15 (up from 2 in August) on 10/5/22 labs). She plans to have annual physical soon. Advised to check strength of her multivitamin as I suspect it may  be less than 18mg/serving of iron.  Given her lack of menses it's unusual to need quite so much iron therapy but perhaps her absorption is more impaired vs average.  She lacks any sx of GI blood loss but if not correcting may be reasonable to evaluate hemoccult w/ follow up endoscopy if needed. No habits that would increase risk for ulcer/gastritis and she'll continue to avoid nicotine, excess alcohol/NSAIDs.    Leah John feels as if she has started to achieve the goals she hoped to accomplish through bariatric surgery and weight loss.    Encounter Diagnosis   Name Primary?     Morbid obesity with BMI of 50.0-59.9, adult (H)          Current Outpatient Medications:      phentermine (ADIPEX-P) 37.5 MG tablet, Half tablet up to one full tablet daily if tolerated, Disp: 90 tablet, Rfl: 1     calcium citrate (CITRACAL) 950 (200 Ca) MG tablet, Take 1 tablet by mouth 2 times daily, Disp: , Rfl:      ferrous fumarate 65 mg, [place]. FE,-Vitamin C 125 mg (VITRON-C)  MG TABS tablet, Use twice daily, 20minutes after meals for 30 days then reduce to once daily dosing thereafter for 2 months until gone., Disp: 120 tablet, Rfl: 0     Multiple Vitamin (MULTIVITAMIN ADULT PO), , Disp: , Rfl:      vitamin B-12 (CYANOCOBALAMIN) 500 MCG tablet, , Disp: , Rfl:      Vitamin D, Cholecalciferol, 25 MCG (1000 UT) TABS, , Disp: , Rfl:     Plan:  1. Improving anemia slowly. Continue twice daily complete multivitamin with iron and continued supplementation with Vitron C along with your vitamin D and B12 use.  Make sure your multivitamin is 18mg/serving and continue with 2 doses daily. Equate Adult  Complete or [facility] women's work well if used 2 daily.    2. Phentermine has been tolerated and efficacious and we'll continue therapy..    3. Continue mindful bariatric diet/protein focus and good hydration between meals.  Return in about 4 months (around 2/12/2023) for Follow up, with me.    Bariatric Surgery Review     Interim  "History/LifeChanges: foot has nearly healed, less pain than previous, overall\"fine\". Down to 254 lbs today. Has increased to one full tablet with some mild insomnia the first 10 days, now improved.  6 yo sleeping well.       Patient Concerns: none.  Appetite (1-10): improved w/ phentermine  GERD: rare and spice dependent..    Reviewed whether any need/indication for screening EGD today and we will defered.  Typically, a screening EGD is recommend post op year 2-3 if no symptoms to assess health of esophagus/bariatric surgery and sooner if difficult to control GERD or persistent pain/dysphagia sx despite behavior modification.    Medication changes: n/a    Vitamin Intake:   B-12   yes   MVI  yes, uses Centrum   Vitamin D  yes   Calcium   yes     Other  vitron C              LABS: \"Reviewed  No hematochezia/melena. Some constipation.       Most recent labs:  Lab Results   Component Value Date    WBC 6.9 10/05/2022    HGB 10.5 (L) 10/05/2022    HCT 33.1 (L) 10/05/2022    MCV 75 (L) 10/05/2022     10/05/2022     Lab Results   Component Value Date    CHOL 190 08/17/2021     Lab Results   Component Value Date    HDL 53 08/17/2021     No components found for: LDLCALC  Lab Results   Component Value Date    TRIG 85 08/17/2021     No results found for: CHOLHDL  Lab Results   Component Value Date    ALT 11 03/29/2022    AST 16 03/29/2022    ALKPHOS 96 03/29/2022     No results found for: HGBA1C  Lab Results   Component Value Date    B12 738 03/29/2022     No components found for: VITDT1  Lab Results   Component Value Date    JAMAR 15 10/05/2022     Lab Results   Component Value Date    PTHI 54 07/26/2022     Lab Results   Component Value Date    ZN 78.4 03/29/2022     Lab Results   Component Value Date    VIB1WB 101 03/29/2022     Lab Results   Component Value Date    TSH 4.57 03/29/2022     No results found for: TEST    Habits:  Tobacco/Nicotine/THC exposure? no   NSAID use? no   Alcohol use? no   Caffeine Habits? n/a "         Social History     Social History     Socioeconomic History     Marital status:      Spouse name: Not on file     Number of children: Not on file     Years of education: Not on file     Highest education level: Not on file   Occupational History     Not on file   Tobacco Use     Smoking status: Never     Smokeless tobacco: Never   Substance and Sexual Activity     Alcohol use: Yes     Alcohol/week: 2.5 standard drinks     Comment: armen     Drug use: No     Sexual activity: Yes     Partners: Male     Birth control/protection: I.U.D., None   Other Topics Concern     Parent/sibling w/ CABG, MI or angioplasty before 65F 55M? No   Social History Narrative     Not on file     Social Determinants of Health     Financial Resource Strain: Not on file   Food Insecurity: Not on file   Transportation Needs: Not on file   Physical Activity: Not on file   Stress: Not on file   Social Connections: Not on file   Intimate Partner Violence: Not on file   Housing Stability: Not on file       Past Medical History     Past Medical History:   Diagnosis Date     Anisometropia      Anxiety      Cervical intraepithelial neoplasia III 2013     Depression     20s; on medications zoloft; prozac x 1 year     Depressive disorder      Dyslipidemia 2014     Hypothyroidism      Metabolic syndrome      Morbid obesity (H)      MTHFR mutation     heterozygous     PCOS (polycystic ovarian syndrome)      Polycystic ovary syndrome      Pre-diabetes      Prothrombin gene mutation (H) 01/17/2017    heterozygous     Strabismus      Problem List     Patient Active Problem List   Diagnosis     Knee pain     Morbid obesity (H)     Anisometropia     Anxiety     Hyperlipidemia with target LDL less than 160     Hyperparathyroidism (H)     Hypothyroidism     Iron deficiency anemia     Low vitamin B12 level     Metabolic syndrome     MTHFR mutation     PCOS (polycystic ovarian syndrome)     Pre-diabetes     S/P bariatric surgery     Vitamin D  deficiency     Morbid obesity with BMI of 50.0-59.9, adult (H)     ALFRED III (cervical intraepithelial neoplasia III)     Stress fracture of right foot, initial encounter     Plantar fasciitis     Medications     [unfilled]  Surgical History     Past Surgical History  She has a past surgical history that includes leep tx, cervical (); Excise ganglion wrist (Right); Eye surgery; Pr Lap Gastric Bypass/Pavithra-En-Y (N/A, 07/15/2015); Biopsy cervical, local excision, single/multiple (); and  Section (N/A, 2018).    Objective-Exam     Constitutional:  There were no vitals taken for this visit.  [unfilled]   General:  Pleasant and in no acute distress   Eyes:  EOMI  ENT:  Airway patent    Neck:  Respiratory: Normal respiratory effort, no cough, .  CV:  n/a  Gastrointestinal: n/a  Musculoskeletal: muscle mass WNL  Skin: color fair/red hair.,   Psychiatric: alert and oriented X3, mood and affect normal    Counseling     We reviewed the important post op bariatric recommendations:  -eating 3 meals daily  -eating protein first, getting >60gm protein daily  -eating slowly, chewing food well  -avoiding/limiting calorie containing beverages  -drinking water 15-30 minutes before or after meals  -limiting restaurant or cafeteria eating to twice a week or less    We discussed the importance of restorative sleep and stress management in maintaining a healthy weight.  We discussed the National Weight Control Registry healthy weight maintenance strategies and ways to optimize metabolism.  We discussed the importance of physical activity including cardiovascular and strength training in maintaining a healthier weight.    We discussed the importance of life-long vitamin supplementation and life-long  follow-up.    Leah was reminded that, to avoid marginal ulcers she should avoid tobacco at all, alcohol in excess, caffeine in excess, and NSAIDS (unless indicated for cardioprotection or othewise and opposed by a  PPI).    Manoj Stover MD    [facility].  10/12/2022  8:37 AM  [phone] (clinic phone)  [phone] (fax)    No images are attached to the encounter.  Medical Decision Makin minutes spent on the date of the encounter doing chart review, history and exam, documentation and further activities per the note    Leah John is 42 year old  female who presents for a billable video visit today.    How would you like to obtain your AVS? MyChart  If dropped from the video visit, the video invitation should be resent by: Text to cell phone: [phone]  Will anyone else be joining your video visit? No      Video Start Time: 10:59 AM    Are there any specific questions or needs that you would like addressed at your visit today? Return MWM - med change    Provider Notes: see my note      Video-Visit Details    Type of service:  Video Visit    Video End Time (time video stopped): 11:33 AM  Originating Location (pt. Location): Home    Distant Location (provider location):  [facility] [place]     Platform used for Video Visit: [facility]      Again, thank you for allowing me to participate in the care of your patient.        Sincerely,        Manoj Stover MD

## 2022-10-29 ASSESSMENT — ENCOUNTER SYMPTOMS
COUGH: 0
DYSURIA: 0
NAUSEA: 0
PALPITATIONS: 0
ABDOMINAL PAIN: 0
FREQUENCY: 0
SORE THROAT: 0
HEADACHES: 0
CHILLS: 0
FEVER: 0
WEAKNESS: 0
DIZZINESS: 0
MYALGIAS: 0
CONSTIPATION: 0
JOINT SWELLING: 0
HEMATOCHEZIA: 0
EYE PAIN: 0
HEMATURIA: 0
SHORTNESS OF BREATH: 0
HEARTBURN: 0
DIARRHEA: 0
BREAST MASS: 0
ARTHRALGIAS: 0
NERVOUS/ANXIOUS: 0
PARESTHESIAS: 0

## 2022-10-31 ENCOUNTER — OFFICE VISIT (OUTPATIENT)
Dept: FAMILY MEDICINE | Facility: CLINIC | Age: 42
End: 2022-10-31
Payer: COMMERCIAL

## 2022-10-31 VITALS
SYSTOLIC BLOOD PRESSURE: 120 MMHG | HEIGHT: 61 IN | OXYGEN SATURATION: 97 % | WEIGHT: 250 LBS | DIASTOLIC BLOOD PRESSURE: 72 MMHG | HEART RATE: 84 BPM | BODY MASS INDEX: 47.2 KG/M2

## 2022-10-31 DIAGNOSIS — E78.5 HYPERLIPIDEMIA WITH TARGET LDL LESS THAN 160: ICD-10-CM

## 2022-10-31 DIAGNOSIS — Z00.00 ENCOUNTER FOR ROUTINE HISTORY AND PHYSICAL EXAM IN FEMALE: Primary | ICD-10-CM

## 2022-10-31 DIAGNOSIS — E03.9 HYPOTHYROIDISM, UNSPECIFIED TYPE: ICD-10-CM

## 2022-10-31 DIAGNOSIS — Z13.220 LIPID SCREENING: ICD-10-CM

## 2022-10-31 DIAGNOSIS — Z12.4 CERVICAL CANCER SCREENING: ICD-10-CM

## 2022-10-31 DIAGNOSIS — E66.01 MORBID OBESITY (H): ICD-10-CM

## 2022-10-31 DIAGNOSIS — E21.3 HYPERPARATHYROIDISM (H): ICD-10-CM

## 2022-10-31 LAB
CHOLEST SERPL-MCNC: 180 MG/DL
HDLC SERPL-MCNC: 54 MG/DL
LDLC SERPL CALC-MCNC: 110 MG/DL
NONHDLC SERPL-MCNC: 126 MG/DL
TRIGL SERPL-MCNC: 78 MG/DL

## 2022-10-31 PROCEDURE — 99396 PREV VISIT EST AGE 40-64: CPT | Mod: 25 | Performed by: NURSE PRACTITIONER

## 2022-10-31 PROCEDURE — 36415 COLL VENOUS BLD VENIPUNCTURE: CPT | Performed by: NURSE PRACTITIONER

## 2022-10-31 PROCEDURE — G0145 SCR C/V CYTO,THINLAYER,RESCR: HCPCS | Performed by: NURSE PRACTITIONER

## 2022-10-31 PROCEDURE — 90715 TDAP VACCINE 7 YRS/> IM: CPT | Performed by: NURSE PRACTITIONER

## 2022-10-31 PROCEDURE — 90471 IMMUNIZATION ADMIN: CPT | Performed by: NURSE PRACTITIONER

## 2022-10-31 PROCEDURE — 87624 HPV HI-RISK TYP POOLED RSLT: CPT | Performed by: NURSE PRACTITIONER

## 2022-10-31 PROCEDURE — 80061 LIPID PANEL: CPT | Performed by: NURSE PRACTITIONER

## 2022-10-31 NOTE — PROGRESS NOTES
Assessment and Plan:    Encounter for routine history and physical exam in female  Recommend consuming a healthy diet and exercising.  Tetanus vaccine provided.  She is up-to-date on influenza and COVID vaccines.  She has a mammogram scheduled.    Cervical cancer screening  - Pap Screen with HPV - recommended age 30 - 65 years    Hypothyroidism, unspecified type  She is not currently taking medication.  This is monitored by the bariatric center.    Hyperlipidemia with target LDL less than 160  We will check lipid cascade.  She is not currently taking medication.  -Lipid screening    Hyperparathyroidism (H)  Parathyroid labs continue to remain elevated despite normal vitamin D levels.  May consider referral to endocrinology if this persists.  This is monitored by bariatric center.    Morbid obesity (H)  This is contributing to hyperlipidemia.  Recommend consuming a healthy diet and exercising.  She is seeing the bariatric center.      Subjective:     Leah is a 42 year old female presenting to the clinic for a female physical.     LMP: 2017 (has IUD)   Hx of abnormal pap smear: 21 normal, positive high risk HPV; colposcopy 21 showed: FOCAL ATYPICAL SQUAMOUS METAPLASIA WITH KOILOCYTOSIS, SUSPICIOUS BUT NOT DIAGNOSTIC FOR HPV CYTOPATHIC EFFECT  NEGATIVE FOR HIGH-GRADE EPITHELIAL DYSPLASIA   Last pap smear:21 normal, positive high risk HPV  Perform self-breast exams: none   Vaginal discharge or irritation: none   Sexually active: yes,  for 8 years   Contraception: IUD   Concerns for STDs: none   Previous pregnancies:one pregnancy (   Four year old daughter     Patient has a history of Pavithra-en-Y gastric bypass.  She sees the bariatric center and is receiving phentermine.  She has a history of hyperlipidemia and is not currently taking medication.  She has a history of prediabetes with an A1c of 5.5% on 3/29/2022.  She has a history of hypothyroidism and is not currently taking  medication.  Last TSH was 4.57 on 3/29/2022.  Parathyroid is monitored due to hyperparathyroidism.  Parathyroid was 120 on 3/29/2022.  Vitamin D was 30.       Review of systems:  I performed a 10 point review of systems.  All pertinent positives and negatives are noted in the HPI. All others are negative.     Allergies   Allergen Reactions     Nsaids      Hx of gastric bypass and should avoid due to ulcer/gastritis risks.     Sulfa Drugs Unknown       Current Outpatient Medications   Medication     calcium citrate (CITRACAL) 950 (200 Ca) MG tablet     ferrous fumarate 65 mg, [place]. FE,-Vitamin C 125 mg (VITRON-C)  MG TABS tablet     Multiple Vitamin (MULTIVITAMIN ADULT PO)     phentermine (ADIPEX-P) 37.5 MG tablet     vitamin B-12 (CYANOCOBALAMIN) 500 MCG tablet     Vitamin D (Cholecalciferol) 25 MCG (1000 UT) TABS     No current facility-administered medications for this visit.       Social History     Socioeconomic History     Marital status:      Spouse name: Not on file     Number of children: Not on file     Years of education: Not on file     Highest education level: Not on file   Occupational History     Not on file   Tobacco Use     Smoking status: Never     Smokeless tobacco: Never   Substance and Sexual Activity     Alcohol use: Yes     Alcohol/week: 2.5 standard drinks     Comment: armen     Drug use: No     Sexual activity: Yes     Partners: Male     Birth control/protection: I.U.D., None   Other Topics Concern     Parent/sibling w/ CABG, MI or angioplasty before 65F 55M? No   Social History Narrative     Not on file     Social Determinants of Health     Financial Resource Strain: Not on file   Food Insecurity: Not on file   Transportation Needs: Not on file   Physical Activity: Not on file   Stress: Not on file   Social Connections: Not on file   Intimate Partner Violence: Not on file   Housing Stability: Not on file       Past Medical History:   Diagnosis Date     Anisometropia      Anxiety   "    Cervical intraepithelial neoplasia III      Depression     20s; on medications zoloft; prozac x 1 year     Depressive disorder      Dyslipidemia 2014     Hypothyroidism      Metabolic syndrome      Morbid obesity (H)      MTHFR mutation     heterozygous     PCOS (polycystic ovarian syndrome)      Polycystic ovary syndrome      Pre-diabetes      Prothrombin gene mutation (H) 2017    heterozygous     Strabismus        Family History   Problem Relation Age of Onset     Obesity Mother      Hypertension Mother      Substance Abuse Mother      Depression Father      Hypertension Father      Substance Abuse Father      Depression Sister      Anxiety Disorder Sister      Depression Brother      Diabetes Brother         insulin     Depression Maternal Grandmother      Substance Abuse Maternal Grandmother      Depression Maternal Grandfather      Depression Paternal Grandmother      Depression Paternal Grandfather      Cancer Paternal Grandfather         found late, mets, possible lung     Substance Abuse Paternal Grandfather      Depression Sister      Anesthesia Reaction No family hx of        Past Surgical History:   Procedure Laterality Date     BIOPSY CERVICAL, LOCAL EXCISION, SINGLE/MULTIPLE      ALFRED III      SECTION N/A 2018    Procedure: PRIMARY  SECTION;  Surgeon: Becky Rg MD;  Location: [facility] L+D OR;  Service:      EXCISE GANGLION WRIST Right      EYE SURGERY      x3; as a child     LEEP TX, CERVICAL       AL LAP GASTRIC BYPASS/CRISTIANE-EN-Y N/A 07/15/2015    Mark Olivier MD; [facility]. Initial Weight 311 lbs, BMI 59.7       Objective:     /72   Pulse 84   Ht 1.537 m (5' 0.5\")   Wt 113.4 kg (250 lb)   SpO2 97%   BMI 48.02 kg/m      Patient is alert, no obvious distress.   Skin: Warm, dry.  No rashes or lesions. Skin turgor rapid return.   HEENT:  Eyes normal.  Ears normal.  Nose patent, mucosa pink.  Oropharynx mucosa pink, no lesions or tonsil " enlargement.   Neck:  Supple, without lymphadenopathy, bruits, JVD. Thyroid normal texture and size.    Lungs:  Clear to auscultation.  No wheezing, rales noted.  Respirations even and unlabored.   Heart:  Regular rate and rhythm.  No murmurs.   Breasts:  Normal.  No surrounding adenopathy.   Abdomen: Soft, nontender.  No organomegaly.  Bowel sounds normoactive.  No guarding or masses noted.   :  External genitalia normal.  Normal vaginal mucosa.  Cervix no lesions or cervical motion tenderness. IUD strings are in place and protruding from cervical os.   Musculoskeletal:  Full ROM of extremities.  Muscle strength equal +5/5.   Neurological:  Cranial nerves 2-12 intact.             Answers for HPI/ROS submitted by the patient on 10/29/2022  Frequency of exercise:: None  Getting at least 3 servings of Calcium per day:: Yes  Diet:: Other  Taking medications regularly:: No  Medication side effects:: None  Bi-annual eye exam:: Yes  Dental care twice a year:: Yes  Sleep apnea or symptoms of sleep apnea:: None  abdominal pain: No  Blood in stool: No  Blood in urine: No  chest pain: No  chills: No  congestion: No  constipation: No  cough: No  diarrhea: No  dizziness: No  ear pain: No  eye pain: No  nervous/anxious: No  fever: No  frequency: No  genital sores: No  headaches: No  hearing loss: No  heartburn: No  arthralgias: No  joint swelling: No  peripheral edema: No  mood changes: No  myalgias: No  nausea: No  dysuria: No  palpitations: No  Skin sensation changes: No  sore throat: No  urgency: No  rash: No  shortness of breath: No  visual disturbance: No  weakness: No  pelvic pain: No  vaginal bleeding: No  vaginal discharge: No  tenderness: No  breast mass: No  breast discharge: No  Additional concerns today:: Yes  Barriers to taking medications:: None

## 2022-11-03 LAB
BKR LAB AP GYN ADEQUACY: NORMAL
BKR LAB AP GYN INTERPRETATION: NORMAL
BKR LAB AP HPV REFLEX: NORMAL
BKR LAB AP PREVIOUS ABNL DX: NORMAL
BKR LAB AP PREVIOUS ABNORMAL: NORMAL
PATH REPORT.COMMENTS IMP SPEC: NORMAL
PATH REPORT.COMMENTS IMP SPEC: NORMAL
PATH REPORT.RELEVANT HX SPEC: NORMAL

## 2022-11-07 LAB
HUMAN PAPILLOMA VIRUS 16 DNA: NEGATIVE
HUMAN PAPILLOMA VIRUS 18 DNA: NEGATIVE
HUMAN PAPILLOMA VIRUS FINAL DIAGNOSIS: NORMAL
HUMAN PAPILLOMA VIRUS OTHER HR: NEGATIVE

## 2022-11-09 ENCOUNTER — ANCILLARY PROCEDURE (OUTPATIENT)
Dept: MAMMOGRAPHY | Facility: CLINIC | Age: 42
End: 2022-11-09
Attending: FAMILY MEDICINE
Payer: COMMERCIAL

## 2022-11-09 DIAGNOSIS — Z12.31 VISIT FOR SCREENING MAMMOGRAM: ICD-10-CM

## 2022-11-09 PROCEDURE — 77067 SCR MAMMO BI INCL CAD: CPT

## 2022-12-16 ENCOUNTER — E-VISIT (OUTPATIENT)
Dept: FAMILY MEDICINE | Facility: CLINIC | Age: 42
End: 2022-12-16
Payer: COMMERCIAL

## 2022-12-16 DIAGNOSIS — F33.9 EPISODE OF RECURRENT MAJOR DEPRESSIVE DISORDER, UNSPECIFIED DEPRESSION EPISODE SEVERITY (H): Primary | ICD-10-CM

## 2022-12-16 PROCEDURE — 99207 PR NON-BILLABLE SERV PER CHARTING: CPT | Performed by: FAMILY MEDICINE

## 2022-12-16 ASSESSMENT — ANXIETY QUESTIONNAIRES
6. BECOMING EASILY ANNOYED OR IRRITABLE: NEARLY EVERY DAY
GAD7 TOTAL SCORE: 10
3. WORRYING TOO MUCH ABOUT DIFFERENT THINGS: MORE THAN HALF THE DAYS
2. NOT BEING ABLE TO STOP OR CONTROL WORRYING: SEVERAL DAYS
6. BECOMING EASILY ANNOYED OR IRRITABLE: NEARLY EVERY DAY
5. BEING SO RESTLESS THAT IT IS HARD TO SIT STILL: NOT AT ALL
GAD7 TOTAL SCORE: 10
GAD7 TOTAL SCORE: 12
GAD7 TOTAL SCORE: 10
IF YOU CHECKED OFF ANY PROBLEMS ON THIS QUESTIONNAIRE, HOW DIFFICULT HAVE THESE PROBLEMS MADE IT FOR YOU TO DO YOUR WORK, TAKE CARE OF THINGS AT HOME, OR GET ALONG WITH OTHER PEOPLE: SOMEWHAT DIFFICULT
7. FEELING AFRAID AS IF SOMETHING AWFUL MIGHT HAPPEN: SEVERAL DAYS
8. IF YOU CHECKED OFF ANY PROBLEMS, HOW DIFFICULT HAVE THESE MADE IT FOR YOU TO DO YOUR WORK, TAKE CARE OF THINGS AT HOME, OR GET ALONG WITH OTHER PEOPLE?: SOMEWHAT DIFFICULT
4. TROUBLE RELAXING: MORE THAN HALF THE DAYS
4. TROUBLE RELAXING: SEVERAL DAYS
7. FEELING AFRAID AS IF SOMETHING AWFUL MIGHT HAPPEN: SEVERAL DAYS
GAD7 TOTAL SCORE: 12
1. FEELING NERVOUS, ANXIOUS, OR ON EDGE: MORE THAN HALF THE DAYS
2. NOT BEING ABLE TO STOP OR CONTROL WORRYING: SEVERAL DAYS
7. FEELING AFRAID AS IF SOMETHING AWFUL MIGHT HAPPEN: SEVERAL DAYS
3. WORRYING TOO MUCH ABOUT DIFFERENT THINGS: MORE THAN HALF THE DAYS
GAD7 TOTAL SCORE: 12
5. BEING SO RESTLESS THAT IT IS HARD TO SIT STILL: NOT AT ALL
7. FEELING AFRAID AS IF SOMETHING AWFUL MIGHT HAPPEN: SEVERAL DAYS
8. IF YOU CHECKED OFF ANY PROBLEMS, HOW DIFFICULT HAVE THESE MADE IT FOR YOU TO DO YOUR WORK, TAKE CARE OF THINGS AT HOME, OR GET ALONG WITH OTHER PEOPLE?: SOMEWHAT DIFFICULT
1. FEELING NERVOUS, ANXIOUS, OR ON EDGE: NEARLY EVERY DAY

## 2022-12-16 ASSESSMENT — PATIENT HEALTH QUESTIONNAIRE - PHQ9
SUM OF ALL RESPONSES TO PHQ QUESTIONS 1-9: 9
SUM OF ALL RESPONSES TO PHQ QUESTIONS 1-9: 11
SUM OF ALL RESPONSES TO PHQ QUESTIONS 1-9: 11
10. IF YOU CHECKED OFF ANY PROBLEMS, HOW DIFFICULT HAVE THESE PROBLEMS MADE IT FOR YOU TO DO YOUR WORK, TAKE CARE OF THINGS AT HOME, OR GET ALONG WITH OTHER PEOPLE: SOMEWHAT DIFFICULT

## 2022-12-16 NOTE — PATIENT INSTRUCTIONS
Thank you for your E-visit.  It is recommended that you schedule a visit with a healthcare provider to discuss treatment of depression.  This can be an in person visit or a virtual visit.

## 2022-12-17 ASSESSMENT — PATIENT HEALTH QUESTIONNAIRE - PHQ9: SUM OF ALL RESPONSES TO PHQ QUESTIONS 1-9: 11

## 2022-12-17 ASSESSMENT — ANXIETY QUESTIONNAIRES: GAD7 TOTAL SCORE: 12

## 2022-12-19 ENCOUNTER — VIRTUAL VISIT (OUTPATIENT)
Dept: FAMILY MEDICINE | Facility: CLINIC | Age: 42
End: 2022-12-19
Payer: COMMERCIAL

## 2022-12-19 DIAGNOSIS — F41.9 ANXIETY: Primary | ICD-10-CM

## 2022-12-19 PROCEDURE — 99213 OFFICE O/P EST LOW 20 MIN: CPT | Mod: 95 | Performed by: PHYSICIAN ASSISTANT

## 2022-12-19 ASSESSMENT — PATIENT HEALTH QUESTIONNAIRE - PHQ9
SUM OF ALL RESPONSES TO PHQ QUESTIONS 1-9: 9
SUM OF ALL RESPONSES TO PHQ QUESTIONS 1-9: 12
SUM OF ALL RESPONSES TO PHQ QUESTIONS 1-9: 12
10. IF YOU CHECKED OFF ANY PROBLEMS, HOW DIFFICULT HAVE THESE PROBLEMS MADE IT FOR YOU TO DO YOUR WORK, TAKE CARE OF THINGS AT HOME, OR GET ALONG WITH OTHER PEOPLE: SOMEWHAT DIFFICULT

## 2022-12-19 ASSESSMENT — ANXIETY QUESTIONNAIRES: GAD7 TOTAL SCORE: 10

## 2022-12-19 NOTE — PATIENT INSTRUCTIONS
naturalnews.com printable article  Originally published March 14 2012  Natural menopause treatment with herbs, food remedies and homeopathy relieves cause of symptoms  by ZACKARY Penn     (NaturalNews) Not all women experience symptoms of menopause; however, for those that do, natural menopause treatment can help reduce a wide range of symptoms that may bring misery to a woman's senior years. Menopause is the result of changing hormones, specifically estrogen and progesterone. The [facility] reports that 40 percent or more of women experience significant symptoms. As the body rebalances itself for the post-childbearing years, symptoms such as night sweats and hot flashes, vaginal dryness, bleeding, irritability, moodiness and depression can appear. Natural menopause treatment with herbs, food remedies and homeopathic remedies can offer relief in place of dangerous HRT.    Treatment with herbs  Black Cohosh contains a plant-based estrogen that helps regulate hormones, offering relief to a wide range of women with menopause symptoms, such as vaginal dryness, itching, moodiness, depression and hot flashes.    Wild Yam is an excellent natural menopause treatment for regulating hormones, especially progesterone. This hormone plays a role in controlling moods, and is used to help with depression, moodiness, irritability and anger.    Ginseng has been used by the Chinese for thousands of years for many health conditions, including treatment of menopause. It is especially useful in keeping the vaginal walls supple and healthy, preventing drying, tearing and pain. Ginseng helps relieve insomnia, moodiness and hot flashes.    Food remedies  Phytoestrogens contain plant-based estrogens called isoflavones that provide natural relief for women with menopause symptoms. In a study performed at [facility], [place] in 2005, it was reported that isoflavones were found effective in reducing hot flashes and relieving vaginal  dryness during menopause. The recommended amount is 45 grams daily.    Phytoestrogens are found in a variety of foods such as sesame and flax seeds, red clover tea and soy. Although unfermented soy products are not recommended, fermented soy products such as tempeh, miso and natto contain high amounts of phytoestrogens, which help balance hormones during and after menopause. Be sure to only consume organic products that contain no GMO soy. Other foods with estrogenic effects are alfalfa sprouts, apples, barley, carrots, cherries, garbanzo beans, black-eyed peas, beets, garlic, oats, olive oil and sunflower seeds.    Flax seeds and flax seed oil is highly touted as one of the best estrogenic foods for natural menopause treatment. They are high in isoflavones, lignans and alpha-linolenic acid, which is converted during digestion to omega-3 fatty acids. Adding daily flax seed to the diet can help relieve a wide range of symptoms. Never heat flax seeds or the oil as this destroys their nutrient properties.    Homeopathy for menopause  Homeopathy offers various natural menopause treatments with several remedies being standouts for providing symptom relief.    Cimicifuga relieves all symptoms of menopause including night sweats and hot flashes, vaginal dryness, bleeding and mood swings. It is especially suited to women experiencing arthritic pains in the joints of the hands, headaches on the right side of the head and neck, and despairing depression.    Lachesis is well suited to women who experience extreme hot flashes, both day and night. The night time heat may be worse than during the day. There may be left-sided headaches and migraines. The woman needing Lachesis is often very talkative; and can be verbally nasty, making cruel, cutting remarks. These women tend to be on the warm side all the time and may crave alcoholic beverages.    Sepia is a natural menopause treatment for many symptoms. The woman needing Sepia  "may be chilly most of the time, except during hot flashes. She may weep easily and desire strenuous exercise or physical activity, which often relieves her symptoms temporarily. Sepia provides relief for sadness, indifference, moodiness, vaginal dryness and pain during intercourse.    Sources for this article include:    Endo Resolved: Estrogen foods (phytoestrogens) and the diet for endometriosis - why there is confusion.  http://www.endo-resolved.com/diet_phytoestrogens.html    Dietary Fiber Food: Phytoestrogen and Its Food Sources  http://www.DabKickberDynamixyz.Steelhead Composites    Natural News: Fermented Soy is Only Soy Food Fit for Human Consumption  http://www.IDMission/585305.html    National Center for Complementary and Alternative Medicine (NCCAM): Menopausal Symptoms  http://nccam.nih.gov/health/menopause/menopausesymptoms.htm    [facility] for [facility]: What is Homeopathy  http://www.homeopathic.org/content/what-is-homeopathy    [facility]: [place]  http://www.[facility]/altmed/articles/zqg-rvursk-599497.htm    [facility]: Menopause  http://www.[facility]/altmed/articles/menopause-153241.htm    [facility]: Major CAM Treatments for Menopause  http://fitsweb.[facility]: Vaginal Dryness  http://www.[facility].com/health/vaginal-dryness/CJ04924    HealthGuidance.[employer]: Relieve vaginal dryness  http://www.healthguidance.org    \"Ilana Ginecologiche\"; The Effects of Phytoestrogen Therapy on the Endometrium in Postmenopausal Women; MARLO Antunez et al; Oct 2005  http://www.ncbi.nlm.nih.gov    About the author:  READ MORE OF OMAR MCCONNELL (JB)''S ARTICLES AT THE FOLLOWING LINKS:     The ZACKARY Mcconnell Archives: www.Sequentat.Steelhead Composites  Natural News: http://www.IDMission/Oipqdk7754.html    ZACKARY Mcconnell is an herbalist and a classical homeopath, and has a post graduate degree in holistic nutrition. Isamar cares for both people and animals, " using alternative approaches to health care and lifestyle. She writes about wellness, green living, alternative medicine, holistic nutrition, homeopathy, herbs and naturopathic medicine. You can find her at [employer] at wwwVibeDeck and on Facebook at [email or web address]://[email or web address].[email or web address].Vend-[email or web address]-Bar/[email or web address] or on Twitter at Intuitive Designs or https://ID Analyticsitter.Vend-a-Bar/Intuitive Designs     READ MORE OF OMAR MCCONNELL (JB)''S ARTICLES AT THE FOLLOWING LINKS: The [employer] Archives: www.ClrTouch Natural News: http://www.Pocits/Uoxnos9166.html ZACKARY Mcconnell is an herbalist and a classical homeopath, and has a post graduate degree in holistic nutrition. Isamar cares for both people and animals, using alternative approaches to health care and lifestyle. She writes about wellness, green living, alternative medicine, holistic nutrition, homeopathy, herbs and naturopathic medicine. You can find her at [employer] at wwwVibeDeck and on Facebook at [email or web address]://[email or web address] or on Twitter at Intuitive [employer]s or https://ID [email or web address].Vend-a-Bar/[email or web address]       All content posted on this site is commentary or opinion and is protected under Free Speech. [employer] takes sole responsibility for all content. [employer] sells no hard products and earns no money from the recommendation of products. goTenna is presented for educational and commentary purposes only and should not be construed as professional advice from any licensed practitioner. [employer] assumes no responsibility for the use or misuse of this material. For the full terms of usage of this material, visit www.NaturalNews.com/terms.shtml

## 2022-12-19 NOTE — PROGRESS NOTES
"Leah is a 42 year old who is being evaluated via a billable video visit.      How would you like to obtain your AVS? MyChart  If the video visit is dropped, the invitation should be resent by: Text to cell phone: [phone]  Will anyone else be joining your video visit? No          Assessment & Plan     Anxiety  Long standing, chronic, UNcontrolled and ready to start medicine again - prescription for prozac sent to pharmacy with options discussed with patient at length. Return to clinic with any worsening or changes in symptoms and follow up with PCP for routine care.   - FLUoxetine (PROZAC) 20 MG capsule; Take 1 capsule (20 mg) by mouth daily    Review of prior external note(s) from - previous routine PCP notes  20 minutes spent on the date of the encounter doing chart review, history and exam, documentation and further activities per the note       BMI:   Estimated body mass index is 48.02 kg/m  as calculated from the following:    Height as of 10/31/22: 1.537 m (5' 0.5\").    Weight as of 10/31/22: 113.4 kg (250 lb).   Weight management plan: Discussed healthy diet and exercise guidelines    Depression Screening Follow Up    PHQ 12/19/2022   PHQ-9 Total Score 12   Q9: Thoughts of better off dead/self-harm past 2 weeks Not at all     Last PHQ-9 12/19/2022   1.  Little interest or pleasure in doing things 1   2.  Feeling down, depressed, or hopeless 2   3.  Trouble falling or staying asleep, or sleeping too much 2   4.  Feeling tired or having little energy 2   5.  Poor appetite or overeating 1   6.  Feeling bad about yourself 2   7.  Trouble concentrating 2   8.  Moving slowly or restless 0   Q9: Thoughts of better off dead/self-harm past 2 weeks 0   PHQ-9 Total Score 12       Follow Up Actions Taken  Referred patient back to PCP     Patient Instructions   naturalnews.com printable article  Originally published March 14 2012  Natural menopause treatment with herbs, food remedies and homeopathy relieves cause " of symptoms  by ZACKARY Penn     (NaturalNews) Not all women experience symptoms of menopause; however, for those that do, natural menopause treatment can help reduce a wide range of symptoms that may bring misery to a woman's senior years. Menopause is the result of changing hormones, specifically estrogen and progesterone. The [facility] reports that 40 percent or more of women experience significant symptoms. As the body rebalances itself for the post-childbearing years, symptoms such as night sweats and hot flashes, vaginal dryness, bleeding, irritability, moodiness and depression can appear. Natural menopause treatment with herbs, food remedies and homeopathic remedies can offer relief in place of dangerous HRT.    Treatment with herbs  Black Cohosh contains a plant-based estrogen that helps regulate hormones, offering relief to a wide range of women with menopause symptoms, such as vaginal dryness, itching, moodiness, depression and hot flashes.    Wild Yam is an excellent natural menopause treatment for regulating hormones, especially progesterone. This hormone plays a role in controlling moods, and is used to help with depression, moodiness, irritability and anger.    Ginseng has been used by the Chinese for thousands of years for many health conditions, including treatment of menopause. It is especially useful in keeping the vaginal walls supple and healthy, preventing drying, tearing and pain. Ginseng helps relieve insomnia, moodiness and hot flashes.    Food remedies  Phytoestrogens contain plant-based estrogens called isoflavones that provide natural relief for women with menopause symptoms. In a study performed at [facility] in 2005, it was reported that isoflavones were found effective in reducing hot flashes and relieving vaginal dryness during menopause. The recommended amount is 45 grams daily.    Phytoestrogens are found in a variety of foods such as sesame and flax seeds, red  clover tea and soy. Although unfermented soy products are not recommended, fermented soy products such as tempeh, miso and natto contain high amounts of phytoestrogens, which help balance hormones during and after menopause. Be sure to only consume organic products that contain no GMO soy. Other foods with estrogenic effects are alfalfa sprouts, apples, barley, carrots, cherries, garbanzo beans, black-eyed peas, beets, garlic, oats, olive oil and sunflower seeds.    Flax seeds and flax seed oil is highly touted as one of the best estrogenic foods for natural menopause treatment. They are high in isoflavones, lignans and alpha-linolenic acid, which is converted during digestion to omega-3 fatty acids. Adding daily flax seed to the diet can help relieve a wide range of symptoms. Never heat flax seeds or the oil as this destroys their nutrient properties.    Homeopathy for menopause  Homeopathy offers various natural menopause treatments with several remedies being standouts for providing symptom relief.    Cimicifuga relieves all symptoms of menopause including night sweats and hot flashes, vaginal dryness, bleeding and mood swings. It is especially suited to women experiencing arthritic pains in the joints of the hands, headaches on the right side of the head and neck, and despairing depression.    Lachesis is well suited to women who experience extreme hot flashes, both day and night. The night time heat may be worse than during the day. There may be left-sided headaches and migraines. The woman needing Lachesis is often very talkative; and can be verbally nasty, making cruel, cutting remarks. These women tend to be on the warm side all the time and may crave alcoholic beverages.    Sepia is a natural menopause treatment for many symptoms. The woman needing Sepia may be chilly most of the time, except during hot flashes. She may weep easily and desire strenuous exercise or physical activity, which often relieves her  "symptoms temporarily. Sepia provides relief for sadness, indifference, moodiness, vaginal dryness and pain during intercourse.    Sources for this article include:    Endo Resolved: Estrogen foods (phytoestrogens) and the diet for endometriosis - why there is confusion.  http://www.endo-resolved.com/diet_phytoestrogens.html    Dietary Fiber Food: Phytoestrogen and Its Food Sources  http://www.Pawzii.Incisive Surgical    Natural News: Fermented Soy is Only Soy Food Fit for Human Consumption  http://www.Goodman Asset Protection/090440.html    [facility] ([facility]): Menopausal Symptoms  http://nccam.nih.gov/health/menopause/menopausesymptoms.htm    [facility] for Homeopathy: What is Homeopathy  http://www.homeopathic.org/content/what-is-homeopathy    [facility]: [place]  http://www.[facility]/altmed/articles/ear-ynyjfc-739810.htm    [facility]: Menopause  http://www.[facility]/altmed/articles/menopause-118273.htm    [facility]: Major CAM Treatments for Menopause  http://fitsweb.[facility]: Vaginal Dryness  http://www.Seisquare/health/vaginal-dryness/CE54567    HealthGuidance.Incisive Surgical: Relieve vaginal dryness  http://www.healthguidance.org    \"Ilana Ginecologiche\"; The Effects of Phytoestrogen Therapy on the Endometrium in Postmenopausal Women; MARLO Antunez et al; Oct 2005  http://www.ncbi.nlm.nih.gov    About the author:  READ MORE OF OMAR MCCONNELL (JB)''S ARTICLES AT THE FOLLOWING LINKS:     The ZACKARY Mcconnell Archives: www.VolunteerSpot.Incisive Surgical  Natural News: http://www.Goodman Asset Protection/Wdlsmv0922.html    ZACKARY Mcconnell is an herbalist and a classical homeopath, and has a post graduate degree in holistic nutrition. Isamar cares for both people and animals, using alternative approaches to health care and lifestyle. She writes about wellness, green living, alternative medicine, holistic nutrition, homeopathy, " herbs and naturopathic medicine. You can find her at The [employer] Archives at www.Wonolo and on Facebook at https://www.OtherInbox.VirnetX/[email or web address] or on Twitter at [email or web address] or https://[employer].VirnetX/[email or web address]     READ MORE OF OMAR MCCONNELL (JB)''S ARTICLES AT THE FOLLOWING LINKS: The [employer] Archives: www.Wonolo Natural News: http://www.Wummelbox/Qwlaqb9026.html ZACKARY Mcconnell is an herbalist and a classical homeopath, and has a post graduate degree in holistic nutrition. Isamar cares for both people and animals, using alternative approaches to health care and lifestyle. She writes about wellness, green living, alternative medicine, holistic nutrition, homeopathy, herbs and naturopathic medicine. You can find her at [employer] at wwwImageBrief and on Facebook at https://www.OtherInbox.VirnetX/nbnjgunp30 or on Twitter at [email or web address] or https://Apolo Energiaitter.VirnetX/navfqamm76       All content posted on this site is commentary or opinion and is protected under Free Speech. [employer] takes sole responsibility for all content. [employer] sells no hard products and earns no money from the recommendation of products. [employer] is presented for educational and commentary purposes only and should not be construed as professional advice from any licensed practitioner. [employer] assumes no responsibility for the use or misuse of this material. For the full terms of usage of this material, visit www.NaturalNews.com/terms.shtml        Return in about 3 months (around 3/19/2023) for Follow up, with PCP, Routine Visit, or sooner with worsening symptoms.    Brittany Johns PA-C  [facility] is a 42 year old presenting for the following health issues:  Depression and Anxiety      History of Present Illness       Mental Health Follow-up:  Patient presents to follow-up on Depression & Anxiety.Patient's depression since last visit has been:   Worse  The patient is having other symptoms associated with depression.  Patient's anxiety since last visit has been:  Worse  The patient is having other symptoms associated with anxiety.  Any significant life events: No  Patient is not feeling anxious or having panic attacks.  Patient has no concerns about alcohol or drug use.    She eats 2-3 servings of fruits and vegetables daily.She consumes 1 sweetened beverage(s) daily.She exercises with enough effort to increase her heart rate 10 to 19 minutes per day.  She exercises with enough effort to increase her heart rate 3 or less days per week.   She is taking medications regularly.    Today's PHQ-9         PHQ-9 Total Score: 9    PHQ-9 Q9 Thoughts of better off dead/self-harm past 2 weeks :   Not at all    How difficult have these problems made it for you to do your work, take care of things at home, or get along with other people: Somewhat difficult  Today's DANY-7 Score: 10       History of prozac in the past and ready to restart.    History of doing counseling in the past and has resources through work.    Social History     Tobacco Use     Smoking status: Never     Smokeless tobacco: Never   Substance Use Topics     Alcohol use: Yes     Alcohol/week: 2.5 standard drinks     Types: 3 Standard drinks or equivalent per week     Drug use: No     PHQ 12/16/2022 12/16/2022 12/19/2022   PHQ-9 Total Score 11 9 12   Q9: Thoughts of better off dead/self-harm past 2 weeks Not at all Not at all Not at all     DANY-7 SCORE 12/16/2022 12/16/2022   Total Score 10 (moderate anxiety) 12 (moderate anxiety)   Total Score 12 10       Suicide Assessment Five-step Evaluation and Treatment (SAFE-T)      Review of Systems   Constitutional, HEENT, cardiovascular, pulmonary, GI, , musculoskeletal, neuro, skin, endocrine and psych systems are negative, except as otherwise noted.      Objective           Vitals:  No vitals were obtained today due to virtual visit.    Physical Exam    GENERAL: Healthy, alert and no distress  EYES: Eyes grossly normal to inspection.  No discharge or erythema, or obvious scleral/conjunctival abnormalities.  RESP: No audible wheeze, cough, or visible cyanosis.  No visible retractions or increased work of breathing.    SKIN: Visible skin clear. No significant rash, abnormal pigmentation or lesions.  NEURO: Cranial nerves grossly intact.  Mentation and speech appropriate for age.  PSYCH: Mentation appears normal, affect normal/bright, judgement and insight intact, normal speech and appearance well-groomed.            Video-Visit Details    Video Start Time: 10:03 AM    Type of service:  Video Visit    Video End Time:10:22 AM    Originating Location (pt. Location): Home        Distant Location (provider location):  Off-site    Platform used for Video Visit: [employer]

## 2022-12-20 ASSESSMENT — PATIENT HEALTH QUESTIONNAIRE - PHQ9: SUM OF ALL RESPONSES TO PHQ QUESTIONS 1-9: 12

## 2023-02-15 ENCOUNTER — VIRTUAL VISIT (OUTPATIENT)
Dept: SURGERY | Facility: CLINIC | Age: 43
End: 2023-02-15
Payer: COMMERCIAL

## 2023-02-15 VITALS — WEIGHT: 246 LBS | BODY MASS INDEX: 46.44 KG/M2 | HEIGHT: 61 IN

## 2023-02-15 DIAGNOSIS — K91.2 POSTOPERATIVE MALABSORPTION: ICD-10-CM

## 2023-02-15 DIAGNOSIS — E66.01 CLASS 3 SEVERE OBESITY DUE TO EXCESS CALORIES WITH BODY MASS INDEX (BMI) OF 45.0 TO 49.9 IN ADULT, UNSPECIFIED WHETHER SERIOUS COMORBIDITY PRESENT (H): Primary | ICD-10-CM

## 2023-02-15 DIAGNOSIS — D50.9 IRON DEFICIENCY ANEMIA, UNSPECIFIED IRON DEFICIENCY ANEMIA TYPE: ICD-10-CM

## 2023-02-15 DIAGNOSIS — E66.813 CLASS 3 SEVERE OBESITY DUE TO EXCESS CALORIES WITH SERIOUS COMORBIDITY AND BODY MASS INDEX (BMI) OF 45.0 TO 49.9 IN ADULT (H): ICD-10-CM

## 2023-02-15 DIAGNOSIS — E66.01 CLASS 3 SEVERE OBESITY DUE TO EXCESS CALORIES WITH SERIOUS COMORBIDITY AND BODY MASS INDEX (BMI) OF 45.0 TO 49.9 IN ADULT (H): ICD-10-CM

## 2023-02-15 DIAGNOSIS — Z98.84 S/P BARIATRIC SURGERY: ICD-10-CM

## 2023-02-15 DIAGNOSIS — E66.813 CLASS 3 SEVERE OBESITY DUE TO EXCESS CALORIES WITH BODY MASS INDEX (BMI) OF 45.0 TO 49.9 IN ADULT, UNSPECIFIED WHETHER SERIOUS COMORBIDITY PRESENT (H): Primary | ICD-10-CM

## 2023-02-15 PROCEDURE — 99214 OFFICE O/P EST MOD 30 MIN: CPT | Mod: VID | Performed by: EMERGENCY MEDICINE

## 2023-02-15 RX ORDER — SEMAGLUTIDE 1 MG/.5ML
1 INJECTION, SOLUTION SUBCUTANEOUS
Qty: 2 ML | Refills: 0 | Status: SHIPPED | OUTPATIENT
Start: 2023-02-15 | End: 2023-03-15

## 2023-02-15 RX ORDER — SEMAGLUTIDE 0.25 MG/.5ML
0.25 INJECTION, SOLUTION SUBCUTANEOUS WEEKLY
Qty: 2 ML | Refills: 0 | Status: SHIPPED | OUTPATIENT
Start: 2023-02-15 | End: 2023-03-15

## 2023-02-15 RX ORDER — SEMAGLUTIDE 2.4 MG/.75ML
2.4 INJECTION, SOLUTION SUBCUTANEOUS
Qty: 3 ML | Refills: 9 | Status: SHIPPED | OUTPATIENT
Start: 2023-02-15 | End: 2023-11-12

## 2023-02-15 RX ORDER — SEMAGLUTIDE 0.5 MG/.5ML
0.5 INJECTION, SOLUTION SUBCUTANEOUS
Qty: 2 ML | Refills: 0 | Status: SHIPPED | OUTPATIENT
Start: 2023-02-15 | End: 2023-03-15

## 2023-02-15 RX ORDER — SEMAGLUTIDE 1.7 MG/.75ML
1.7 INJECTION, SOLUTION SUBCUTANEOUS
Qty: 3 ML | Refills: 0 | Status: SHIPPED | OUTPATIENT
Start: 2023-02-15 | End: 2023-03-15

## 2023-02-15 NOTE — PROGRESS NOTES
Leah John is 42 year old  female who presents for a billable video visit today.    How would you like to obtain your AVS? MyChart  If dropped from the video visit, the video invitation should be resent by: Text to cell phone: [phone]  Will anyone else be joining your video visit? No      Video Start Time: 10:30am    Are there any specific questions or needs that you would like addressed at your visit today? Return MWM           Video-Visit Details    Type of service:  Video Visit    Platform used for Video Visit: [employer] [place]    Video End Time (time video stopped): 11:01 AM    Originating Location (pt. Location): Home        Distant Location (provider location):  On-site    Distant Location (provider location):  [facility] AND [facility]         Bariatric Follow Up Visit with a History of Previous Bariatric Surgery     Date of visit: 2/15/2023  Physician: Manoj Stover MD, MD  Primary Care Provider:  No primary care provider on file.  Leah John   42 year old  female    Date of Surgery: 7/15/15  Initial Weight: 311 lbs  Initial BMI: 59.7  Today's Weight:   Wt Readings from Last 1 Encounters:   02/15/23 111.6 kg (246 lb)     Weight history:   Wt Readings from Last 4 Encounters:   02/15/23 111.6 kg (246 lb)   10/31/22 113.4 kg (250 lb)   08/01/22 121.1 kg (266 lb 14.4 oz)   06/29/22 120.2 kg (265 lb)      Body mass index is 47.25 kg/m .      Assessment and Plan     Assessment: Leah is a 42 year old year old female who is 7.5 years s/p  Pavithra en Y Gastric Bypass with Dr. Jeffrey.  She's had some success with weight reduction since surgery but was struggling with some weight regain following a pregnancy last year, and started phentermine therapy March of 2022 to help with her hunger control and for addressing her persistent Class III, morbid obesity.  Today, she's checking in on her medication efficacy/tolerance and finds phentermine has lost efficacy and is interested  in Wegovy trial which she believes is on her covered meds.  Since her iron levels were a bit low and mild anemia present last year, she has been good with her iron supplementation.    Leah John feels as if she hasn't fully achieved the goals she hoped to accomplish through bariatric surgery and weight loss and we'll continue to support her treatment for obesity with surgical/dietary/medication support for continued health benefit.    Encounter Diagnoses   Name Primary?     Class 3 severe obesity due to excess calories with body mass index (BMI) of 45.0 to 49.9 in adult, unspecified whether serious comorbidity present (H) Yes     Class 3 severe obesity due to excess calories with serious comorbidity and body mass index (BMI) of 45.0 to 49.9 in adult (H)          Current Outpatient Medications:      Semaglutide-Weight Management (WEGOVY) 0.25 MG/0.5ML SOAJ, Inject 0.25 mg Subcutaneous once a week for 28 days 4 pens. Will ramp up dose monthly if tolerating well to goal of 2.4mg/week eventually., Disp: 2 mL, Rfl: 0     Semaglutide-Weight Management (WEGOVY) 0.5 MG/0.5ML SOAJ, Inject 0.5 mg Subcutaneous every 7 days for 28 days 4 pens, Disp: 2 mL, Rfl: 0     Semaglutide-Weight Management (WEGOVY) 1 MG/0.5ML SOAJ, Inject 1 mg Subcutaneous every 7 days for 28 days 4 pens, Disp: 2 mL, Rfl: 0     Semaglutide-Weight Management (WEGOVY) 1.7 MG/0.75ML SOAJ, Inject 1.7 mg Subcutaneous every 7 days for 28 days 4 pens, Disp: 3 mL, Rfl: 0     Semaglutide-Weight Management (WEGOVY) 2.4 MG/0.75ML SOAJ, Inject 2.4 mg Subcutaneous every 7 days for 270 days 4 pens, Disp: 3 mL, Rfl: 9     calcium citrate (CITRACAL) 950 (200 Ca) MG tablet, Take 1 tablet by mouth 2 times daily, Disp: , Rfl:      ferrous fumarate 65 mg, [place]. FE,-Vitamin C 125 mg (VITRON-C)  MG TABS tablet, Use twice daily, 20minutes after meals for 30 days then reduce to once daily dosing thereafter for 2 months until gone., Disp: 120 tablet, Rfl: 0      FLUoxetine (PROZAC) 20 MG capsule, Take 1 capsule (20 mg) by mouth daily, Disp: 90 capsule, Rfl: 1     Multiple Vitamin (MULTIVITAMIN ADULT PO), , Disp: , Rfl:      vitamin B-12 (CYANOCOBALAMIN) 500 MCG tablet, , Disp: , Rfl:      Vitamin D (Cholecalciferol) 25 MCG (1000 UT) TABS, , Disp: , Rfl:     Plan:  1.  Continue to practice good bariatric methods with protein rich, low calorie meals as you've done. You've maintained 65 lb reduction from preoperative weight and down 20 lbs since august of 2022.     2. Given loss of efficacy of phentermine and persistent BMI of 47 we'll stop the phentermine and add Wegovy, ramping up over the next 5 months to the 2.4mg/week dose and look to maintain use for 9 months if tolerated at that 2.4mg/week dose.  Stop if rash/intolerances. Ramp up monthly as follows: 0.25mg/week for 4 weeks then 0.5mg/week for 4 weeks then 1mg/weekfor 4 weeks then 1.7mg/week for 4 weeks and finally 2.4mg/week for 9 months if efficacious and well tolerated.    Stop wegovy if rash/throat swelling/voice change/ severe nausea/vomiting or abdominal pain (risk for pancreatitis in less than 1% of patient). I recommend dosing on Thursday or Fridays.    3. Recheck with me in 3-4 months as you ramp up medication. Check weight at least weekly at home.     4. Ramping up fitness to help mood/energy/metabolic drive and if you can having 2-3 days weekly of some strength work helps greatly with energy and long term metabolic drive.     5. Continue iron support and we'll see how labs look at the time of our next visit. Mild anemia last time with low iron so shooting for 60-80mg/day of vitamin support of iron is recommended.  If constipation issues Vitamin [employer] Blood or [employer] Liquid iron works well for many and can bolster iron intake by 28-45mg/day per dose, complementing the twice daily complete multivitamin with 18mg or iron per serving (check label). Equate complete adult is a quality/cheap multivitamin or  [facility] women's. 2 daily.  Return in about 4 months (around 6/15/2023) for Follow up.    Bariatric Surgery Review     Interim History/LifeChanges: feels 'stagnant' during winter but not gaining. Low efficacy of phentermine lately and was interested in trial of Wegovy which she states is on her formulary.  She's down 65 lbs over her starting weight of 311 lbs in 2015 and down 20 lbs from August but efficacy of phentermine is wearing off so alternative is indicated to help treat her morbid obesity with comorbid dyslipidemia and metabolic syndrome history. We discussed ramping process/use/side effects to watch for and will begin the process.    Patient Concerns: appetite supressent support  Appetite (1-10):snacking  GERD: no.        Started Prozac recently.             Child is now 4.5 years old, high energy child but in good health.    Most recent labs:  Lab Results   Component Value Date    WBC 6.9 10/05/2022    HGB 10.5 (L) 10/05/2022    HCT 33.1 (L) 10/05/2022    MCV 75 (L) 10/05/2022     10/05/2022     Lab Results   Component Value Date    CHOL 180 10/31/2022     Lab Results   Component Value Date    HDL 54 10/31/2022     No components found for: LDLCALC  Lab Results   Component Value Date    TRIG 78 10/31/2022     No results found for: CHOLHDL  Lab Results   Component Value Date    ALT 11 03/29/2022    AST 16 03/29/2022    ALKPHOS 96 03/29/2022     No results found for: HGBA1C  Lab Results   Component Value Date    B12 738 03/29/2022     No components found for: VITDT1  Lab Results   Component Value Date    JAMAR 15 10/05/2022     Lab Results   Component Value Date    PTHI 54 07/26/2022     Lab Results   Component Value Date    ZN 78.4 03/29/2022     Lab Results   Component Value Date    VIB1WB 101 03/29/2022     Lab Results   Component Value Date    TSH 4.57 03/29/2022     No results found for: TEST      She's walking regularly, looking to increase strength training for exercise this year.    Social History      Social History     Socioeconomic History     Marital status:      Spouse name: Not on file     Number of children: Not on file     Years of education: Not on file     Highest education level: Not on file   Occupational History     Not on file   Tobacco Use     Smoking status: Never     Smokeless tobacco: Never   Substance and Sexual Activity     Alcohol use: Yes     Alcohol/week: 2.5 standard drinks     Types: 3 Standard drinks or equivalent per week     Drug use: No     Sexual activity: Yes     Partners: Male     Birth control/protection: I.U.D., Other   Other Topics Concern     Parent/sibling w/ CABG, MI or angioplasty before 65F 55M? No   Social History Narrative     Not on file     Social Determinants of Health     Financial Resource Strain: Not on file   Food Insecurity: Not on file   Transportation Needs: Not on file   Physical Activity: Not on file   Stress: Not on file   Social Connections: Not on file   Intimate Partner Violence: Not on file   Housing Stability: Not on file       Past Medical History     Past Medical History:   Diagnosis Date     Anisometropia      Anxiety      Cervical intraepithelial neoplasia III      Depression     20s; on medications zoloft; prozac x 1 year     Depressive disorder      Dyslipidemia      Hypothyroidism      Metabolic syndrome      Morbid obesity (H)      MTHFR mutation     heterozygous     PCOS (polycystic ovarian syndrome)      Polycystic ovary syndrome      Pre-diabetes      Prothrombin gene mutation (H) 2017    heterozygous     Strabismus      Past Surgical History:   Procedure Laterality Date     ABDOMEN SURGERY  2015    Gastric bypass     BIOPSY CERVICAL, LOCAL EXCISION, SINGLE/MULTIPLE      ALFRED III      SECTION N/A 2018    Procedure: PRIMARY  SECTION;  Surgeon: Becky Rg MD;  Location: [facility] OR;  Service:      COLONOSCOPY       EXCISE GANGLION WRIST Right      EYE SURGERY      x3; as a  "child     LEEP TX, CERVICAL       [place] GASTRIC BYPASS/PAVITHRA-EN-Y N/A 07/15/2015    Mark Olivier MD; [place]. Initial Weight 311 lbs, BMI 59.7     SOFT TISSUE SURGERY      Ganglion cyst removal       Problem List     Patient Active Problem List   Diagnosis     Knee pain     Morbid obesity (H)     Anisometropia     Anxiety     Hyperlipidemia with target LDL less than 160     Hyperparathyroidism (H)     Hypothyroidism     Iron deficiency anemia     Low vitamin B12 level     Metabolic syndrome     MTHFR mutation     PCOS (polycystic ovarian syndrome)     Pre-diabetes     S/P bariatric surgery     Vitamin D deficiency     Morbid obesity with BMI of 50.0-59.9, adult (H)     ALFRED III (cervical intraepithelial neoplasia III)     Stress fracture of right foot, initial encounter     Plantar fasciitis     Medications     [unfilled]  Surgical History     Past Surgical History  She has a past surgical history that includes leep tx, cervical (); Excise ganglion wrist (Right); Eye surgery; Pr Lap Gastric Bypass/Pavithra-En-Y (N/A, 07/15/2015); Biopsy cervical, local excision, single/multiple ();  Section (N/A, 2018); Abdomen surgery (2015); colonoscopy (); and Soft tissue surgery ().    Objective-Exam     Constitutional:  Ht 1.537 m (5' 0.5\")   Wt 111.6 kg (246 lb)   BMI 47.25 kg/m    [unfilled]   General:  Pleasant and in no acute distress   Eyes:  EOMI  ENT:  Airway patent    Neck:  Respiratory: Normal respiratory effort, no cough, .  CV:    Gastrointestinal:   Musculoskeletal: muscle mass WNL  Skin: color fair, hair red/blond,   Psychiatric: alert and oriented X3, mood and affect normal    Counseling     We reviewed the important post op bariatric recommendations:  -eating 3 meals daily  -eating protein first, getting >60gm protein daily  -eating slowly, chewing food well  -avoiding/limiting calorie containing beverages  -drinking water 15-30 minutes before or after " meals  -the role of medication support in weight management.    Manoj Stover MD    [facility].  2/15/2023  7:44 AM  [phone] (clinic phone)  [phone] (fax)    No images are attached to the encounter.  Medical Decision Makin minutes spent on the date of the encounter doing chart review, history and exam, documentation and further activities per the note

## 2023-02-15 NOTE — PATIENT INSTRUCTIONS
Plan:  1.  Continue to practice good bariatric methods with protein rich, low calorie meals as you've done. You've maintained 65 lb reduction from preoperative weight and down 20 lbs since august of 2022.     2. Given loss of efficacy of phentermine and persistent BMI of 47 we'll stop the phentermine and add Wegovy, ramping up over the next 5 months to the 2.4mg/week dose and look to maintain use for 9 months if tolerated at that 2.4mg/week dose.  Stop if rash/intolerances. Ramp up monthly as follows: 0.25mg/week for 4 weeks then 0.5mg/week for 4 weeks then 1mg/weekfor 4 weeks then 1.7mg/week for 4 weeks and finally 2.4mg/week for 9 months if efficacious and well tolerated.    Stop wegovy if rash/throat swelling/voice change/ severe nausea/vomiting or abdominal pain (risk for pancreatitis in less than 1% of patient). I recommend dosing on Thursday or Fridays.    3. Recheck with me in 3-4 months as you ramp up medication. Check weight at least weekly at home.     4. Ramping up fitness to help mood/energy/metabolic drive and if you can having 2-3 days weekly of some strength work helps greatly with energy and long term metabolic drive.     5. Continue iron support and we'll see how labs look at the time of our next visit. Mild anemia last time with low iron so shooting for 60-80mg/day of vitamin support of iron is recommended.  If constipation issues Vitamin Code Health Blood or Ferritt's Liquid iron works well for many and can bolster iron intake by 28-45mg/day per dose, complementing the twice daily complete multivitamin with 18mg or iron per serving (check label). Equate complete adult is a quality/cheap multivitamin or C women's. 2 daily.      MEDICATIONS FOR WEIGHT LOSS  There are several medications available to assist us in weight loss.  By themselves, without a mindful change in diet and increase in movement/activity these medications are disappointing in their results. However, combined with a closely  monitored program of diet change and exercise they can be very effective in controlling appetite and boosting initial weight loss.  All weight loss medications need continual re-evaluation for efficacy as their side effects and health benefits fail to be worthwhile if a person is not continuing to lose weight or in maintaining their healthy weight.  Some weight loss medications are scheduled drugs, meaning there is at least a theoretical possibility for developing addiction to them, but in practice this is rare.  We do anticipate coming off meds in the future- after stabilization of weight loss is assurred.  Finally, a tolerance can develop and people s perceived efficacy of medication can diminish.  In communication with your physician, it may be appropriate to intermittently take a break from these medications and then restart again (few weeks off then restart again) if a plateau is reached that cannot be broken through.  Each person can respond to a medication differently and to be a good option for you, it will need to be affordable, effective and well tolerated with minimal side effects.    In most cases, weight loss progress after one month and three months will be obtained and if a patient is not reaching the satisfactory progress towards weight loss, the medications may be discontinued.  The thought is that if a person is taking a weight loss medication and not receiving the potential health benefit of that drug, the side effects are not worthwhile and use should be discontinued.  On the flip side, there are many people on some weight loss medications for years because it continues to be an effective tool in their weight management and they are tolerating the medication without any long-term side effects.  Each person's response and purpose will be evaluated.      PHENTERMINE (Adipex): approved in 1959 for appetite suppression.  It has stimulant effects and cannot be used with Ritalin, Concerta, or other  stimulants.  Although it is not highly addictive, it's chemically related to amphetamines which are addictive and is classified as a Controlled Substance by the [place].  Occasional dependence can develop, but rarely. The most common side effects are dry mouth, increased energy and concentration, increased pulse, and constipation.  You should not take phentermine if you have glaucoma, hyperthyroidism, or uncontrolled/untreated hypertension or overly anxious. You should stop if dramatic mood swings, severe insomnia, palpations, chest pains, visual changes or if your Blood Pressure is consistently elevated or any time it's over 160/90.   It's ok to go off the med for a few weeks and restart if efficacy is wearing off.  $24-$30 for 90 tablets at [facility]. Females are required to have reliable birth control to reduce the risk birth defects/miscarriage.      TOPIRAMATE (Topamax): Anti-seizure medication, also used to prevent migraines and sometimes for mood stabilization.  Side effects include paresthesia, glaucoma, altered concentration, attention difficulties, memory and speech problems, metabolic acidosis, depression, increase in body temperature and decrease sweating, risk of kidney stones.  Do not take Topamax while taking Depakote as this can cause high ammonia levels.  You must have reliable birth control as Topamax can cause birth defects.  If prolonged use has occurred it should be tapered off slowly to avoid withdrawal issues.  Insurance usually covers Topiramate.  At higher doses, there may be some confusion/forgetfulness associated with this so we try to limit dose to under 75mg twice daily to reduce this risk. Often covered by insurance as it's used for many reasons.  Topamax will cause carbonated beverages to taste bad. A recheck of your kidney/electrolytes may occur within a few months of starting.    QSYMIA (Phentermine + Topamax):  See above information about phentermine and Topamax.  Most common side  effects are paresthesia, dizziness, distortion of taste, insomnia, constipation, and dry mouth.  See above descriptions for the two individual agents.Females are required to have reliable birth control to reduce the risk birth defects/miscarriage.  $150-$220 per month      GLP1 Agonists:  Liraglutide (Victoza/Saxenda), Semaglutide (Ozempic/Wegovy):   Part of the family of Glucagon Like Peptide Agonists, these medications directly suppresses appetite and are often used by diabetic patients due to improvements in glucose/insulin balance.  They also slow how quickly the stomach empties, increasing fullness. They may be hard to get covered for non diabetics and some plans have exclusions for weight loss purposes.  Currently, these are  injectable medications delivered via autoinjector pen. It can be very costly without insurance coverage (over $500/month).  Small risks for pancreatitis exists and dose should be held if increased mid abdominal pain/burning. It is not to be used if previous Multiple Endocrine Neoplasia. In rodents, may increase risk of thyroid tumors and not indicated for anyone with a history of medullary thyroid cancer as a result.  If changes in voice/swallowing should be discontinued. Reliable birth control required in women. [email or web address], Wegovy.com has more information on these medications.    Contrave (Bupropion/Naltrexone).    Synergistic combination of a mild appetite suppressing anti-depressant (Bupropion) whose effects are increased due to interaction with Naltrexone.  Naltrexone may have some effects on craving and is often used in addiction medicine to help previous opiate addicts be less prone to relapse as it blocks the action of opiates. Should be stopped if any need for opiate pain medication, surgery or planned procedures where you'll be given sedation/anesthesia. If prolonged use, recommend stepping down bupropion over 2-3 weeks to limit any risk of withdrawal issues. Side effects may  "include dry mouth, increased heart rate, mild elevation in Blood pressure;  dizziness, ringing in the ears, anxiety (typically due to bupropion), nausea, constipation, and some get fatigued with naltrexone.  About $210 on [facility] for 120 tabs of \"Contrave\", the brand name without insurance coverage. Generic Bupropion 75mg: $25 for 120 tabs, Naltrexone: $55 for 90 tabs without insurance coverage on [employer]. Cannot be used if pregnant/trying to conceive or breast feeding.      Plenity:   By mail order pharmacy only, moderately expensive. $98/month.  2-3 capsules taken with 16 oz of water, 20 minutes before 2 meals daily provides crystals that expand into a gel that fills the stomach 20-25% and provides a mechanical fullness.  The Plenity then mixes with your next meal and increases satisfaction by bulking the meal up to feel bigger than it truly is. Plenity is not absorbed and gets passed through the digestive tract and excreted in stools. May cause some bloating/gas/full feeling as it behaves like a fiber in many ways. Cost not available yet. FDA cleared in March of 2019 and became available near the end of 2020 through mail order pharmacy only ([employer]). The safety and efficacy trials were done under \"Gelesis\" name. Not appropriate for people with stomach or bowel motility issues as requires you to pass it through digestive tract. [email or web address] has more information.      [facility] Bariat[facility]  Nutritional Guidelines  Gastric Bypass 18 Months Post Op and Beyond    General Guidelines and Helpful Hints:  Eat 3 meals per day + protein supplement(s). No snacks between meals.  Do not skip meals.  This can cause overeating at the next meal and will prevent adequate protein and nutritional intake.  Aim for 60-80 grams of protein per day.  Always eat your protein first. This assists with optimal nutrition and helps you stay full longer.  Depending on your portion size, you may need to drink approved protein " "supplement between meals to achieve protein goals. Follow recommendations of your Dietitian.   Eat your protein first, and then follow with fiber.   It is not necessary to count your fiber, but 15-20 grams per day is recommended.    Add fiber by including fruits, vegetables, whole grains, and beans.   Portions should remain about 1 cup per meal. Use measuring cups to be accurate.  Continue to use saucer/salad plates, infant/toddler silverware to keep portion sizes small and take small bites.  Eat [id number] to make each meal last 20-30 minutes. Always stop eating when satisfied.  Continue to use caution with foods containing skins, peels or membranes. Chew well!  Aim for 64 oz. of calorie-free fluids daily.  Continue to avoid caffeine and carbonation. If you choose to drink alcohol, do so in moderation.   Remember to avoid drinking during meals, 15-30 minutes before and 30 minutes after.  Exercise is benavidez for continued weight loss and weight maintenance. 150 minutes weekly of moderate aerobic activity or 75 minutes of vigorous with 2 days or more a week of strength training. Try to get 20% or more of your steps each day at a brisk pace, as though hurrying to a bus stop. Look to get stronger this year.  If having trouble tolerating meat, try using a crock-pot, tinfoil tent, steamer or other moist cooking method to create tender meats. Add broth or low-fat gravy to help meat stay moist.   Avoid high sugar and high fat foods to prevent dumping syndrome.  Check nutrition labels for less than 10 grams of sugar and less than 10 grams of fat per serving.  Continue Taking Vitamins/Minerals:  1000 mcg of Sublingual B-12 at least 3 days weekly to average 350-500mcg/day. If using 2500mcg lozenges, 2 weekly.  If 5000 mcg, once weekly dosing works.  1 Complete Multivitamin with 18mg Iron twice daily (chewable or swallow tabs). Often sold as \"women's one a day\" if tablet but take twice daily.  500-600 mg Calcium Citrate twice " "daily (chewable or swallow tabs).  5000 IU Vitamin D3 daily.  If menstruating, you may need closer to 60mg of iron daily to prevent iron deficiency. An occasional \"boost\" of extra iron supplement during/after is reasonable if heavy flow.    Sample Grocery List    Protein:  Fat free Greek or light yogurt (less than 10 grams sugar)  Fat free or low-fat cottage cheese  String cheese or reduced fat cheese slices  Tuna, salmon, crab, egg, or chicken salad made with light or fat free mayonnaise  Egg or Egg Substitute  Lean/extra lean turkey, beef, bison, venison (ground, sirloin, round, flank)  Pork loin or tenderloin (grilled, baked, broiled)  Fish such as salmon, tuna, trout, tilapia, etc. (grilled, baked, broiled)  Tender cuts of lean (skinless) turkey or chicken  Lean deli meats: turkey, lean ham, chicken, lean roast beef  Beans such as kidney, garbanzo, black, mendez, or low-fat/fat free refried beans  Peanut butter (natural preferred). Limit to 1 Tbsp. per day.  Low-fat meatloaf (made with lean ground beef or turkey)  Sloppy Joes made with low-sugar ketchup and lean ground beef or turkey  Soy or vegetable protein (i.e. vegan crumbles, soy/veggie burger, tofu)  Hummus    Vegetables:  Fresh: cooked or raw (as tolerated)  Frozen vegetables  Canned vegetables (low sodium or no salt added, rinse before cooking/eating)  (Ok to have skins/peels/membranes/seeds - just chew well)    Fruits:  Fresh fruit  Frozen fruit (no sugar added)  Canned fruit (packed in its own juice, NOT syrup)  (Ok to have skins/peels/membranes/seeds - just chew well)    Starch:  Unsweetened whole-grain hot cereal (or high fiber cold cereal, dry)  Toasted whole wheat bread or [place] Thins  Whole grain crackers  Baked /boiled/mashed potato/sweet potato  Cooked whole grain pasta, brown rice, or other cooked whole grains  Starchy vegetables: corn, peas, winter squash    Protein Supplement:   Ready to drink protein shake with:  15-30 grams protein per " serving  Less than 10 grams total carbohydrate per serving   Protein powder mixed with:   Skim or 1% milk  Low fat or fat free Lactaid milk, plain or no sugar added soymilk  Water     Fats: (use in moderation)  1 teaspoon of soft tub margarine  1 teaspoon olive oil, canola oil, or peanut oil  1 tablespoon of low-fat mccormick or salad dressing     Sample Menu for 18+ months after Gastric Bypass    You do NOT need to eat/drink the full portion sizes listed below  Always stop when you are satisfied    Breakfast   cup 1% cottage cheese     cup mixed berries   Lunch 2 oz lean roast beef on   [place] Thin with 1 tsp. light mccormick    small tomato, chopped, mixed with 1 tsp. light vinaigrette dressing   Supplement Approved protein supplement (if needed between meals)   Dinner 2 oz grilled salmon    cup salad greens with 1 tsp. light salad dressing and 1 tsp. ground flax seed    cup quinoa or brown rice     Breakfast   cup egg substitute with   cup sautéed chopped vegetables  2 light [place] Krisp crackers   Lunch Tuna Melt:   cup tuna mixed with 1 tsp. light mccormick over   [place] Thin. Top with 2-3 slices cucumber and 1 oz slice of low fat cheese   Supplement 1 cup skim milk (if needed between meals)   Dinner 3 oz  grilled, broiled, or baked seasoned skinless chicken breast    cup asparagus     Breakfast   cup plain oatmeal made with skim or 1% milk with 1 Tbsp. flavored/unflavored protein powder added  1 mozzarella string cheese   Lunch 2 oz deli turkey breast  1/3 cup salad with 1 tsp. light salad dressing, 1/8 of a whole avocado and 1 Tbsp. sunflower seeds   Dinner 3 oz. pork loin made in a crock pot, seasoned with a spice rub    cup cooked carrots   Supplement Approved protein supplement (if needed between meals)     Breakfast 1 cup breakfast casserole made with egg substitute, turkey sausage,  and steamed, chopped bell peppers   Supplement  1 cup light Greek yogurt (if needed between meals)   Lunch 2 oz. teriyaki turkey    cup  mashed sweet potato with 1-2 spritzes of spray butter    cup fresh pineapple   Dinner 3 oz low fat meatloaf    cup roasted garlic zucchini     Breakfast   cup leftover breakfast casserole    cup no sugar added applesauce with 1 Tbsp. unflavored protein powder and a sprinkle of cinnamon    Lunch 3 oz shrimp with 1-2 Tbsp. low-sugar cocktail sauce for dipping    c. whole wheat pasta drizzled with   tsp. olive oil   Supplement 1 cup skim/1% milk with scoop of protein powder (if needed between meals)   Dinner Grilled, seasoned kebob with 2 oz lean beef and   cup vegetables     Breakfast Breakfast pizza:   [place] Thin spread with 1 Tbsp. low sugar spaghetti sauce,   cup shredded low fat cheese, melted and 1 slice of Pakistani varma     cup fresh fruit mixed with chopped almonds   Lunch   cup black bean soup  4-5 whole grain crackers   Dinner 3 oz  tilapia with lemon pepper seasoning    cup stewed tomatoes   Supplement 1 string cheese (if needed between meals)     Breakfast 2 hard boiled eggs (discard 1 egg yolk)    whole wheat English Muffin with 1 tsp. low sugar jelly   Lunch   cup leftover black bean soup topped with 1-2 Tbsp. low fat cheese  2-3 light [place] Krisp crackers   Supplement Approved protein supplement (if needed between meals)   Dinner 3 oz sirloin steak    cup steamed broccoli      LEAN PROTEIN SOURCES  Getting 20-30 grams of protein, 3 meals daily, is appropriate for most people, some need more but more than about 40 grams per meal is not useful.  General rule is drinking one ounce of water per gram of protein eaten over the course of the day:  70 grams of protein each day, drink 70 oz of water.  Protein Source Portion Calories Grams of Protein                           Nonfat, plain Greek yogurt    (10 grams sugar or less) 3/4 cup (6 oz)  12-17   Light Yogurt (10 grams sugar or less) 3/4 cup (6 oz)  6-8   Protein Shake 1 shake 110-180 15-30   Skim/1% Milk or lactose-free milk 1 cup ( 8 oz)   8   Plain or light, flavored soymilk 1 cup  7-8   Plain or light, hemp milk 1 cup 110 6   Fat Free or 1% Cottage Cheese 1/2 cup 90 15   Part skim ricotta cheese 1/2 cup 100 14   Part skim or reduced fat cheese slices 1 ounce 65-80 8     Mozzarella String Cheese 1 80 8   Canned tuna, chicken, crab or salmon  (canned in water)  1/2 cup 100 15-20   White fish (broiled, grilled, baked) 3 ounces 100 21   [place]/Tuna (broiled, grilled, baked) 3 ounces 150-180 21   Shrimp, Scallops, Lobster, Crab 3 ounces 100 21   Pork loin, Pork Tenderloin 3 ounces 150 21   Boneless, skinless chicken /turkey breast                          (broiled, grilled, baked) 3 ounces 120 21   [place], [place], [place], and Venison 3 ounces 120 21   Lean cuts of red meat and pork (sirloin,   round, tenderloin, flank, ground 93%-96%) 3 ounces 170 21   Lean or Extra Lean Ground Turkey 1/2 cup 150 20   90-95% Lean [place] Burger 1 david 140-180 21   Low-fat casserole with lean meat 3/4 cup 200 17   Luncheon Meats                                                        (turkey, lean ham, roast beef, chicken) 3 ounces 100 21   Egg (boiled, poached, scrambled) 1 Egg 60 7   Egg Substitute 1/2 cup 70 10   Nuts (limit to 1 serving per day)  3 Tbsp. 150 7   Nut Northridge (peanut, almond)  Limit to 1 serving or less daily 1 Tbsp. 90 4   Soy Burger (varies) 1  15   Garbanzo, Black, [place] Beans 1/2 cup 110 7   Refried Beans 1/2 cup 100 7   Kidney and Lima beans 1/2 cup 110 7   Tempeh 3 oz 175 18   Vegan crumbles 1/2 cup 100 14   Tofu 1/2 cup 110 14   Chili (beans and extra lean beef or turkey) 1 cup 200 23   Lentil Stew/Soup 1 cup 150 12   Black Bean Soup 1 cup 175 12

## 2023-02-16 ENCOUNTER — TELEPHONE (OUTPATIENT)
Dept: SURGERY | Facility: CLINIC | Age: 43
End: 2023-02-16
Payer: COMMERCIAL

## 2023-02-16 NOTE — TELEPHONE ENCOUNTER
Prior Authorization Retail Medication Request    Medication/Dose: Wegovy titrating dose, starting at 0.25mg injections weekly for a month and then increasing monthly if tolerated to a max dose of 2.4mg (0.25mg, 0.5mg, 1mg, 1.7mg, 2.4mg)    Previously Tried and Failed:  Phentermine loss of efficacy  Rationale:  Given loss of efficacy of phentermine and persistent BMI of 47 we'll stop the phentermine and add Wegovy, history of RNY    Clay: [id number]    Pharmacy Information (if different than what is on RX)  Name:  [facility]  Phone:  [phone]

## 2023-02-21 NOTE — TELEPHONE ENCOUNTER
PA Initiation    Medication: Semaglutide-Weight Management (WEGOVY) 0.25 MG/0.5ML SOAJ - Initiated  Insurance Company: [employer] - Phone [phone] Fax [phone]  Pharmacy Filling the Rx: [id number] [place], [place] - [address]  Filling Pharmacy Phone: [phone]  Filling Pharmacy Fax: [phone]  Start Date: 2/21/2023

## 2023-02-23 NOTE — TELEPHONE ENCOUNTER
Called plan, per rep case has been received and is in process. Has been marked urgent. Hoping for determination by tomorrow but may not be made until next week.

## 2023-02-24 NOTE — TELEPHONE ENCOUNTER
Received request for additional info just before 6PM on 02/23 - fax did not fully come over and it was too late to call to ask for it to be resent. Denial followed shortly after, this fax also did not fully come over. Called plan this morning asking for denial letter to be resent, informed that we did not have time to respond to the question set, plan advised that we could just send in our response to the question set instead of appealing right away.    Faxed response to plan.

## 2023-02-28 NOTE — TELEPHONE ENCOUNTER
Prior Authorization Approval    Authorization Effective Date: 2/28/2023  Authorization Expiration Date: 8/27/2023  Medication: Semaglutide-Weight Management (WEGOVY) 0.25 MG/0.5ML SOAJ - Approved  Approved Dose/Quantity:   Reference #: [id number]   Insurance Company: [employer] - Phone [phone] Fax [phone]    Which Pharmacy is filling the prescription (Not needed for infusion/clinic administered): [id number] - [place], [place] - [address] [place] [address]  Pharmacy Notified: Yes  Patient Notified: Yes

## 2023-02-28 NOTE — TELEPHONE ENCOUNTER
Spoke with plan. PA still under review, confirmed receipt of additional information. Estimated decision date is Friday March 3rd.

## 2023-03-07 ENCOUNTER — OFFICE VISIT (OUTPATIENT)
Dept: ORTHOPEDICS | Facility: CLINIC | Age: 43
End: 2023-03-07
Payer: COMMERCIAL

## 2023-03-07 ENCOUNTER — ANCILLARY PROCEDURE (OUTPATIENT)
Dept: GENERAL RADIOLOGY | Facility: CLINIC | Age: 43
End: 2023-03-07
Attending: PEDIATRICS
Payer: COMMERCIAL

## 2023-03-07 VITALS — BODY MASS INDEX: 46.44 KG/M2 | HEIGHT: 61 IN | WEIGHT: 246 LBS | HEART RATE: 77 BPM

## 2023-03-07 DIAGNOSIS — M25.562 LEFT KNEE PAIN: ICD-10-CM

## 2023-03-07 DIAGNOSIS — G89.29 CHRONIC PAIN OF LEFT KNEE: ICD-10-CM

## 2023-03-07 DIAGNOSIS — M25.562 CHRONIC PAIN OF LEFT KNEE: ICD-10-CM

## 2023-03-07 DIAGNOSIS — M17.12 PRIMARY OSTEOARTHRITIS OF LEFT KNEE: Primary | ICD-10-CM

## 2023-03-07 PROCEDURE — 99203 OFFICE O/P NEW LOW 30 MIN: CPT | Performed by: PEDIATRICS

## 2023-03-07 PROCEDURE — 73562 X-RAY EXAM OF KNEE 3: CPT | Mod: TC | Performed by: STUDENT IN AN ORGANIZED HEALTH CARE EDUCATION/TRAINING PROGRAM

## 2023-03-07 NOTE — Clinical Note
3/7/2023         RE: Leah John  [address] [place] [address]        Dear Colleague,    Thank you for referring your patient, Leah John, to the [facility]. Please see a copy of my visit note below.    ASSESSMENT & PLAN    Leah was seen today for pain.    Diagnoses and all orders for this visit:    Primary osteoarthritis of left knee  -     Physical Therapy Referral; Future    Chronic pain of left knee  -     XR Knee Standing AP Bilat [place] Bilat Lat Left; Future  -     Physical Therapy Referral; Future      Sounds like chronic instability, potential for remote ligament injury, now with degenerative arthrosis in both knees, left side symptomatic.  Following discussion of options, she is interested in physical therapy next.  Injection may be future consideration, depending on course.  Consider imaging guided injection, if done.  Questions answered. Discussed signs and symptoms that may indicate more serious issues; the patient was instructed to seek appropriate care if noted. Leah indicates understanding of these issues and agrees with the plan.      See Patient Instructions  Patient Instructions   Discussed nature of degenerative arthrosis of the knee.  Symptom treatment generally includes over-the-counter medications, ice or heat, topical treatments, and rest if needed.  May use compression or bracing for comfort.  Discussed potential benefits of rehabilitation, to maintain or improve function at the knee. Physical therapy referral placed.  Discussed benefits of exercise and weight loss (if applicable) to reduce pressure at the knee.  Discussed injection therapy. This includes steroid vs viscosupplement. If interested in injection, contact clinic.  Also briefly discussed future consideration of referral to orthopedic surgery for further evaluation and discussion of additional treatment options.    Will leave follow-up open ended for now.  "Contact clinic if any questions/concerns.    If you have any further questions for your physician or physician s care team you can contact them thru MyChart or by calling  [phone] and use option 3 to leave a voice message.   Messages received during business hours will be returned same day.            DO BRENT Nolan [facility]    -----  Chief Complaint   Patient presents with     Left Knee - Pain       SUBJECTIVE  Leah John is a/an 42 year old female who is seen as a self referral for evaluation of left knee pain.     The patient is seen by themselves.    Onset: Initial injury 26+ years ago. She was jumping on a trampoline. Knee pain began after that. No recent injury. She has tried PT- but not getting better. (10+ sessions)   Location of Pain: medial knee pain, posterior, left knee   Worsened by: extension   Better with: pillow underneath the knee   Treatments tried: Tylenol, home exercises and physical therapy  Associated symptoms: feeling of instability feels a popping with flexion and extension, locking, feeling of giving out    Orthopedic/Surgical history: NO  Social History/Occupation:      **  Has noted ongoing limited flexion in left knee since injury.  Pain has been increasing over time, gradually.  Currently seated in clinic not as much pain. However, has noted random pains in left knee at times with some motions/activities. May limp at times.  + joint noise, notes crepitus.  No recent PT; has sone some PT in past. No PT recently.  No bracing.      REVIEW OF SYSTEMS:  Review of Systems      OBJECTIVE:  Pulse 77   Ht 1.549 m (5' 1\")   Wt 111.6 kg (246 lb)   BMI 46.48 kg/m           Left Knee exam    Inspection:   No clear effusion, somewhat difficult to assess with habitus   No ecchymosis    ROM:      Flexion  degrees       Extension lacking few degrees       Range of motion limited by stiffness    Patellar Motion:      Crepitus " noted in the joint with motion    Tender:      medial joint line    Special Tests:      Increased anterior translation with anterior drawer       neg (-) posterior drawer       neg (-) varus       neg (-) valgus        RADIOLOGY:  I independently ordered, visualized and reviewed these images with the patient  Bilateral knee arthrosis. No acute bony abnormality noted.    Recent Results (from the past 24 hour(s))   XR Knee Standing AP Bilat [place] Bilat Lat Left    Narrative    XR KNEE STANDING AP [place] BILAT LAT LEFT 3/7/2023 10:37 AM     HISTORY: Left knee pain    COMPARISON: None.       Impression    IMPRESSION:    Left knee: Moderate tricompartmental osteoarthrosis. No acute  fracture. No significant joint effusion.    Right knee: Moderate tricompartmental osteoarthrosis. No acute  fracture. Slightly limited evaluation due to motion on the AP view.    FAIZAN RHODES MD         SYSTEM ID:  [id number]                 Again, thank you for allowing me to participate in the care of your patient.        Sincerely,        Jose Clark DO

## 2023-03-07 NOTE — PATIENT INSTRUCTIONS
Discussed nature of degenerative arthrosis of the knee.  Symptom treatment generally includes over-the-counter medications, ice or heat, topical treatments, and rest if needed.  May use compression or bracing for comfort.  Discussed potential benefits of rehabilitation, to maintain or improve function at the knee. Physical therapy referral placed.  Discussed benefits of exercise and weight loss (if applicable) to reduce pressure at the knee.  Discussed injection therapy. This includes steroid vs viscosupplement. If interested in injection, contact clinic.  Also briefly discussed future consideration of referral to orthopedic surgery for further evaluation and discussion of additional treatment options.    Will leave follow-up open ended for now. Contact clinic if any questions/concerns.    If you have any further questions for your physician or physician s care team you can contact them thru [employer] or by calling  [phone] and use option 3 to leave a voice message.   Messages received during business hours will be returned same day.

## 2023-03-07 NOTE — PROGRESS NOTES
ASSESSMENT & PLAN    Leah was seen today for pain.    Diagnoses and all orders for this visit:    Primary osteoarthritis of left knee  -     Physical Therapy Referral; Future    Chronic pain of left knee  -     XR Knee Standing AP Bilat [place] Bilat Lat Left; Future  -     Physical Therapy Referral; Future      Sounds like chronic instability, potential for remote ligament injury, now with degenerative arthrosis in both knees, left side symptomatic.  Following discussion of options, she is interested in physical therapy next.  Injection may be future consideration, depending on course.  Consider imaging guided injection, if done.  Questions answered. Discussed signs and symptoms that may indicate more serious issues; the patient was instructed to seek appropriate care if noted. Leah indicates understanding of these issues and agrees with the plan.      See Patient Instructions  Patient Instructions   Discussed nature of degenerative arthrosis of the knee.  Symptom treatment generally includes over-the-counter medications, ice or heat, topical treatments, and rest if needed.  May use compression or bracing for comfort.  Discussed potential benefits of rehabilitation, to maintain or improve function at the knee. Physical therapy referral placed.  Discussed benefits of exercise and weight loss (if applicable) to reduce pressure at the knee.  Discussed injection therapy. This includes steroid vs viscosupplement. If interested in injection, contact clinic.  Also briefly discussed future consideration of referral to orthopedic surgery for further evaluation and discussion of additional treatment options.    Will leave follow-up open ended for now. Contact clinic if any questions/concerns.    If you have any further questions for your physician or physician s care team you can contact them thru [employer] or by calling  [phone] and use option 3 to leave a voice message.   Messages received during business hours  "will be returned same day.            DO BRENT Nolan [facility]    -----  Chief Complaint   Patient presents with     Left Knee - Pain       SUBJECTIVE  Leah John is a/an 42 year old female who is seen as a self referral for evaluation of left knee pain.     The patient is seen by themselves.    Onset: Initial injury 26+ years ago. She was jumping on a trampoline. Knee pain began after that. No recent injury. She has tried PT- but not getting better. (10+ sessions)   Location of Pain: medial knee pain, posterior, left knee   Worsened by: extension   Better with: pillow underneath the knee   Treatments tried: Tylenol, home exercises and physical therapy  Associated symptoms: feeling of instability feels a popping with flexion and extension, locking, feeling of giving out    Orthopedic/Surgical history: NO  Social History/Occupation:      **  Has noted ongoing limited flexion in left knee since injury.  Pain has been increasing over time, gradually.  Currently seated in clinic not as much pain. However, has noted random pains in left knee at times with some motions/activities. May limp at times.  + joint noise, notes crepitus.  No recent PT; has sone some PT in past. No PT recently.  No bracing.      REVIEW OF SYSTEMS:  Review of Systems      OBJECTIVE:  Pulse 77   Ht 1.549 m (5' 1\")   Wt 111.6 kg (246 lb)   BMI 46.48 kg/m           Left Knee exam    Inspection:   No clear effusion, somewhat difficult to assess with habitus   No ecchymosis    ROM:      Flexion  degrees       Extension lacking few degrees       Range of motion limited by stiffness    Patellar Motion:      Crepitus noted in the joint with motion    Tender:      medial joint line    Special Tests:      Increased anterior translation with anterior drawer       neg (-) posterior drawer       neg (-) varus       neg (-) valgus        RADIOLOGY:  I independently ordered, visualized and " reviewed these images with the patient  Bilateral knee arthrosis. No acute bony abnormality noted.    Recent Results (from the past 24 hour(s))   XR Knee Standing AP Bilat [place] Bilat Lat Left    Narrative    XR KNEE STANDING AP [place] BILAT LAT LEFT 3/7/2023 10:37 AM     HISTORY: Left knee pain    COMPARISON: None.       Impression    IMPRESSION:    Left knee: Moderate tricompartmental osteoarthrosis. No acute  fracture. No significant joint effusion.    Right knee: Moderate tricompartmental osteoarthrosis. No acute  fracture. Slightly limited evaluation due to motion on the AP view.    FAIZAN RHODES MD         SYSTEM ID:  [id number]

## 2023-03-15 ENCOUNTER — THERAPY VISIT (OUTPATIENT)
Dept: PHYSICAL THERAPY | Facility: CLINIC | Age: 43
End: 2023-03-15
Attending: PEDIATRICS
Payer: COMMERCIAL

## 2023-03-15 DIAGNOSIS — M25.562 CHRONIC PAIN OF LEFT KNEE: ICD-10-CM

## 2023-03-15 DIAGNOSIS — G89.29 CHRONIC PAIN OF LEFT KNEE: ICD-10-CM

## 2023-03-15 DIAGNOSIS — M17.12 PRIMARY OSTEOARTHRITIS OF LEFT KNEE: ICD-10-CM

## 2023-03-15 PROCEDURE — 97110 THERAPEUTIC EXERCISES: CPT | Mod: GP | Performed by: PHYSICAL THERAPIST

## 2023-03-15 PROCEDURE — 97161 PT EVAL LOW COMPLEX 20 MIN: CPT | Mod: GP | Performed by: PHYSICAL THERAPIST

## 2023-03-15 ASSESSMENT — ACTIVITIES OF DAILY LIVING (ADL)
LIMPING: THE SYMPTOM AFFECTS MY ACTIVITY MODERATELY
RAW_SCORE: 42
KNEE_ACTIVITY_OF_DAILY_LIVING_SUM: 42
GO UP STAIRS: ACTIVITY IS SOMEWHAT DIFFICULT
GIVING WAY, BUCKLING OR SHIFTING OF KNEE: THE SYMPTOM AFFECTS MY ACTIVITY MODERATELY
KNEE_ACTIVITY_OF_DAILY_LIVING_SCORE: 60
STIFFNESS: THE SYMPTOM AFFECTS MY ACTIVITY SLIGHTLY
STAND: ACTIVITY IS NOT DIFFICULT
PAIN: THE SYMPTOM AFFECTS MY ACTIVITY SLIGHTLY
WEAKNESS: THE SYMPTOM AFFECTS MY ACTIVITY SLIGHTLY
KNEEL ON THE FRONT OF YOUR KNEE: ACTIVITY IS VERY DIFFICULT
HOW_WOULD_YOU_RATE_THE_CURRENT_FUNCTION_OF_YOUR_KNEE_DURING_YOUR_USUAL_DAILY_ACTIVITIES_ON_A_SCALE_FROM_0_TO_100_WITH_100_BEING_YOUR_LEVEL_OF_KNEE_FUNCTION_PRIOR_TO_YOUR_INJURY_AND_0_BEING_THE_INABILITY_TO_PERFORM_ANY_OF_YOUR_USUAL_DAILY_ACTIVITIES?: 2
SQUAT: ACTIVITY IS FAIRLY DIFFICULT
SIT WITH YOUR KNEE BENT: ACTIVITY IS NOT DIFFICULT
RISE FROM A CHAIR: ACTIVITY IS FAIRLY DIFFICULT
SWELLING: I DO NOT HAVE THE SYMPTOM
WALK: ACTIVITY IS MINIMALLY DIFFICULT
GO DOWN STAIRS: ACTIVITY IS FAIRLY DIFFICULT

## 2023-03-15 NOTE — PROGRESS NOTES
[facility] Physical [facility] Initial Evaluation  3/15/2023     Precautions/Restrictions/MD instructions: eval and treat    Therapist Assessment: Leah John is a 42 year old female patient presenting to Physical Therapy with left knee pain. Patient demonstrates decreased bilateral knee extension (L>R), decreased L knee flexion, pain with overpressure L knee flexion and extension, + pain at medial joint line L knee. Signs and symptoms are consistent with L knee degenerative meniscal tear. These impairments limit their ability to squat, kneel, sit cross-legged, get up from floor, stairs. Skilled PT services are necessary in order to reduce impairments and improve independent function.    Subjective History    Injury/Condition Details:  Presenting Complaint Left knee pain   Onset Timing/Date 26 years ago, (Doctor's referral 3/7/2023)   Mechanism 27 year old trampoline accident - worse in past 2 months      Symptom Behavior Details    Primary Symptoms Constant symptoms; worsen with activity, pain (Location: Left medial and posterior knee, Quality: Burning and Aching/Throbbing), stiffness, weakness, buckling/shifting/giving way       Denies locking, catching, giving way, or instability. Denies numbness, tingling, changes in sensation. Denies having related symptoms spreading to the R calf.    Worst Pain 7/10 (with buckling and locking)   Symptom Provocators Kneeling, squatting, stairs up and down    Best Pain 3/10    Symptom Relievers Rest    Time of day dependent? No   Recent symptom change? no change in symptoms     Prior Testing/Intervention for current condition:  Prior Tests  x-ray   Prior Treatment PT  and none     Lifestyle & General Medical History:  Employment  - walking and lifting    Usual physical activities  (within past year) Biking, playing outside, walking    Orthopaedic History  Knee pain for about 30 years    Medication  Anti-depressants   Notable medical history  Anemia, concussion/dizziness, depression, overweight   Patient goals Squat down, get to/from floor without pain   Patient Reported Health good   Red Flags: (Bold when present) - reviewed the following and denies  Malaise, unexplained weight loss, night pain, fever    Answers for HPI/ROS submitted by the patient on 3/14/2023  Reason for Visit:: Knee pain - dx with arthritis, looking to get exercises to help manage pain.  Number scale: 5/10  General health as reported by patient: good  Please check all that apply to your current or past medical history: anemia, concussions/dizziness, depression, overweight  Medical allergies: none  Surgeries: other  Other Surgery Detail: gastic bypass, , eye, wrist  Medications you are currently taking: anti-depressants, other  Other Meds Detail: Wegovy for weight loss  Occupation::   What are your primary job tasks: computer work, prolonged sitting, repetitive tasks               PHYSICAL THERAPY KNEE EXAM     Dynamic Movement Screen:  DL Squat: Anterior knee translation, Knee valgus, Hip internal rotation and Improper use of glutes/hips    1 leg stance:   Right: proprioceptive challenge  Left: proprioceptive challenge and pain    1 leg squat:   Right: proprioceptive challenge and excessive contralateral pelvic drop  Left: proprioceptive challenge, excessive contralateral pelvic drop, excessive femoral IR/ADD, excessive anterior knee excursion and pain    Gait: Knee flexion at initial contact increased bilaterally       (*Indicates patient s pain)  Knee PROM L  R   Hyperextension 0 0   Extension 15 5   Flexion 100 115   (*Indicates patient s pain)    Hip PROM:     L R   IR 20 20   ER 30 30   Mague's - -   Jose De Jesus - -       Special tests:   L R   Anterior Drawer - -   Posterior Drawer - -   Lachman's - -   Valgus 0 degrees - -   Valgus 30 degrees - -   Varus 0 degrees - -   Varus 30 degrees - -   Jorge's + -   Appley's + -   Lateral Compression - -   Patellar  Compression - -   (*Indicates patient's pain)    Resisted Tests Strength (MMT)    L R   Knee Ext 4/5 5/5   Knee Flex 4/5 4/5   Hip ABD 3+/5 3+/5   Hip Flex 5/5 5/5   Hip EXT 4-/5 4/5   (*Indicates patient s pain)    Patellar tracking: increased laterality of L patella     Other:  Joint Line Swelling: na  Knee Joint Effusion (Stroke Test Assessment):  Right: 0  Left: 0  ASSESSMENT/PLAN:  The patient is a 42 year old female with chief complaint of L knee pain.    The patient has the following significant findings with corresponding treatment plan.  Diagnosis 1:  Signs and symptoms consistent with L degenerative meniscal tear     Pain -  manual therapy, splint/taping/bracing/orthotics, self management, education, home program and neuro re-education   Decreased ROM/flexibility - manual therapy, therapeutic exercise and home program  Decreased joint mobility - manual therapy, therapeutic exercise and home program  Decreased strength - therapeutic exercise, therapeutic activities and home program  Impaired balance - neuro re-education, therapeutic activities and home program  Decreased proprioception - neuro re-education and therapeutic activities  Impaired gait - gait training and assistive devices  Impaired muscle performance - neuro re-education and home program  Decreased function - therapeutic activities and home program  Impaired posture - neuro re-education, therapeutic activities and home program  Instability -  Therapeutic Activity, Therapeutic Exercise, Neuromuscular Re-education, Splinting/Taping/Bracing/Orthotic, home program      Therapy Evaluation Codes:   1) History comprised of:   Personal factors that impact the plan of care:      Time since onset of symptoms.    Comorbidity factors that impact the plan of care are:      Concussion, Depression and Overweight.     Medications impacting care: Anti-depressant.  2) Examination of Body Systems comprised of:   Body structures and functions that impact the plan  of care:      Knee.   Activity limitations that impact the plan of care are:      Dressing, Lifting, Squatting/kneeling and Stairs.  3) Clinical presentation characteristics are:   Stable/Uncomplicated.  4) Decision-Making    Low complexity using standardized patient assessment instrument and/or measureable assessment of functional outcome.  Cumulative Therapy Evaluation is: Low complexity.    Previous and current functional limitations:  (See Goal Flow Sheet for this information)    Short term and Long term goals: (See Goal Flow Sheet for this information)     Communication ability:  Patient appears to be able to clearly communicate and understand verbal and written communication and follow directions correctly.  Treatment Explanation - The following has been discussed with the patient:   RX ordered/plan of care  Anticipated outcomes  Possible risks and side effects  This patient would benefit from PT intervention to resume normal activities.   Rehab potential is fair.    Frequency:  1 X week, once daily  Duration:  for 6 visits  Discharge Plan: Achieve all LTGs, be independent in home treatment program, and reach maximal therapeutic benefit.    Please refer to the daily flowsheet for treatment today, total treatment time and time spent performing 1:1 timed codes.

## 2023-03-22 ENCOUNTER — THERAPY VISIT (OUTPATIENT)
Dept: PHYSICAL THERAPY | Facility: CLINIC | Age: 43
End: 2023-03-22
Attending: PEDIATRICS
Payer: COMMERCIAL

## 2023-03-22 DIAGNOSIS — G89.29 CHRONIC PAIN OF LEFT KNEE: Primary | ICD-10-CM

## 2023-03-22 DIAGNOSIS — M25.562 CHRONIC PAIN OF LEFT KNEE: Primary | ICD-10-CM

## 2023-03-22 PROCEDURE — 97110 THERAPEUTIC EXERCISES: CPT | Mod: GP | Performed by: PHYSICAL THERAPIST

## 2023-03-22 PROCEDURE — 97112 NEUROMUSCULAR REEDUCATION: CPT | Mod: GP | Performed by: PHYSICAL THERAPIST

## 2023-03-22 PROCEDURE — 97140 MANUAL THERAPY 1/> REGIONS: CPT | Mod: GP | Performed by: PHYSICAL THERAPIST

## 2023-04-12 ENCOUNTER — THERAPY VISIT (OUTPATIENT)
Dept: PHYSICAL THERAPY | Facility: CLINIC | Age: 43
End: 2023-04-12
Payer: COMMERCIAL

## 2023-04-12 DIAGNOSIS — M25.562 CHRONIC PAIN OF LEFT KNEE: Primary | ICD-10-CM

## 2023-04-12 DIAGNOSIS — G89.29 CHRONIC PAIN OF LEFT KNEE: Primary | ICD-10-CM

## 2023-04-12 PROCEDURE — 97110 THERAPEUTIC EXERCISES: CPT | Mod: GP | Performed by: PHYSICAL THERAPIST

## 2023-04-12 PROCEDURE — 97140 MANUAL THERAPY 1/> REGIONS: CPT | Mod: GP | Performed by: PHYSICAL THERAPIST

## 2023-04-12 ASSESSMENT — ACTIVITIES OF DAILY LIVING (ADL)
RISE FROM A CHAIR: ACTIVITY IS NOT DIFFICULT
AS_A_RESULT_OF_YOUR_KNEE_INJURY,_HOW_WOULD_YOU_RATE_YOUR_CURRENT_LEVEL_OF_DAILY_ACTIVITY?: NEARLY NORMAL
PAIN: I DO NOT HAVE THE SYMPTOM
KNEE_ACTIVITY_OF_DAILY_LIVING_SUM: 61
KNEEL ON THE FRONT OF YOUR KNEE: ACTIVITY IS SOMEWHAT DIFFICULT
RAW_SCORE: 61
GIVING WAY, BUCKLING OR SHIFTING OF KNEE: I HAVE THE SYMPTOM BUT IT DOES NOT AFFECT MY ACTIVITY
HOW_WOULD_YOU_RATE_THE_CURRENT_FUNCTION_OF_YOUR_KNEE_DURING_YOUR_USUAL_DAILY_ACTIVITIES_ON_A_SCALE_FROM_0_TO_100_WITH_100_BEING_YOUR_LEVEL_OF_KNEE_FUNCTION_PRIOR_TO_YOUR_INJURY_AND_0_BEING_THE_INABILITY_TO_PERFORM_ANY_OF_YOUR_USUAL_DAILY_ACTIVITIES?: 85
STIFFNESS: I HAVE THE SYMPTOM BUT IT DOES NOT AFFECT MY ACTIVITY
SQUAT: ACTIVITY IS MINIMALLY DIFFICULT
HOW_WOULD_YOU_RATE_THE_OVERALL_FUNCTION_OF_YOUR_KNEE_DURING_YOUR_USUAL_DAILY_ACTIVITIES?: NEARLY NORMAL
GO DOWN STAIRS: ACTIVITY IS SOMEWHAT DIFFICULT
GO UP STAIRS: ACTIVITY IS MINIMALLY DIFFICULT
LIMPING: I DO NOT HAVE THE SYMPTOM
STAND: ACTIVITY IS NOT DIFFICULT
WEAKNESS: I HAVE THE SYMPTOM BUT IT DOES NOT AFFECT MY ACTIVITY
KNEE_ACTIVITY_OF_DAILY_LIVING_SCORE: 87.14
SIT WITH YOUR KNEE BENT: ACTIVITY IS NOT DIFFICULT
SWELLING: I DO NOT HAVE THE SYMPTOM
WALK: ACTIVITY IS NOT DIFFICULT

## 2023-04-12 NOTE — PROGRESS NOTES
DISCHARGE REPORT    Progress reporting period is from 3/15/2023 to 4/12/2023.       SUBJECTIVE  Subjective changes noted by patient: Subjective: Patient states that her knee is feeling the best it has in years, and is able to get on floor with 4 year old daughter with minimal to no discomfort. She doesn't have pain any longer, mostly just feeling of stiffness. She feels ready to discharge from PT with use of HEP, and will check in if needed with PT    Current pain level is Current Pain level: 0/10.     Previous pain level was   .   Changes in function:  Yes (See Goal flowsheet attached for changes in current functional level)  Adverse reaction to treatment or activity: None    OBJECTIVE  Changes noted in objective findings:  Yes,   Objective: L knee AROM: 0-5-115     ASSESSMENT/PLAN  Updated problem list and treatment plan: Diagnosis 1:  Signs and symptoms consistent with mechanical knee pain  Pain -  home program  Decreased ROM/flexibility - home program  Decreased joint mobility - home program  Decreased strength - home program  Decreased function - home program  STG/LTGs have been met or progress has been made towards goals:  Yes (See Goal flow sheet completed today.)  Assessment of Progress: The patient's condition is improving.  The patient has met all of their long term goals.  Self Management Plans:  Patient has been instructed in a home treatment program.  Patient is independent in a home treatment program.  Patient  has been instructed in self management of symptoms.  Patient is independent in self management of symptoms.  I have re-evaluated this patient and find that the nature, scope, duration and intensity of the therapy is appropriate for the medical condition of the patient.  Leah continues to require the following intervention to meet STG and LTG's:  PT intervention is no longer required to meet STG/LTG.    Recommendations:  This patient is ready to be discharged from therapy and continue their  home treatment program. Patient to follow up with PT or MD as needed    Please refer to the daily flowsheet for treatment today, total treatment time and time spent performing 1:1 timed codes.

## 2023-05-17 ENCOUNTER — VIRTUAL VISIT (OUTPATIENT)
Dept: SURGERY | Facility: CLINIC | Age: 43
End: 2023-05-17
Payer: COMMERCIAL

## 2023-05-17 VITALS — WEIGHT: 238 LBS | HEIGHT: 61 IN | BODY MASS INDEX: 44.93 KG/M2

## 2023-05-17 DIAGNOSIS — K91.2 POSTOPERATIVE MALABSORPTION: ICD-10-CM

## 2023-05-17 DIAGNOSIS — E66.01 CLASS 3 SEVERE OBESITY DUE TO EXCESS CALORIES WITH BODY MASS INDEX (BMI) OF 40.0 TO 44.9 IN ADULT, UNSPECIFIED WHETHER SERIOUS COMORBIDITY PRESENT (H): Primary | ICD-10-CM

## 2023-05-17 DIAGNOSIS — D50.8 OTHER IRON DEFICIENCY ANEMIA: ICD-10-CM

## 2023-05-17 DIAGNOSIS — E66.813 CLASS 3 SEVERE OBESITY DUE TO EXCESS CALORIES WITH BODY MASS INDEX (BMI) OF 40.0 TO 44.9 IN ADULT, UNSPECIFIED WHETHER SERIOUS COMORBIDITY PRESENT (H): Primary | ICD-10-CM

## 2023-05-17 PROCEDURE — 99214 OFFICE O/P EST MOD 30 MIN: CPT | Mod: VID | Performed by: EMERGENCY MEDICINE

## 2023-05-17 NOTE — LETTER
5/17/2023         RE: Leah John  [address] [place] [address]        Dear Colleague,    Thank you for referring your patient, Leah John, to the [facility] SURGERY CLINIC AND BARIATRICS CARE [place]. Please see a copy of my visit note below.    Leah John is 42 year old  female who presents for a billable video visit today.    How would you like to obtain your AVS? MyChart  If dropped from the video visit, the video invitation should be resent by: Send to e-mail at: [email or web address]  Will anyone else be joining your video visit? No      Video Start Time: 10:30 am    Are there any specific questions or needs that you would like addressed at your visit today?     Weight management. Refill of Wegovy.    Inocencia Hill CMA  [facility]  Surgery [facility]  Weight Management Clinic [facility]  [address]  [place] [address]  Office: [phone]  Fax: [phone]          Provider Notes:   Bariatric Clinic Follow-Up Visit:    Leah John is a 42 year old  female with Body mass index is 44.97 kg/m .  presenting here today for follow-up on non-surgical efforts for weight loss. Hx of RNY gastric bypass 7/15/15 with starting weight of 311 lbs, BMI of 59.  She started some phentermine support in march 2022 with weight of  with a weight of 270lbs and we transitioned to Wegovy at our visit in February of 2023 at 246 lbs, BMI of 47.3 due to stalling out of weight loss .  Along with diet and behavior changes, she has been using Wegovy the last 3 months to assist her weight loss goals.  See her intake visit notes for details on identified contributors to weight gain in the past.   Weight:   Wt Readings from Last 5 Encounters:   05/17/23 108 kg (238 lb)   03/07/23 111.6 kg (246 lb)   02/15/23 111.6 kg (246 lb)   10/31/22 113.4 kg (250 lb)   08/01/22 121.1 kg (266 lb 14.4 oz)     pounds      Comorbidities:  Patient Active Problem List   Diagnosis     Knee pain     Morbid obesity (H)     Anisometropia     Anxiety     Hyperlipidemia with target LDL less than 160     Hyperparathyroidism (H)     Hypothyroidism     Iron deficiency anemia     Low vitamin B12 level     Metabolic syndrome     MTHFR mutation     PCOS (polycystic ovarian syndrome)     Pre-diabetes     S/P bariatric surgery     Vitamin D deficiency     Morbid obesity with BMI of 50.0-59.9, adult (H)     ALFRED III (cervical intraepithelial neoplasia III)     Stress fracture of right foot, initial encounter     Plantar fasciitis       Current Outpatient Medications:      calcium citrate (CITRACAL) 950 (200 Ca) MG tablet, Take 1 tablet by mouth 2 times daily, Disp: , Rfl:      ferrous fumarate 65 mg, [place]. FE,-Vitamin C 125 mg (VITRON-C)  MG TABS tablet, Use twice daily, 20minutes after meals for 30 days then reduce to once daily dosing thereafter for 2 months until gone., Disp: 120 tablet, Rfl: 0     FLUoxetine (PROZAC) 20 MG capsule, Take 1 capsule (20 mg) by mouth daily, Disp: 90 capsule, Rfl: 1     Multiple Vitamin (MULTIVITAMIN ADULT PO), , Disp: , Rfl:      Semaglutide-Weight Management (WEGOVY) 2.4 MG/0.75ML SOAJ, Inject 2.4 mg Subcutaneous every 7 days for 270 days 4 pens, Disp: 3 mL, Rfl: 9     vitamin B-12 (CYANOCOBALAMIN) 500 MCG tablet, , Disp: , Rfl:      Vitamin D (Cholecalciferol) 25 MCG (1000 UT) TABS, , Disp: , Rfl:       Interim: Since our last visit, she has reported an 8 lb reduction in weight on Wegovy and finds the dose of 1.7mg/week.  Moving easier, did some PT this winter on her knee. Pain is gone now.   Got stair stepper for 25 minutes 5 days weekly in her cubical. Using regularly the last week.   Vitamins are good.   Will be camping this summer.   Plan:   1.  Diet: aiming for protein first approach at meals to help both bariatric method and avoiding protein malnourishment while on Wegovy.  Continue to hydrate  well between meals to feel your best, hitting 64-80oz/day goal.  2. Exercise: good work with stair stepper  3. Medication: continue ramping process and once up to 2.4mg/week dose we'll look to maintain that dose until your insurance allows, ideally at least 6-9 months at that top dose.   4. Continue good vitamin support and iron supplementation. SSS tonic or Vitamin Code healthy blood may be helpful for some iron support to supplement your 18mg/serving multivitamins  5. Goals: maintaining now a 73 lb reduction in weight from pre surgery and down about 32 lbs from last year when increased focus on weight related health. 12-13% reduction in weight over this last year, a meaningful amount. Goal this year is to help stabilize BMI under 35.   6. Plan to recheck bariatric labs after our next visit.      We discussed [facility] Bariatric Basics including:  -eating 3 meals daily  -reviewed metabolic needs for weight loss based on Resting Metabolic Rate  -protein goals supportive of healthy weight loss  -avoiding/limiting calorie containing beverages  -We discussed the importance of restorative sleep and stress management in maintaining a healthy weight.  -We discussed the National Weight Control Registry healthy weight maintenance strategies and ways to optimize metabolism.  -We discussed the importance of physical activity including cardiovascular and strength training in maintaining a healthier weight and explored viable options.      Most recent labs:  Lab Results   Component Value Date    WBC 6.9 10/05/2022    HGB 10.5 (L) 10/05/2022    HCT 33.1 (L) 10/05/2022    MCV 75 (L) 10/05/2022     10/05/2022     Lab Results   Component Value Date    CHOL 180 10/31/2022     Lab Results   Component Value Date    HDL 54 10/31/2022     No components found for: LDLCALC  Lab Results   Component Value Date    TRIG 78 10/31/2022     No results found for: CHOLHDL  Lab Results   Component Value Date    ALT 11 03/29/2022    AST 16  "03/29/2022    ALKPHOS 96 03/29/2022     No results found for: HGBA1C  Lab Results   Component Value Date    B12 738 03/29/2022     No components found for: VITDT1  Lab Results   Component Value Date    JAMAR 15 10/05/2022     Lab Results   Component Value Date    PTHI 54 07/26/2022     Lab Results   Component Value Date    ZN 78.4 03/29/2022     Lab Results   Component Value Date    VIB1WB 101 03/29/2022     Lab Results   Component Value Date    TSH 4.57 03/29/2022     No results found for: TEST    DIETARY HISTORY  Tracking technique: regular fitness  Positive Changes Since Last Visit: moving more this last week  Struggling With: tolerance of iron support    Getting Adequate Protein: usually  Sleep schedule: n/a.      PHYSICAL ACTIVITY PATTERNS:  Cardiovascular: stepper daily for 25 minutes  Strength Training: same    REVIEW OF SYSTEMS  Tolerates Wegovy well. .  PHYSICAL EXAM:  Vitals: Ht 1.549 m (5' 1\")   Wt 108 kg (238 lb)   BMI 44.97 kg/m    Weight:   Wt Readings from Last 3 Encounters:   05/17/23 108 kg (238 lb)   03/07/23 111.6 kg (246 lb)   02/15/23 111.6 kg (246 lb)         GEN: Pleasant, well groomed, in no acute distress  HEENT:  Slightly pale complexion on video, no jaundice. Normal facies .  NECK: No swelling.  HEART: .  LUNGS: No respiratory difficulty noted. No cough. .  ABDOMEN: .  EXTREMITIES: No tremor. Seated at desk..  NEURO: Alert and Oriented X3, fluent speech. .  SKIN: No visible rashes. To face/hands..    Interim study results: none..      30 minutes spent by me on the date of the encounter doing chart review, history and exam, documentation and further activities per the note   Manoj Stover MD  [facility] Bariatric Care [facility]  10:41 AM  5/17/2023      Video-Visit Details    Type of service:  Video Visit    Platform used for Video Visit: [employer]    Video End Time (time video stopped): 11:10 AM    Originating Location (pt. Location): Home        Distant Location (provider location):  " On-site    Distant Location (provider location):  [facility] SURGERY [facility] AND BARIATRICS [facility]         Again, thank you for allowing me to participate in the care of your patient.        Sincerely,        Manoj Stover MD

## 2023-05-17 NOTE — PROGRESS NOTES
Leah John is 42 year old  female who presents for a billable video visit today.    How would you like to obtain your AVS? MyChart  If dropped from the video visit, the video invitation should be resent by: Send to e-mail at: [email or web address]  Will anyone else be joining your video visit? No      Video Start Time: 10:30 am    Are there any specific questions or needs that you would like addressed at your visit today?     Weight management. Refill of Wegovy.    Inocencia Hill  [facility]  Surgery Clinic [facility]  Weight Management Clinic [address]  [place] [address]  Office: [phone]  Fax: [phone]          Provider Notes:   Bariatric Clinic Follow-Up Visit:    Leah John is a 42 year old  female with Body mass index is 44.97 kg/m .  presenting here today for follow-up on non-surgical efforts for weight loss. Hx of RNY gastric bypass 7/15/15 with starting weight of 311 lbs, BMI of 59.  She started some phentermine support in march 2022 with weight of  with a weight of 270lbs and we transitioned to Wegovy at our visit in February of 2023 at 246 lbs, BMI of 47.3 due to stalling out of weight loss .  Along with diet and behavior changes, she has been using Wegovy the last 3 months to assist her weight loss goals.  See her intake visit notes for details on identified contributors to weight gain in the past.   Weight:   Wt Readings from Last 5 Encounters:   05/17/23 108 kg (238 lb)   03/07/23 111.6 kg (246 lb)   02/15/23 111.6 kg (246 lb)   10/31/22 113.4 kg (250 lb)   08/01/22 121.1 kg (266 lb 14.4 oz)    pounds      Comorbidities:  Patient Active Problem List   Diagnosis     Knee pain     Morbid obesity (H)     Anisometropia     Anxiety     Hyperlipidemia with target LDL less than 160     Hyperparathyroidism (H)     Hypothyroidism     Iron deficiency anemia     Low vitamin B12 level     Metabolic syndrome      MTHFR mutation     PCOS (polycystic ovarian syndrome)     Pre-diabetes     S/P bariatric surgery     Vitamin D deficiency     Morbid obesity with BMI of 50.0-59.9, adult (H)     ALFRED III (cervical intraepithelial neoplasia III)     Stress fracture of right foot, initial encounter     Plantar fasciitis       Current Outpatient Medications:      calcium citrate (CITRACAL) 950 (200 Ca) MG tablet, Take 1 tablet by mouth 2 times daily, Disp: , Rfl:      ferrous fumarate 65 mg, [place] FE,-Vitamin C 125 mg (VITRON-C)  MG TABS tablet, Use twice daily, 20minutes after meals for 30 days then reduce to once daily dosing thereafter for 2 months until gone., Disp: 120 tablet, Rfl: 0     FLUoxetine (PROZAC) 20 MG capsule, Take 1 capsule (20 mg) by mouth daily, Disp: 90 capsule, Rfl: 1     Multiple Vitamin (MULTIVITAMIN ADULT PO), , Disp: , Rfl:      Semaglutide-Weight Management (WEGOVY) 2.4 MG/0.75ML SOAJ, Inject 2.4 mg Subcutaneous every 7 days for 270 days 4 pens, Disp: 3 mL, Rfl: 9     vitamin B-12 (CYANOCOBALAMIN) 500 MCG tablet, , Disp: , Rfl:      Vitamin D (Cholecalciferol) 25 MCG (1000 UT) TABS, , Disp: , Rfl:       Interim: Since our last visit, she has reported an 8 lb reduction in weight on Wegovy and finds the dose of 1.7mg/week.  Moving easier, did some PT this winter on her knee. Pain is gone now.   Got stair stepper for 25 minutes 5 days weekly in her cubical. Using regularly the last week.   Vitamins are good.   Will be camping this summer.   Plan:   1.  Diet: aiming for protein first approach at meals to help both bariatric method and avoiding protein malnourishment while on Wegovy.  Continue to hydrate well between meals to feel your best, hitting 64-80oz/day goal.  2. Exercise: good work with stair stepper  3. Medication: continue ramping process and once up to 2.4mg/week dose we'll look to maintain that dose until your insurance allows, ideally at least 6-9 months at that top dose.   4. Continue good  vitamin support and iron supplementation. SSS tonic or Vitamin Code healthy blood may be helpful for some iron support to supplement your 18mg/serving multivitamins  5. Goals: maintaining now a 73 lb reduction in weight from pre surgery and down about 32 lbs from last year when increased focus on weight related health. 12-13% reduction in weight over this last year, a meaningful amount. Goal this year is to help stabilize BMI under 35.   6. Plan to recheck bariatric labs after our next visit.      We discussed [facility] Bariatric Basics including:  -eating 3 meals daily  -reviewed metabolic needs for weight loss based on Resting Metabolic Rate  -protein goals supportive of healthy weight loss  -avoiding/limiting calorie containing beverages  -We discussed the importance of restorative sleep and stress management in maintaining a healthy weight.  -We discussed the National Weight Control Registry healthy weight maintenance strategies and ways to optimize metabolism.  -We discussed the importance of physical activity including cardiovascular and strength training in maintaining a healthier weight and explored viable options.      Most recent labs:  Lab Results   Component Value Date    WBC 6.9 10/05/2022    HGB 10.5 (L) 10/05/2022    HCT 33.1 (L) 10/05/2022    MCV 75 (L) 10/05/2022     10/05/2022     Lab Results   Component Value Date    CHOL 180 10/31/2022     Lab Results   Component Value Date    HDL 54 10/31/2022     No components found for: LDLCALC  Lab Results   Component Value Date    TRIG 78 10/31/2022     No results found for: CHOLHDL  Lab Results   Component Value Date    ALT 11 03/29/2022    AST 16 03/29/2022    ALKPHOS 96 03/29/2022     No results found for: HGBA1C  Lab Results   Component Value Date    B12 738 03/29/2022     No components found for: VITDT1  Lab Results   Component Value Date    JAMAR 15 10/05/2022     Lab Results   Component Value Date    PTHI 54 07/26/2022     Lab Results  "  Component Value Date    ZN 78.4 03/29/2022     Lab Results   Component Value Date    VIB1WB 101 03/29/2022     Lab Results   Component Value Date    TSH 4.57 03/29/2022     No results found for: TEST    DIETARY HISTORY  Tracking technique: regular fitness  Positive Changes Since Last Visit: moving more this last week  Struggling With: tolerance of iron support    Getting Adequate Protein: usually  Sleep schedule: n/a.      PHYSICAL ACTIVITY PATTERNS:  Cardiovascular: stepper daily for 25 minutes  Strength Training: same    REVIEW OF SYSTEMS  Tolerates Wegovy well. .  PHYSICAL EXAM:  Vitals: Ht 1.549 m (5' 1\")   Wt 108 kg (238 lb)   BMI 44.97 kg/m    Weight:   Wt Readings from Last 3 Encounters:   05/17/23 108 kg (238 lb)   03/07/23 111.6 kg (246 lb)   02/15/23 111.6 kg (246 lb)         GEN: Pleasant, well groomed, in no acute distress  HEENT:  Slightly pale complexion on video, no jaundice. Normal facies .  NECK: No swelling.  HEART: .  LUNGS: No respiratory difficulty noted. No cough. .  ABDOMEN: .  EXTREMITIES: No tremor. Seated at desk..  NEURO: Alert and Oriented X3, fluent speech. .  SKIN: No visible rashes. To face/hands..    Interim study results: none..      30 minutes spent by me on the date of the encounter doing chart review, history and exam, documentation and further activities per the note   Manoj Stover MD  [facility]  10:41 AM  5/17/2023      Video-Visit Details    Type of service:  Video Visit    Platform used for Video Visit: [email or web address]    Video End Time (time video stopped): 11:10 AM    Originating Location (pt. Location): Home        Distant Location (provider location):  On-site    Distant Location (provider location):  [facility] SURGERY [facility] AND BARIATRICS CARE [facility]"

## 2023-05-17 NOTE — PATIENT INSTRUCTIONS
Plan:   1.  Diet: aiming for protein first approach at meals to help both bariatric method and avoiding protein malnourishment while on Wegovy.  Continue to hydrate well between meals to feel your best, hitting 64-80oz/day goal.  2. Exercise: good work with stair stepper  3. Medication: continue ramping process and once up to 2.4mg/week dose we'll look to maintain that dose until your insurance allows, ideally at least 6-9 months at that top dose.   4. Continue good vitamin support and iron supplementation. SSS tonic or Vitamin Code healthy blood may be helpful for some iron support to supplement your 18mg/serving multivitamins  5. Goals: maintaining now a 73 lb reduction in weight from pre surgery and down about 32 lbs from last year when increased focus on weight related health. 12-13% reduction in weight over this last year, a meaningful amount. Goal this year is to help stabilize BMI under 35.

## 2023-06-13 ENCOUNTER — OFFICE VISIT (OUTPATIENT)
Dept: ORTHOPEDICS | Facility: CLINIC | Age: 43
End: 2023-06-13
Payer: OTHER MISCELLANEOUS

## 2023-06-13 ENCOUNTER — ANCILLARY PROCEDURE (OUTPATIENT)
Dept: GENERAL RADIOLOGY | Facility: CLINIC | Age: 43
End: 2023-06-13
Attending: PEDIATRICS
Payer: OTHER MISCELLANEOUS

## 2023-06-13 VITALS
HEIGHT: 61 IN | DIASTOLIC BLOOD PRESSURE: 84 MMHG | SYSTOLIC BLOOD PRESSURE: 118 MMHG | WEIGHT: 238 LBS | BODY MASS INDEX: 44.93 KG/M2

## 2023-06-13 DIAGNOSIS — R20.0 NUMBNESS AND TINGLING IN RIGHT HAND: ICD-10-CM

## 2023-06-13 DIAGNOSIS — R20.2 NUMBNESS AND TINGLING IN RIGHT HAND: ICD-10-CM

## 2023-06-13 DIAGNOSIS — M25.531 ACUTE PAIN OF RIGHT WRIST: Primary | ICD-10-CM

## 2023-06-13 DIAGNOSIS — M25.531 ACUTE PAIN OF RIGHT WRIST: ICD-10-CM

## 2023-06-13 PROCEDURE — 73110 X-RAY EXAM OF WRIST: CPT | Mod: TC | Performed by: RADIOLOGY

## 2023-06-13 PROCEDURE — 99214 OFFICE O/P EST MOD 30 MIN: CPT | Performed by: PEDIATRICS

## 2023-06-13 NOTE — Clinical Note
6/13/2023         RE: Leah John  [address] [place] [address]        Dear Colleague,    Thank you for referring your patient, Leah John, to the [facility] SPORTS MEDICINE CLINIC [facility]. Please see a copy of my visit note below.    ASSESSMENT & PLAN    Leah was seen today for numbness.    Diagnoses and all orders for this visit:    Acute pain of right wrist  -     XR Wrist Right G/E 3 Views; Future  -     EMG; Future    Numbness and tingling in right hand  -     EMG; Future      Undiagnosed new problem, unclear prognosis currently.  Potential CTS. Also question some component ulnar neuropathy.  We discussed the following: symptom treatment, activity modification/rest, imaging, rehab, medication, support for the affected area and electrodiagnostic testing. Following discussion, plan:  Edx testing next.  Questions answered. Discussed signs and symptoms that may indicate more serious issues; the patient was instructed to seek appropriate care if noted. Leah indicates understanding of these issues and agrees with the plan.        See Patient Instructions  Patient Instructions   Right upper extremity symptoms suspicious for nerve source.  We discussed potential component for carpal tunnel syndrome (median neuropathy at the wrist), possibly ulnar nerve involvement as well.  There is also right wrist stiffness, with history of ganglion cyst removal.  For next steps, we discussed considerations around use of support for the right upper extremity, potential referral to hand therapy, potential use of medications for symptoms, as well as additional evaluation with nerve study.  Following discussion, we discussed routine nighttime wrist bracing, wrist in neutral position.  Okay to use the brace that you have.  We also discussed use of a towel roll to keep the elbow in an extended position overnight for sleep.  Additionally, plan right upper extremity EMG, evaluate for source of  symptoms.  If this is not conclusive, we may consider additional imaging of the wrist such as with MRI, and there is still potential for referral to hand therapy as well.    The  representative will assist you in the coordination of your test as prescribed by your physician.  If you have not heard from the  representative in 1-2 days, you can reach the scheduling team at [phone].    EMG Right upper extremity    The clinic will contact you with results. If you have not heard from the clinic within 2-3 days following your EMG, please contact us at the number listed below.      If you have any further questions for your physician or physician s care team you can contact them thru MyChart or by calling  [phone] and use option 3 to leave a voice message.   Messages received during business hours will be returned same day.          Jose Clark [facility] SPORTS MEDICINE CLINIC [facility]    -----  Chief Complaint   Patient presents with     Right Hand - Numbness       SUBJECTIVE  Leah John is a/an 42 year old female who is seen as a self referral for evaluation of Right hand numbness.     The patient is seen by themselves.  The patient is Right handed    Onset: 2-3 month(s) ago, pain escalating in the last 3 weeks. Reports insidious onset without acute precipitating event.  Location of Pain: right wrist, hypothenar and into the ulnar side of the wrist radiating to radial.  Worsened by: Gripping (reports weakend  strength)  Better with: Bracing (sometimes, causes stiffness)  Treatments tried: Tylenol and bracing  Associated symptoms: numbness and tingling in all fingers at night    Orthopedic/Surgical history: Cyst removed in right wrist in 2008  Social History/Occupation:       Above information per rooming staff.  Additional history:    Generalized ache in right wrist currently at rest.  Also has had some pain in left hypothenar eminence.  Nighttime N/T,  "can awake at night with that. Not really during day. With that, notes more ulnar side of hand.  Symptoms most proximal into ulnar distal forearm.    Has noted some persistent stiffness in right wrist after surgery noted above.  No noted joint noise.      REVIEW OF SYSTEMS:  Review of Systems    OBJECTIVE:  /84   Ht 1.549 m (5' 1\")   Wt 108 kg (238 lb)   BMI 44.97 kg/m     General: healthy, alert and in no distress  Skin: no suspicious lesions or rash.  CV: distal perfusion intact   Resp: normal respiratory effort without conversational dyspnea   Psych: normal mood and affect  Gait: NORMAL  Neuro: Normal tone      Hand/wrist (right):    Inspection:  No deformity noted.  No swelling. No ecchymosis.  Well healed dorsal wrist surgical scar.    Motion:  Forearm pronation , forearm supination full.  Wrist flexion mild limitation, stiffness  Wrist extension mild limitation, stiffness  Digit motion grossly intact    Radial pulses normal, +2/4, capillary refill brisk.    Tinel pos right. Phalen with wrist pain, decreased strength per pt after, no change in N/T. Reverse Phalen stiffness in wrist, no change in pain.      **  Tinel no change at cubital tunnel.        RADIOLOGY:  Final results and radiologist's interpretation, available in the [facility] health record.  Images were reviewed with the patient in the office today.  My personal interpretation of the performed imaging: no acute bony abnormality noted.      XR Wrist Right G/E 3 Views    Narrative    XR WRIST RIGHT G/E 3 VIEWS  6/13/2023 9:51 AM     HISTORY: Acute pain of right wrist  COMPARISON: None      Impression    IMPRESSION: No acute fracture or malalignment. There is normal joint  spacing.     KIM CORONADO MD         SYSTEM ID:  [id number]                      Again, thank you for allowing me to participate in the care of your patient.        Sincerely,        Jose Clark, DO  "

## 2023-06-13 NOTE — PROGRESS NOTES
ASSESSMENT & PLAN    Leah was seen today for numbness.    Diagnoses and all orders for this visit:    Acute pain of right wrist  -     XR Wrist Right G/E 3 Views; Future  -     EMG; Future    Numbness and tingling in right hand  -     EMG; Future      Undiagnosed new problem, unclear prognosis currently.  Potential CTS. Also question some component ulnar neuropathy.  We discussed the following: symptom treatment, activity modification/rest, imaging, rehab, medication, support for the affected area and electrodiagnostic testing. Following discussion, plan:  Edx testing next.  Questions answered. Discussed signs and symptoms that may indicate more serious issues; the patient was instructed to seek appropriate care if noted. Leah indicates understanding of these issues and agrees with the plan.        See Patient Instructions  Patient Instructions   Right upper extremity symptoms suspicious for nerve source.  We discussed potential component for carpal tunnel syndrome (median neuropathy at the wrist), possibly ulnar nerve involvement as well.  There is also right wrist stiffness, with history of ganglion cyst removal.  For next steps, we discussed considerations around use of support for the right upper extremity, potential referral to hand therapy, potential use of medications for symptoms, as well as additional evaluation with nerve study.  Following discussion, we discussed routine nighttime wrist bracing, wrist in neutral position.  Okay to use the brace that you have.  We also discussed use of a towel roll to keep the elbow in an extended position overnight for sleep.  Additionally, plan right upper extremity EMG, evaluate for source of symptoms.  If this is not conclusive, we may consider additional imaging of the wrist such as with MRI, and there is still potential for referral to hand therapy as well.    The  representative will assist you in the coordination of your test as prescribed by your  physician.  If you have not heard from the  representative in 1-2 days, you can reach the scheduling team at [phone].    EMG Right upper extremity    The clinic will contact you with results. If you have not heard from the clinic within 2-3 days following your EMG, please contact us at the number listed below.      If you have any further questions for your physician or physician s care team you can contact them thru MyChart or by calling  [phone] and use option 3 to leave a voice message.   Messages received during business hours will be returned same day.          Jose Clark DO  [facility]    -----  Chief Complaint   Patient presents with     Right Hand - Numbness       SUBJECTIVE  Leah John is a/an 42 year old female who is seen as a self referral for evaluation of Right hand numbness.     The patient is seen by themselves.  The patient is Right handed    Onset: 2-3 month(s) ago, pain escalating in the last 3 weeks. Reports insidious onset without acute precipitating event.  Location of Pain: right wrist, hypothenar and into the ulnar side of the wrist radiating to radial.  Worsened by: Gripping (reports weakend  strength)  Better with: Bracing (sometimes, causes stiffness)  Treatments tried: Tylenol and bracing  Associated symptoms: numbness and tingling in all fingers at night    Orthopedic/Surgical history: Cyst removed in right wrist in 2008  Social History/Occupation:       Above information per rooming staff.  Additional history:    Generalized ache in right wrist currently at rest.  Also has had some pain in left hypothenar eminence.  Nighttime N/T, can awake at night with that. Not really during day. With that, notes more ulnar side of hand.  Symptoms most proximal into ulnar distal forearm.    Has noted some persistent stiffness in right wrist after surgery noted above.  No noted joint noise.      REVIEW OF  "SYSTEMS:  Review of Systems    OBJECTIVE:  /84   Ht 1.549 m (5' 1\")   Wt 108 kg (238 lb)   BMI 44.97 kg/m     General: healthy, alert and in no distress  Skin: no suspicious lesions or rash.  CV: distal perfusion intact   Resp: normal respiratory effort without conversational dyspnea   Psych: normal mood and affect  Gait: NORMAL  Neuro: Normal tone      Hand/wrist (right):    Inspection:  No deformity noted.  No swelling. No ecchymosis.  Well healed dorsal wrist surgical scar.    Motion:  Forearm pronation , forearm supination full.  Wrist flexion mild limitation, stiffness  Wrist extension mild limitation, stiffness  Digit motion grossly intact    Radial pulses normal, +2/4, capillary refill brisk.    Tinel pos right. Phalen with wrist pain, decreased strength per pt after, no change in N/T. Reverse Phalen stiffness in wrist, no change in pain.      **  Tinel no change at cubital tunnel.        RADIOLOGY:  Final results and radiologist's interpretation, available in the [facility] health record.  Images were reviewed with the patient in the office today.  My personal interpretation of the performed imaging: no acute bony abnormality noted.      XR Wrist Right G/E 3 Views    Narrative    XR WRIST RIGHT G/E 3 VIEWS  6/13/2023 9:51 AM     HISTORY: Acute pain of right wrist  COMPARISON: None      Impression    IMPRESSION: No acute fracture or malalignment. There is normal joint  spacing.     KIM CORONADO MD         SYSTEM ID:  [id number]                  "

## 2023-06-13 NOTE — PATIENT INSTRUCTIONS
Right upper extremity symptoms suspicious for nerve source.  We discussed potential component for carpal tunnel syndrome (median neuropathy at the wrist), possibly ulnar nerve involvement as well.  There is also right wrist stiffness, with history of ganglion cyst removal.  For next steps, we discussed considerations around use of support for the right upper extremity, potential referral to hand therapy, potential use of medications for symptoms, as well as additional evaluation with nerve study.  Following discussion, we discussed routine nighttime wrist bracing, wrist in neutral position.  Okay to use the brace that you have.  We also discussed use of a towel roll to keep the elbow in an extended position overnight for sleep.  Additionally, plan right upper extremity EMG, evaluate for source of symptoms.  If this is not conclusive, we may consider additional imaging of the wrist such as with MRI, and there is still potential for referral to hand therapy as well.    The  representative will assist you in the coordination of your test as prescribed by your physician.  If you have not heard from the  representative in 1-2 days, you can reach the scheduling team at [phone].    EMG Right upper extremity    The clinic will contact you with results. If you have not heard from the clinic within 2-3 days following your EMG, please contact us at the number listed below.      If you have any further questions for your physician or physician s care team you can contact them thru [employer] or by calling  [phone] and use option 3 to leave a voice message.   Messages received during business hours will be returned same day.

## 2023-07-10 ENCOUNTER — MYC REFILL (OUTPATIENT)
Dept: FAMILY MEDICINE | Facility: CLINIC | Age: 43
End: 2023-07-10
Payer: COMMERCIAL

## 2023-07-10 DIAGNOSIS — F41.9 ANXIETY: ICD-10-CM

## 2023-07-10 NOTE — TELEPHONE ENCOUNTER
Patient to follow up with PCP.    Note from VV with MP:    Return to clinic with any worsening or changes in symptoms and follow up with PCP for routine care.   - FLUoxetine (PROZAC) 20 MG capsule; Take 1 capsule (20 mg) by mouth daily    Lynda Giron RN

## 2023-08-02 ENCOUNTER — VIRTUAL VISIT (OUTPATIENT)
Dept: FAMILY MEDICINE | Facility: CLINIC | Age: 43
End: 2023-08-02
Payer: COMMERCIAL

## 2023-08-02 DIAGNOSIS — F33.42 RECURRENT MAJOR DEPRESSION IN COMPLETE REMISSION (H): ICD-10-CM

## 2023-08-02 DIAGNOSIS — F41.9 ANXIETY: Primary | ICD-10-CM

## 2023-08-02 DIAGNOSIS — E03.9 HYPOTHYROIDISM, UNSPECIFIED TYPE: ICD-10-CM

## 2023-08-02 PROBLEM — D50.9 IRON DEFICIENCY ANEMIA: Status: RESOLVED | Noted: 2018-01-20 | Resolved: 2023-08-02

## 2023-08-02 PROCEDURE — 99441 PR PHYSICIAN TELEPHONE EVALUATION 5-10 MIN: CPT | Performed by: FAMILY MEDICINE

## 2023-08-02 ASSESSMENT — PATIENT HEALTH QUESTIONNAIRE - PHQ9
10. IF YOU CHECKED OFF ANY PROBLEMS, HOW DIFFICULT HAVE THESE PROBLEMS MADE IT FOR YOU TO DO YOUR WORK, TAKE CARE OF THINGS AT HOME, OR GET ALONG WITH OTHER PEOPLE: NOT DIFFICULT AT ALL
SUM OF ALL RESPONSES TO PHQ QUESTIONS 1-9: 2
SUM OF ALL RESPONSES TO PHQ QUESTIONS 1-9: 2

## 2023-08-02 ASSESSMENT — ANXIETY QUESTIONNAIRES
2. NOT BEING ABLE TO STOP OR CONTROL WORRYING: NOT AT ALL
6. BECOMING EASILY ANNOYED OR IRRITABLE: SEVERAL DAYS
4. TROUBLE RELAXING: SEVERAL DAYS
GAD7 TOTAL SCORE: 3
7. FEELING AFRAID AS IF SOMETHING AWFUL MIGHT HAPPEN: NOT AT ALL
GAD7 TOTAL SCORE: 3
5. BEING SO RESTLESS THAT IT IS HARD TO SIT STILL: NOT AT ALL
IF YOU CHECKED OFF ANY PROBLEMS ON THIS QUESTIONNAIRE, HOW DIFFICULT HAVE THESE PROBLEMS MADE IT FOR YOU TO DO YOUR WORK, TAKE CARE OF THINGS AT HOME, OR GET ALONG WITH OTHER PEOPLE: NOT DIFFICULT AT ALL
1. FEELING NERVOUS, ANXIOUS, OR ON EDGE: SEVERAL DAYS
3. WORRYING TOO MUCH ABOUT DIFFERENT THINGS: NOT AT ALL

## 2023-08-02 ASSESSMENT — ENCOUNTER SYMPTOMS: NERVOUS/ANXIOUS: 1

## 2023-08-02 NOTE — PROGRESS NOTES
"Leah is a 42 year old who is being evaluated via a billable video visit.      How would you like to obtain your AVS? MyChart  If the video visit is dropped, the invitation should be resent by: Text to cell phone: [phone]  Will anyone else be joining your video visit? No          Assessment & Plan     Anxiety  Recurrent major depression in complete remission (H)    Controlled on fluoxetine at current dose.  Continue.  Refills of fluoxetine provided.  - FLUoxetine (PROZAC) 20 MG capsule; Take 1 capsule (20 mg) by mouth daily    Hypothyroidism, unspecified type  Niccoli euthyroid.  We will check TSH with her next lab draw.  - TSH with free T4 reflex; Future             BMI:   Estimated body mass index is 44.97 kg/m  as calculated from the following:    Height as of 6/13/23: 1.549 m (5' 1\").    Weight as of 6/13/23: 108 kg (238 lb).           Keiry Barrientos MD  [facility]    Taya Lynn is a 42 year old, presenting for the following health issues:  No chief complaint on file.      Seen today, intended to be video visit but due to failure of audio, we transition to phone visit instead.  Started on fluoxetine 12/19/2022 due to worsening anxiety, depression, irritability, outbursts.  Has been feeling well on this medication and feeling like herself, feels in good control.  Noting no side effects.  Interested in continuing.  Notes Mirena IUD in place, has been present now for 5 years, tolerating well.  Intermittent spotting.  We discussed that this can be in place for up to 8 years.  Follow[facility] Dr. Stover [facility]  Has appointment later this month, will have labs drawn then as well.  History of hypothyroidism, has not required replacement.  Nutritional labs usually followed by Dr. Rosario.      History of Present Illness       Mental Health Follow-up:  Patient presents to follow-up on Depression.Patient's depression since last visit has " been:  Good  The patient is not having other symptoms associated with depression.      Any significant life events: No  Patient is not feeling anxious or having panic attacks.  Patient has no concerns about alcohol or drug use.    She eats 2-3 servings of fruits and vegetables daily.She consumes 1 sweetened beverage(s) daily.She exercises with enough effort to increase her heart rate 10 to 19 minutes per day.  She exercises with enough effort to increase her heart rate 3 or less days per week.   She is taking medications regularly.               Review of Systems   Psychiatric/Behavioral:  The patient is nervous/anxious.             Objective           Vitals:  No vitals were obtained today due to virtual visit.    Physical Exam   Alert, very pleasant.  Able to speak in full sentences without cough, respiratory distress, wheeze.  Appropriate affect and responses.                Video-Visit Details    Type of service:  Video Visit transitioned to phone visit due to audio failure    Originating Location (pt. Location): Home    Distant Location (provider location):  On-site  Platform used for Video Visit: Unable to complete video visit    8 minutes phone visit duration

## 2023-08-09 ENCOUNTER — MYC MEDICAL ADVICE (OUTPATIENT)
Dept: SURGERY | Facility: CLINIC | Age: 43
End: 2023-08-09
Payer: COMMERCIAL

## 2023-08-09 DIAGNOSIS — K90.9 INTESTINAL MALABSORPTION, UNSPECIFIED TYPE: ICD-10-CM

## 2023-08-09 DIAGNOSIS — Z98.84 S/P BARIATRIC SURGERY: ICD-10-CM

## 2023-08-09 DIAGNOSIS — K91.2 POSTOPERATIVE MALABSORPTION: Primary | ICD-10-CM

## 2023-08-09 NOTE — TELEPHONE ENCOUNTER
8 yrs post op lab orders placed for patient in preparation for appointment with  in August.    Adrienne Atwood RN, CBN  [facility]  P [phone]  F [phone]

## 2023-08-10 ENCOUNTER — LAB (OUTPATIENT)
Dept: LAB | Facility: CLINIC | Age: 43
End: 2023-08-10
Payer: COMMERCIAL

## 2023-08-10 DIAGNOSIS — K90.9 INTESTINAL MALABSORPTION, UNSPECIFIED TYPE: ICD-10-CM

## 2023-08-10 DIAGNOSIS — E03.9 HYPOTHYROIDISM, UNSPECIFIED TYPE: ICD-10-CM

## 2023-08-10 DIAGNOSIS — Z98.84 S/P BARIATRIC SURGERY: ICD-10-CM

## 2023-08-10 DIAGNOSIS — K91.2 POSTOPERATIVE MALABSORPTION: ICD-10-CM

## 2023-08-10 LAB
ALBUMIN SERPL BCG-MCNC: 4.1 G/DL (ref 3.5–5.2)
ALP SERPL-CCNC: 75 U/L (ref 35–104)
ALT SERPL W P-5'-P-CCNC: 20 U/L (ref 0–50)
ANION GAP SERPL CALCULATED.3IONS-SCNC: 13 MMOL/L (ref 7–15)
AST SERPL W P-5'-P-CCNC: 31 U/L (ref 0–45)
BILIRUB SERPL-MCNC: 0.3 MG/DL
BUN SERPL-MCNC: 10.9 MG/DL (ref 6–20)
CALCIUM SERPL-MCNC: 8.8 MG/DL (ref 8.6–10)
CHLORIDE SERPL-SCNC: 104 MMOL/L (ref 98–107)
CREAT SERPL-MCNC: 0.77 MG/DL (ref 0.51–0.95)
DEPRECATED HCO3 PLAS-SCNC: 21 MMOL/L (ref 22–29)
ERYTHROCYTE [DISTWIDTH] IN BLOOD BY AUTOMATED COUNT: 14.4 % (ref 10–15)
FERRITIN SERPL-MCNC: 9 NG/ML (ref 6–175)
FOLATE SERPL-MCNC: 14.8 NG/ML (ref 4.6–34.8)
GFR SERPL CREATININE-BSD FRML MDRD: >90 ML/MIN/1.73M2
GLUCOSE SERPL-MCNC: 90 MG/DL (ref 70–99)
HBA1C MFR BLD: 5.3 % (ref 0–5.6)
HCT VFR BLD AUTO: 35.7 % (ref 35–47)
HGB BLD-MCNC: 11.3 G/DL (ref 11.7–15.7)
MCH RBC QN AUTO: 25.3 PG (ref 26.5–33)
MCHC RBC AUTO-ENTMCNC: 31.7 G/DL (ref 31.5–36.5)
MCV RBC AUTO: 80 FL (ref 78–100)
PLATELET # BLD AUTO: 316 10E3/UL (ref 150–450)
POTASSIUM SERPL-SCNC: 3.9 MMOL/L (ref 3.4–5.3)
PROT SERPL-MCNC: 6.8 G/DL (ref 6.4–8.3)
PTH-INTACT SERPL-MCNC: 47 PG/ML (ref 15–65)
RBC # BLD AUTO: 4.47 10E6/UL (ref 3.8–5.2)
SODIUM SERPL-SCNC: 138 MMOL/L (ref 136–145)
TSH SERPL DL<=0.005 MIU/L-ACNC: 3.84 UIU/ML (ref 0.3–4.2)
VIT B12 SERPL-MCNC: 333 PG/ML (ref 232–1245)
WBC # BLD AUTO: 5.7 10E3/UL (ref 4–11)

## 2023-08-10 PROCEDURE — 84425 ASSAY OF VITAMIN B-1: CPT | Mod: 90

## 2023-08-10 PROCEDURE — 82306 VITAMIN D 25 HYDROXY: CPT

## 2023-08-10 PROCEDURE — 82607 VITAMIN B-12: CPT

## 2023-08-10 PROCEDURE — 36415 COLL VENOUS BLD VENIPUNCTURE: CPT

## 2023-08-10 PROCEDURE — 84443 ASSAY THYROID STIM HORMONE: CPT

## 2023-08-10 PROCEDURE — 82746 ASSAY OF FOLIC ACID SERUM: CPT

## 2023-08-10 PROCEDURE — 80053 COMPREHEN METABOLIC PANEL: CPT

## 2023-08-10 PROCEDURE — 85027 COMPLETE CBC AUTOMATED: CPT

## 2023-08-10 PROCEDURE — 83036 HEMOGLOBIN GLYCOSYLATED A1C: CPT

## 2023-08-10 PROCEDURE — 84630 ASSAY OF ZINC: CPT | Mod: 90

## 2023-08-10 PROCEDURE — 83970 ASSAY OF PARATHORMONE: CPT

## 2023-08-10 PROCEDURE — 82728 ASSAY OF FERRITIN: CPT

## 2023-08-10 PROCEDURE — 99000 SPECIMEN HANDLING OFFICE-LAB: CPT

## 2023-08-10 PROCEDURE — 84590 ASSAY OF VITAMIN A: CPT | Mod: 90

## 2023-08-11 LAB — DEPRECATED CALCIDIOL+CALCIFEROL SERPL-MC: 32 UG/L (ref 20–75)

## 2023-08-12 LAB — ZINC SERPL-MCNC: 75.6 UG/DL

## 2023-08-13 LAB
ANNOTATION COMMENT IMP: NORMAL
RETINYL PALMITATE SERPL-MCNC: <0.02 MG/L
VIT A SERPL-MCNC: 0.54 MG/L
VIT B1 PYROPHOSHATE BLD-SCNC: 125 NMOL/L

## 2023-08-16 ENCOUNTER — VIRTUAL VISIT (OUTPATIENT)
Dept: SURGERY | Facility: CLINIC | Age: 43
End: 2023-08-16
Payer: COMMERCIAL

## 2023-08-16 DIAGNOSIS — E66.01 CLASS 3 SEVERE OBESITY DUE TO EXCESS CALORIES WITH BODY MASS INDEX (BMI) OF 40.0 TO 44.9 IN ADULT, UNSPECIFIED WHETHER SERIOUS COMORBIDITY PRESENT (H): ICD-10-CM

## 2023-08-16 DIAGNOSIS — Z98.84 S/P BARIATRIC SURGERY: ICD-10-CM

## 2023-08-16 DIAGNOSIS — E66.813 CLASS 3 SEVERE OBESITY DUE TO EXCESS CALORIES WITH BODY MASS INDEX (BMI) OF 40.0 TO 44.9 IN ADULT, UNSPECIFIED WHETHER SERIOUS COMORBIDITY PRESENT (H): ICD-10-CM

## 2023-08-16 DIAGNOSIS — K91.2 POSTOPERATIVE MALABSORPTION: Primary | ICD-10-CM

## 2023-08-16 PROCEDURE — 99214 OFFICE O/P EST MOD 30 MIN: CPT | Mod: VID | Performed by: EMERGENCY MEDICINE

## 2023-08-16 NOTE — PROGRESS NOTES
Bariatric Follow Up Visit with a History of Previous Bariatric Surgery     Date of visit: 8/16/2023  Physician: Manoj Stover MD, MD  Primary Care Provider:  Keiry Barrientos  Leah Duranjane   42 year old  female    Date of Surgery: 7/15/15  Initial Weight: 311 lbs  Initial BMI: 59.7  Today's Weight:   Wt Readings from Last 1 Encounters:   06/13/23 108 kg (238 lb)   Reports weight today of 233 lbs.   Weight history:   Wt Readings from Last 4 Encounters:   06/13/23 108 kg (238 lb)   05/17/23 108 kg (238 lb)   03/07/23 111.6 kg (246 lb)   02/15/23 111.6 kg (246 lb)      There is no height or weight on file to calculate BMI.      Assessment and Plan     Assessment: Leah is a 42 year old year old female who is 8 years s/p  Pavithra en Y Gastric Bypass with Dr. Olivier.  This past year, she's been feeling well overall. Her weight is down 78 lbs from her preoperative weight, a durable 24% total body weight reduction over the last 8 years and down .  After her last pregnancy, she'd started up Phentermine therapy in 2022 and then transition to Wegovy in February of 2023 (at 246 lbs at the time). She's found her Wegovy to be well tolerated at the 2.4mg/week dose and her weight is down 13 lbs over the last 6 months. (5.2%)  Last refills should bring her into November '23 and I would like to extend her course 3-6 months given good tolerance of Wegovy with residual Class III obesity (BMI 44 today).     Her 8/10/23 annual labs showed good supplementation overall with just some low end of the normal range B12 suggestive of missed doses.  1000mcg sublingual B12, 2-3 days weekly should maintain normal range over her lifetime. Hemoglobin levels are nearly normalizing and are at their best since 2018.     Leah John feels as if she hasn't fully achieved the goals she hoped to accomplish through bariatric surgery and weight loss but feels the best she has in a long time with the progress made in the last few years.    No  diagnosis found.      Current Outpatient Medications:     calcium citrate (CITRACAL) 950 (200 Ca) MG tablet, Take 1 tablet by mouth 2 times daily, Disp: , Rfl:     ferrous fumarate 65 mg, [place]. FE,-Vitamin C 125 mg (VITRON-C)  MG TABS tablet, Use twice daily, 20minutes after meals for 30 days then reduce to once daily dosing thereafter for 2 months until gone., Disp: 120 tablet, Rfl: 0    FLUoxetine (PROZAC) 20 MG capsule, Take 1 capsule (20 mg) by mouth daily, Disp: 90 capsule, Rfl: 4    Multiple Vitamin (MULTIVITAMIN ADULT PO), , Disp: , Rfl:     Semaglutide-Weight Management (WEGOVY) 2.4 MG/0.75ML SOAJ, Inject 2.4 mg Subcutaneous every 7 days for 270 days 4 pens, Disp: 3 mL, Rfl: 9    vitamin B-12 (CYANOCOBALAMIN) 500 MCG tablet, , Disp: , Rfl:     Vitamin D (Cholecalciferol) 25 MCG (1000 UT) TABS, , Disp: , Rfl:     Plan:  Nice work with good continued weight reduction.  Aim to get your 70 grams of lean protein daily to nourish metabolic drive/lean muscle mass.   Continue meals every 5 hours to feel best.  1000mcg of sublingual B12 2-3 days weekly should prevent deficiency. Once weekly at the 5000mcg dose works well for most if less frequent dosing is desired.  Aim for 4959-1271 international unit(s) of D3 daily. Continue once daily calcium citrate.  Supplement iron as needed to reach 45mg/day on average, anemia is improving nicely.  Recheck weight loss progress in 2 months as planned.  No follow-ups on file.    Bariatric Surgery Review     Interim History/LifeChanges: life stable, going well. Down to 233 lbs.now and feels well.   No menstrual issues.   No issues w/ Wegovy.     Patient Concerns: none  Appetite (1-10): feels well controlled lately. No drinkable calories.   GERD: no.    Reviewed whether any need/indication for screening EGD today and we will deferred.  Typically, a screening EGD is recommend post op year 2-3 if no symptoms to assess health of esophagus/bariatric surgery and sooner if  "difficult to control GERD or persistent pain/dysphagia sx despite behavior modification.    Medication changes: none.    Vitamin Intake:   B-12   Sublingual.    MVI  Equate equivalent   Vitamin D  Yes, unsure.    Calcium   Citrate once daily.     Other  None.              LABS: \"Reviewed    Nausea no  Vomiting no  Constipation coffee helps if iron causing constipation.   Diarrhea no  Rashes will use Maryann deodorant and antifungal.   Hair Loss none.  Reactive Hypoglycemia none. No dumping since starting Wegovy. Energy is better, knees better.  Light Headedness no   Moods good, feels good.      Most recent labs:  Lab Results   Component Value Date    WBC 5.7 08/10/2023    HGB 11.3 (L) 08/10/2023    HCT 35.7 08/10/2023    MCV 80 08/10/2023     08/10/2023     Lab Results   Component Value Date    CHOL 180 10/31/2022     Lab Results   Component Value Date    HDL 54 10/31/2022     No components found for: LDLCALC  Lab Results   Component Value Date    TRIG 78 10/31/2022     No results found for: CHOLHDL  Lab Results   Component Value Date    ALT 20 08/10/2023    AST 31 08/10/2023    ALKPHOS 75 08/10/2023     No results found for: HGBA1C  Lab Results   Component Value Date    B12 333 08/10/2023     No components found for: VITDT1  Lab Results   Component Value Date    JAMAR 9 08/10/2023     Lab Results   Component Value Date    PTHI 47 08/10/2023     Lab Results   Component Value Date    ZN 75.6 08/10/2023     Lab Results   Component Value Date    VIB1WB 125 08/10/2023     Lab Results   Component Value Date    TSH 3.84 08/10/2023     No results found for: TEST    Habits:  Tobacco/Nicotine/THC exposure? no   NSAID use? no   Alcohol use? no   Caffeine Habits? Coffee if needed       Exercise Routine: swimming this summer. Has bike in basement.   3 meals/day? yes  Protein 60-80g/day? yes  Water Separate from meals? yes  Calorie Containing Beverages: no  Restaurant eating/wk: n/a  Sleep Habits:  sleeping   CPAP Use? " "never  Contraception: n/a  DEXA:will start screening at age 45, mom has had infusions for bone density in the past..  Discussed annual screening to start at age 45 and continue to age 55 if scoring \"low risk\". DEXA scan recommended at age 55 regardless as long as at least 2 years have transpired from their bariatric surgery.    Social History     Social History     Socioeconomic History    Marital status:      Spouse name: Not on file    Number of children: Not on file    Years of education: Not on file    Highest education level: Not on file   Occupational History    Not on file   Tobacco Use    Smoking status: Never    Smokeless tobacco: Never   Vaping Use    Vaping Use: Never used   Substance and Sexual Activity    Alcohol use: Yes     Alcohol/week: 2.5 standard drinks of alcohol     Types: 3 Standard drinks or equivalent per week    Drug use: No    Sexual activity: Yes     Partners: Male     Birth control/protection: I.U.D., Other   Other Topics Concern    Parent/sibling w/ CABG, MI or angioplasty before 65F 55M? No   Social History Narrative    Not on file     Social Determinants of Health     Financial Resource Strain: Not on file   Food Insecurity: Not on file   Transportation Needs: Not on file   Physical Activity: Not on file   Stress: Not on file   Social Connections: Not on file   Intimate Partner Violence: Not on file   Housing Stability: Not on file       Past Medical History     Past Medical History:   Diagnosis Date    Anisometropia     Anxiety     Cervical intraepithelial neoplasia III 2013    ALFRED III (cervical intraepithelial neoplasia III) 7/20/2012 2005, 2006 NIL paps 10/17/07 ASCUS, neg HPV 2008, 2009, 2011 NIL paps 6/25/12 ASC-H 7/20/12 Colpo bx ALFRED I, II, III, ECC neg 9/13/12 LEEP ALFRED I & III, margins neg, ECC neg 4/8/13 NIL pap, neg HPV 10/3/13 NIL pap, neg HPV 1/15/15 NIL pap, neg HPV Above per Care Everywhere 12/29/16 NIL pap, neg HPV 8/17/21 NIL pap, +HR HPV (not 16/18). Plan: " colposcopy due before 21 [place]: atypical squa    Depression     20s; on medications zoloft; prozac x 1 year    Depressive disorder     Dyslipidemia     Hyperlipidemia with target LDL less than 160 2011    Formatting of this note might be different from the original. ICD 10    Hyperparathyroidism (H) 2019    Hypothyroidism     Iron deficiency anemia 2018    Knee pain 3/14/2011    Low vitamin B12 level 2019    Metabolic syndrome     Morbid obesity (H)     Morbid obesity with BMI of 50.0-59.9, adult (H) 3/25/2015    MTHFR mutation     heterozygous    PCOS (polycystic ovarian syndrome)     Plantar fasciitis 6/10/2022    Polycystic ovary syndrome     Pre-diabetes     Prothrombin gene mutation (H) 2017    heterozygous    S/P bariatric surgery 7/15/2015    Strabismus     Stress fracture of right foot, initial encounter 6/10/2022    Vitamin D deficiency 2019     Past Surgical History:   Procedure Laterality Date    ABDOMEN SURGERY  2015    Gastric bypass    BIOPSY CERVICAL, LOCAL EXCISION, SINGLE/MULTIPLE      ALFRED III     SECTION N/A 2018    Procedure: PRIMARY  SECTION;  Surgeon: Becky Rg MD;  Location: [facility] L+D OR;  Service:     COLONOSCOPY      EXCISE GANGLION WRIST Right     EYE SURGERY      x3; as a child    LEEP TX, CERVICAL      IL LAP GASTRIC BYPASS/CRISTIANE-EN-Y N/A 07/15/2015    Mark Olivier MD; [facility] Initial Weight 311 lbs, BMI 59.7    SOFT TISSUE SURGERY      Ganglion cyst removal       Problem List     Patient Active Problem List   Diagnosis    Knee pain    Morbid obesity (H)    Anisometropia    Anxiety    Hyperlipidemia with target LDL less than 160    Hyperparathyroidism (H)    Hypothyroidism    Low vitamin B12 level    Metabolic syndrome    MTHFR mutation    PCOS (polycystic ovarian syndrome)    Pre-diabetes    S/P bariatric surgery    Vitamin D deficiency    Morbid obesity with BMI of 50.0-59.9,  adult (H)    ALFRED III (cervical intraepithelial neoplasia III)    Stress fracture of right foot, initial encounter    Plantar fasciitis     Medications     [unfilled]  Surgical History     Past Surgical History  She has a past surgical history that includes leep tx, cervical (); Excise ganglion wrist (Right); Eye surgery; Pr Lap Gastric Bypass/Pavithra-En-Y (N/A, 07/15/2015); Biopsy cervical, local excision, single/multiple ();  Section (N/A, 2018); Abdomen surgery (2015); colonoscopy (); and Soft tissue surgery ().    Objective-Exam     Constitutional:  LMP  (LMP Unknown)   [unfilled]   General:  Pleasant and in no acute distress   Eyes:  EOMI  ENT:  Airway patent,    Neck:  Respiratory: Normal respiratory effort, no cough, .  CV:    Gastrointestinal:   Musculoskeletal: muscle mass WNL, ambulates normally on video  Skin: color without obvious pallor, fair; hair long.,   Psychiatric: alert and oriented X3, mood and affect normal    Counseling     We reviewed the important post op bariatric recommendations:  -eating 3 meals daily  -eating protein first, getting >60gm protein daily  -eating slowly, chewing food well  -avoiding/limiting calorie containing beverages  -drinking water 15-30 minutes before or after meals  -limiting restaurant or cafeteria eating to twice a week or less    We discussed the importance of restorative sleep and stress management in maintaining a healthy weight.  We discussed the National Weight Control Registry healthy weight maintenance strategies and ways to optimize metabolism.  We discussed the importance of physical activity including cardiovascular and strength training in maintaining a healthier weight.    We discussed the importance of life-long vitamin supplementation and life-long  follow-up.    Leah was reminded that, to avoid marginal ulcers she should avoid tobacco at all, alcohol in excess, caffeine in excess, and NSAIDS (unless indicated  for cardioprotection or othewise and opposed by a PPI).    Manoj Stover MD    [facility].  8/15/2023  7:08 PM  [phone] (clinic phone)  [phone] (fax)    No images are attached to the encounter.  Medical Decision Makin minutes spent by me on the date of the encounter doing chart review, review of test results, interpretation of tests, patient visit, and documentation

## 2023-08-16 NOTE — PATIENT INSTRUCTIONS
Plan:  Nice work with good continued weight reduction.  Aim to get your 70 grams of lean protein daily to nourish metabolic drive/lean muscle mass.   Continue meals every 5 hours to feel best.  1000mcg of sublingual B12 2-3 days weekly should prevent deficiency. Once weekly at the 5000mcg dose works well for most if less frequent dosing is desired.  Aim for 0400-3589 international unit(s) of D3 daily. Continue once daily calcium citrate.  Supplement iron as needed to reach 45mg/day on average, anemia is improving nicely.  Recheck weight loss progress in 2 months as planned.    [facility]  Nutritional Guidelines  Gastric Bypass 18 Months Post Op and Beyond    General Guidelines and Helpful Hints:  Eat 3 meals per day + protein supplement(s). No snacks between meals.  Do not skip meals.  This can cause overeating at the next meal and will prevent adequate protein and nutritional intake.  Aim for 60-80 grams of protein per day.  Always eat your protein first. This assists with optimal nutrition and helps you stay full longer.  Depending on your portion size, you may need to drink approved protein supplement between meals to achieve protein goals. Follow recommendations of your Dietitian.   Eat your protein first, and then follow with fiber.   It is not necessary to count your fiber, but 15-20 grams per day is recommended.    Add fiber by including fruits, vegetables, whole grains, and beans.   Portions should remain about 1 cup per meal. Use measuring cups to be accurate.  Continue to use saucer/salad plates, infant/toddler silverware to keep portion sizes small and take small bites.  Eat S-L-O-W-L-Y to make each meal last 20-30 minutes. Always stop eating when satisfied.  Continue to use caution with foods containing skins, peels or membranes. Chew well!  Aim for 64 oz. of calorie-free fluids daily.  Continue to avoid caffeine and carbonation. If you choose to drink alcohol, do so in moderation.  "  Remember to avoid drinking during meals, 15-30 minutes before and 30 minutes after.  Exercise is benavidez for continued weight loss and weight maintenance. 150 minutes weekly of moderate aerobic activity or 75 minutes of vigorous with 2 days or more a week of strength training. Try to get 20% or more of your steps each day at a brisk pace, as though hurrying to a bus stop. Look to get stronger this year.  If having trouble tolerating meat, try using a crock-pot, tinfoil tent, steamer or other moist cooking method to create tender meats. Add broth or low-fat gravy to help meat stay moist.   Avoid high sugar and high fat foods to prevent dumping syndrome.  Check nutrition labels for less than 10 grams of sugar and less than 10 grams of fat per serving.  Continue Taking Vitamins/Minerals:  1000 mcg of Sublingual B-12 at least 3 days weekly to average 350-500mcg/day. If using 2500mcg lozenges, 2 weekly.  If 5000 mcg, once weekly dosing works.  1 Complete Multivitamin with 18mg Iron twice daily (chewable or swallow tabs). Often sold as \"women's one a day\" if tablet but take twice daily.  500-600 mg Calcium Citrate twice daily (chewable or swallow tabs).  5000 IU Vitamin D3 daily.  If menstruating, you may need closer to 60mg of iron daily to prevent iron deficiency. An occasional \"boost\" of extra iron supplement during/after is reasonable if heavy flow.    Sample Grocery List    Protein:  Fat free Greek or light yogurt (less than 10 grams sugar)  Fat free or low-fat cottage cheese  String cheese or reduced fat cheese slices  Tuna, salmon, crab, egg, or chicken salad made with light or fat free mayonnaise  Egg or Egg Substitute  Lean/extra lean turkey, beef, bison, venison (ground, sirloin, round, flank)  Pork loin or tenderloin (grilled, baked, broiled)  Fish such as salmon, tuna, trout, tilapia, etc. (grilled, baked, broiled)  Tender cuts of lean (skinless) turkey or chicken  Lean deli meats: turkey, lean ham, chicken, " lean roast beef  Beans such as kidney, garbanzo, black, mendez, or low-fat/fat free refried beans  Peanut butter (natural preferred). Limit to 1 Tbsp. per day.  Low-fat meatloaf (made with lean ground beef or turkey)  Sloppy Joes made with low-sugar ketchup and lean ground beef or turkey  Soy or vegetable protein (i.e. vegan crumbles, soy/veggie burger, tofu)  Hummus    Vegetables:  Fresh: cooked or raw (as tolerated)  Frozen vegetables  Canned vegetables (low sodium or no salt added, rinse before cooking/eating)  (Ok to have skins/peels/membranes/seeds - just chew well)    Fruits:  Fresh fruit  Frozen fruit (no sugar added)  Canned fruit (packed in its own juice, NOT syrup)  (Ok to have skins/peels/membranes/seeds - just chew well)    Starch:  Unsweetened whole-grain hot cereal (or high fiber cold cereal, dry)  Toasted whole wheat bread or [place] Thins  Whole grain crackers  Baked /boiled/mashed potato/sweet potato  Cooked whole grain pasta, brown rice, or other cooked whole grains  Starchy vegetables: corn, peas, winter squash    Protein Supplement:   Ready to drink protein shake with:  15-30 grams protein per serving  Less than 10 grams total carbohydrate per serving   Protein powder mixed with:   Skim or 1% milk  Low fat or fat free Lactaid milk, plain or no sugar added soymilk  Water     Fats: (use in moderation)  1 teaspoon of soft tub margarine  1 teaspoon olive oil, canola oil, or peanut oil  1 tablespoon of low-fat mccormick or salad dressing     Sample Menu for 18+ months after Gastric Bypass    You do NOT need to eat/drink the full portion sizes listed below  Always stop when you are satisfied    Breakfast   cup 1% cottage cheese     cup mixed berries   Lunch 2 oz lean roast beef on   [place] Thin with 1 tsp. light mccormick    small tomato, chopped, mixed with 1 tsp. light vinaigrette dressing   Supplement Approved protein supplement (if needed between meals)   Dinner 2 oz grilled salmon    cup salad greens with  1 tsp. light salad dressing and 1 tsp. ground flax seed    cup quinoa or brown rice     Breakfast   cup egg substitute with   cup sautéed chopped vegetables  2 light [place] Krisp crackers   Lunch Tuna Melt:   cup tuna mixed with 1 tsp. light mccormick over   [place] Thin. Top with 2-3 slices cucumber and 1 oz slice of low fat cheese   Supplement 1 cup skim milk (if needed between meals)   Dinner 3 oz  grilled, broiled, or baked seasoned skinless chicken breast    cup asparagus     Breakfast   cup plain oatmeal made with skim or 1% milk with 1 Tbsp. flavored/unflavored protein powder added  1 mozzarella string cheese   Lunch 2 oz deli turkey breast  1/3 cup salad with 1 tsp. light salad dressing, 1/8 of a whole avocado and 1 Tbsp. sunflower seeds   Dinner 3 oz. pork loin made in a crock pot, seasoned with a spice rub    cup cooked carrots   Supplement Approved protein supplement (if needed between meals)     Breakfast 1 cup breakfast casserole made with egg substitute, turkey sausage,  and steamed, chopped bell peppers   Supplement  1 cup light Greek yogurt (if needed between meals)   Lunch 2 oz. teriyaki turkey    cup mashed sweet potato with 1-2 spritzes of spray butter    cup fresh pineapple   Dinner 3 oz low fat meatloaf    cup roasted garlic zucchini     Breakfast   cup leftover breakfast casserole    cup no sugar added applesauce with 1 Tbsp. unflavored protein powder and a sprinkle of cinnamon    Lunch 3 oz shrimp with 1-2 Tbsp. low-sugar cocktail sauce for dipping    c. whole wheat pasta drizzled with   tsp. olive oil   Supplement 1 cup skim/1% milk with scoop of protein powder (if needed between meals)   Dinner Grilled, seasoned kebob with 2 oz lean beef and   cup vegetables     Breakfast Breakfast pizza:   [place] Thin spread with 1 Tbsp. low sugar spaghetti sauce,   cup shredded low fat cheese, melted and 1 slice of [place] varma     cup fresh fruit mixed with chopped almonds   Lunch   cup black bean soup  4-5  whole grain crackers   Dinner 3 oz  tilapia with lemon pepper seasoning    cup stewed tomatoes   Supplement 1 string cheese (if needed between meals)     Breakfast 2 hard boiled eggs (discard 1 egg yolk)    whole wheat English Muffin with 1 tsp. low sugar jelly   Lunch   cup leftover black bean soup topped with 1-2 Tbsp. low fat cheese  2-3 light [place] Krisp crackers   Supplement Approved protein supplement (if needed between meals)   Dinner 3 oz sirloin steak    cup steamed broccoli      LEAN PROTEIN SOURCES  Getting 20-30 grams of protein, 3 meals daily, is appropriate for most people, some need more but more than about 40 grams per meal is not useful.  General rule is drinking one ounce of water per gram of protein eaten over the course of the day:  70 grams of protein each day, drink 70 oz of water.  Protein Source Portion Calories Grams of Protein                           Nonfat, plain Greek yogurt    (10 grams sugar or less) 3/4 cup (6 oz)  12-17   Light Yogurt (10 grams sugar or less) 3/4 cup (6 oz)  6-8   Protein Shake 1 shake 110-180 15-30   Skim/1% Milk or lactose-free milk 1 cup ( 8 oz)  8   Plain or light, flavored soymilk 1 cup  7-8   Plain or light, hemp milk 1 cup 110 6   Fat Free or 1% Cottage Cheese 1/2 cup 90 15   Part skim ricotta cheese 1/2 cup 100 14   Part skim or reduced fat cheese slices 1 ounce 65-80 8     Mozzarella String Cheese 1 80 8   Canned tuna, chicken, crab or salmon  (canned in water)  1/2 cup 100 15-20   White fish (broiled, grilled, baked) 3 ounces 100 21   [place]/Tuna (broiled, grilled, baked) 3 ounces 150-180 21   Shrimp, Scallops, Lobster, Crab 3 ounces 100 21   Pork loin, Pork Tenderloin 3 ounces 150 21   Boneless, skinless chicken /turkey breast                          (broiled, grilled, baked) 3 ounces 120 21   [place], [place], [place], and Venison 3 ounces 120 21   Lean cuts of red meat and pork (sirloin,   round, tenderloin, flank, ground 93%-96%) 3  ounces 170 21   Lean or Extra Lean Ground Turkey 1/2 cup 150 20   90-95% Lean [place] Burger 1 david 140-180 21   Low-fat casserole with lean meat 3/4 cup 200 17   Luncheon Meats                                                        (turkey, lean ham, roast beef, chicken) 3 ounces 100 21   Egg (boiled, poached, scrambled) 1 Egg 60 7   Egg Substitute 1/2 cup 70 10   Nuts (limit to 1 serving per day)  3 Tbsp. 150 7   Nut [place] (peanut, almond)  Limit to 1 serving or less daily 1 Tbsp. 90 4   Soy Burger (varies) 1  15   Garbanzo, Black, [place] Beans 1/2 cup 110 7   Refried Beans 1/2 cup 100 7   Kidney and Lima beans 1/2 cup 110 7   Tempeh 3 oz 175 18   Vegan crumbles 1/2 cup 100 14   Tofu 1/2 cup 110 14   Chili (beans and extra lean beef or turkey) 1 cup 200 23   Lentil Stew/Soup 1 cup 150 12   Black Bean Soup 1 cup 175 12       On-the-Go Breakfast Ideas  As of 2015, the latest research shows what a huge impact eating breakfast has on losing weight and feeling your best. People lose more weight when they make breakfast their biggest meal of the day compared to Dinner, but even if you cannot go to that degree, getting a breakfast that has at least 20 grams of protein and even a moderate amount of fat is ideal for maintaining good energy through the day and limits overeating in the evening hours.  The following are some quick and easy suggestions for at least getting something of substance into your body in the morning.  Enjoy!    Eating breakfast within 90 minutes of waking up is an important part of taking care of your body on a restricted calorie diet plan.  After sleeping for hours, your body is in need of fuel.  An ideal breakfast is a combination of protein, whole-grain carbohydrates, or fruit.  Here s why:    -Protein digests very slowly in the body, helping you feel more satisfied.  -Whole grains provide dietary fiber, which also digests slowly and helps keep your gut clean.  -Fruit is a great source of  vitamins, minerals, and fiber.     Each one of these breakfast combinations has between 200-300 calories and 15-20 grams of protein.  Feel free to mix and match!    Bone Broth (chicken bone broth or beef bone broth) is a great way to boost protein content. 8oz of bone broth will typically have 9-12grams of protein for 40kcal of energy.    Protein: Choose  -1/2 cup low-fat cottage cheese  -2 hard boiled eggs , or one cooked in olive oil (low/slow heat).  -1 low fat string cheese stick  -1 Tablespoon natural peanut butter  -Achieve X vegetarian sausage david (found in freezer section)  -1 slice lowfat cheese  -6 oz 2% or lowfat Greek yogurt, such as Fage or Oikos.    PLUS    Whole Grains:  Choose   -1 whole wheat English muffin  -1 whole wheat dinorah, half  -1/2 Fiber One frozen muffin, thawed  -1/2 Fiber One toaster pastry  -1 whole wheat bagel thin  -1/2 cup Kashi cereal  -1 Kashi waffle (or other whole grain high-fiber waffle)  Aim for whole grain/sprouted breads with at least 3g of fiber/slice if having bread. [place] is one such brand.    OR    Fruit: Choose  -1/3 cup blueberries  -1/2 banana (or a plantain- similar to a banana, yet smaller)  -1/2 cup cantaloupe cubes  -1 small apple  -1 small orange  -1/2 cup strawberries  -handful raspberries/blackberries (each berry is about 1 calorie).    *Adapted from Diabetes [employer], Fall 20    Ten Breakfasts Under 250 calories    Ideally, getting between 350-600 calories  (depending on starting height and weight)for breakfast is ideal for avoiding hunger later in the day, adjust/add to the following accordingly:    One- 250 calories, 8.5 g protein  1 slice whole-grain toast   1 Tbsp peanut butter    banana    Two- 250 calories, 8 g protein    cup nonfat/lowfat yogurt  1/3rd cup diced no-sugar peaches  1/3rd cup cereal (like Special K, Cheerios, or bran flakes)    Three- 250 calories, 25 g protein  1 egg scrambled with 1 oz skim milk    cup shredded cheddar     whole grain English muffin  1 oz [place] varma  1 tsp margarine spread    Four- 225 calories, 25 g protein  1/2 cup Kashi Go-Lean cereal    cup skim milk mixed with 1 scoop Bariatric Advantage protein powder    cup no-sugar diced pears    Five- 250 calories, 20 g protein    cup oatmeal prepared with skim milk, 1 scoop protein powder, and sugar-free maple syrup    Six- 200 calories, 5 g protein  1 whole grain waffle, toasted  1 tablespoon creamy peanut or almond butter    Seven-  250 calories, 19 g protein  Breakfast sandwich: 1 slice whole grain toast, cut in half.  Add 1 scrambled egg and one slice cheddar  cheese.    Eight-  250 calories, 15 g protein  2 eggs scrambled with 1/3 cup frozen spinach (heat before adding to eggs) and 2 tablespoons low fat cream cheese.    Nine-  150 calories, 15 g protein  2/3rd cup cottage cheese    cup cantaloupe    Ten- 200 calories, 20 g protein  Fruit smoothie made with 4 oz. nonfat Greek yogurt,   cup berries, 1 scoop protein powder, and 4 oz skim milk.    Ten Lunches Under 250 Calories    Aim for lunch to be around 300-400 calories a day when trying to lose weight and get that protein in!    One- 200 calories, 11 g protein  1/3 cup tuna salad made with light mccormick on 1 slice whole grain bread  1 small peeled apple    Two- 250 calories, 16 g protein  1/3 cup lowfat cottage cheese    cup cooked green beans    small fruit cocktail (in natural juice)    Three- 200 calories, 11 g protein    grilled cheese sandwich on whole grain bread with lowfat cheese  2/3rd cup of tomato soup    Four- 250 calories, 22 g protein  Deli wrap: 1 oz sliced turkey, 1 oz sliced ham, 1 oz sliced chicken rolled up with 1 slice low-fat cheese  1 small orange    Five- 250 calories, 28 g protein  2/3rd cup chili with 1 oz shredded cheese  4 saltine crackers    Six- 250 calories, 22 g protein  1 cup fresh spinach with 2 oz chicken, 1/3rd cup mandarin oranges, and 2 tablespoons sliced almonds with 1 tablespoon   vinaigrette dressing    Seven- 200 calories, 11 g protein  1 Tbsp sugar-free preserves and 1 Tbsp peanut butter on 1 slice whole grain toast    cup nonfat/lowfat Greek yogurt    Eight- 250 calories, 18 g protein  1 small soft-shell chicken taco with 1 oz shredded cheese, lettuce, tomato, salsa, and 1 Tbsp light sour cream    cup black beans    Nine- 225 calories, 13 g protein  2 ounces baked chicken  1/4 cup mashed potatoes    cup green beans    Ten- 200 calories, 21 g protein  Deli dinorah: 2 oz roast beef or other deli meat with 1 tsp Javi mayonnaise and sliced tomato, onion, and lettuce  1/3rd cup cottage cheese      Ten Dinners Under 300 calories    If you're eating a large breakfast and medium lunch, keep dinner small.  300-400 calories is ideal for most people depending on their caloric needs.    One- 300 calories, 12 g protein  1-inch thick slice of turkey meatloaf    cup baked butternut squash    Two- 200 calories, 9 g protein  Bread-less BLT: 3 slices turkey varma, sliced tomato, wrapped in a large lettuce leaf    cup peeled fruit    Three- 275 calories, 36 g protein  3 oz roasted chicken    cup cooked broccoli    cup shredded cheddar cheese    cup unsweetened applesauce    Four- 200 calories, 25 g protein  3 oz baked tilapia  1/3rd cup cooked carrots    cup yogurt    Five- 250 calories, 20 g protein  Grilled ham  n  Swiss: spread 2 tsp ghee or butter on 1 slice of whole grain bread.  Cut bread in half, layer 2 oz deli ham with 1 piece of Swiss cheese and grill until cheese is melted.    cup cooked vegetables    Six- 250 calories, 18 g protein  Vegetarian cheeseburger: 1 [place] cheeseburger topped with lettuce, onion, tomato, and ketchup/mustard    cup sweet potato fries    Seven- 250 calories, 18 g protein  Pork pot roast: 2 oz roasted pork loin, 1/3rd cup roasted carrots,   medium potato, cooked with   cup gravy    Eight- 330 calories, 25 g protein  2 oz meatballs (about 2 small meatballs)    cup spaghetti  sauce  1/2 piece toast topped with 1 tsp ghee or butterand topped with garlic powder, toasted in oven    Nine- 250 calories, 16 g protein  Mexican pizza: one 8  corn tortilla topped with 2 oz chicken,   cup salsa, 2 tablespoons black beans, 2 tablespoons shredded cheese.  Bake until cheese is melted.    Ten- 250 calories, 22 g protein  Shrimp stir-reid: 3 oz cooked shrimp, 1/6th onion,   pepper,   cup chopped carrots sautéed in 1 tablespoon olive oil, topped with 2 tablespoons stir reid sauce and a pinch of sesame seeds        150 Calories or Less Snack Ideas   1 hardboiled egg with   cup berries  1 small apple with 1 hardboiled egg  10 almonds with   cup berries  2 clementines with 1 light string cheese  1 light string cheese with   sliced apple  1 light string cheese wrapped in 2 slices of turkey  4 100% whole wheat crackers (e.g. Triscuit) with 1 light string cheese    c. cottage cheese with   cup fruit and 1 Tbsp sunflower seeds     cup cottage cheese with   of an avocado     can tuna fish with 1 cup sliced cucumbers     cup roasted garbanzo beans with paprika and cayenne pepper    baked sweet potato with   cup chili beans or   cup cottage cheese  2 oz. nitrate free turkey slices with 1 cup carrots  1 container (6 oz) of low sugar (less than 10 grams of sugar) greek yogurt   3 Tablespoons of hummus with 1 cup sliced bell peppers   2 Tablespoons of hummus with 15 baby carrots  4 Tablespoons ranch dip made with plain Greek Yogurt and 3 mini cucumbers  1/4 cup nuts (any kind)  1 Tablespoon peanut butter with 1 stalk celery   1 dill pickle wrapped in 1-2 slices of deli ham with 1 tsp of mayonnaise/mustard.

## 2023-08-16 NOTE — PROGRESS NOTES
Leah John is 43 year old  female who presents for a billable video visit today.    How would you like to obtain your AVS? MyChart  If dropped from the video visit, the video invitation should be resent by: Send to e-mail at: [email or web address]  Will anyone else be joining your video visit? No      Video Start Time:  8:58am    Are there any specific questions or needs that you would like addressed at your visit today?   No co concerns. Follow up scheduled    Provider Notes:       Video-Visit Details    Type of service:  Video Visit    Platform used for Video Visit: [employer]    Video End Time (time video stopped): 9:24 AM    Originating Location (pt. Location): Home      Distant Location (provider location):  On-site    Distant Location (provider location):  [facility] [place]

## 2023-08-16 NOTE — LETTER
8/16/2023         RE: Leah John  [address] [place] [address]        Dear Colleague,    Thank you for referring your patient, Leah John, to the [facility] [place]. Please see a copy of my visit note below.    Bariatric Follow Up Visit with a History of Previous Bariatric Surgery     Date of visit: 8/16/2023  Physician: Manoj Stover MD, MD  Primary Care Provider:  Keiry Barrientos  Leah BRENT Duranjane   42 year old  female    Date of Surgery: 7/15/15  Initial Weight: 311 lbs  Initial BMI: 59.7  Today's Weight:   Wt Readings from Last 1 Encounters:   06/13/23 108 kg (238 lb)   Reports weight today of 233 lbs.   Weight history:   Wt Readings from Last 4 Encounters:   06/13/23 108 kg (238 lb)   05/17/23 108 kg (238 lb)   03/07/23 111.6 kg (246 lb)   02/15/23 111.6 kg (246 lb)      There is no height or weight on file to calculate BMI.      Assessment and Plan     Assessment: Leah is a 42 year old year old female who is 8 years s/p  Pavithra en Y Gastric Bypass with Dr. Olivier.  This past year, she's been feeling well overall. Her weight is down 78 lbs from her preoperative weight, a durable 24% total body weight reduction over the last 8 years and down .  After her last pregnancy, she'd started up Phentermine therapy in 2022 and then transition to Wegovy in February of 2023 (at 246 lbs at the time). She's found her Wegovy to be well tolerated at the 2.4mg/week dose and her weight is down 13 lbs over the last 6 months. (5.2%)  Last refills should bring her into November '23 and I would like to extend her course 3-6 months given good tolerance of Wegovy with residual Class III obesity (BMI 44 today).     Her 8/10/23 annual labs showed good supplementation overall with just some low end of the normal range B12 suggestive of missed doses.  1000mcg sublingual B12, 2-3 days weekly should maintain normal range over her lifetime. Hemoglobin levels are  nearly normalizing and are at their best since 2018.     Leah John feels as if she hasn't fully achieved the goals she hoped to accomplish through bariatric surgery and weight loss but feels the best she has in a long time with the progress made in the last few years.    No diagnosis found.      Current Outpatient Medications:      calcium citrate (CITRACAL) 950 (200 Ca) MG tablet, Take 1 tablet by mouth 2 times daily, Disp: , Rfl:      ferrous fumarate 65 mg, [place]. FE,-Vitamin C 125 mg (VITRON-C)  MG TABS tablet, Use twice daily, 20minutes after meals for 30 days then reduce to once daily dosing thereafter for 2 months until gone., Disp: 120 tablet, Rfl: 0     FLUoxetine (PROZAC) 20 MG capsule, Take 1 capsule (20 mg) by mouth daily, Disp: 90 capsule, Rfl: 4     Multiple Vitamin (MULTIVITAMIN ADULT PO), , Disp: , Rfl:      Semaglutide-Weight Management (WEGOVY) 2.4 MG/0.75ML SOAJ, Inject 2.4 mg Subcutaneous every 7 days for 270 days 4 pens, Disp: 3 mL, Rfl: 9     vitamin B-12 (CYANOCOBALAMIN) 500 MCG tablet, , Disp: , Rfl:      Vitamin D (Cholecalciferol) 25 MCG (1000 UT) TABS, , Disp: , Rfl:     Plan:  Nice work with good continued weight reduction.  Aim to get your 70 grams of lean protein daily to nourish metabolic drive/lean muscle mass.   Continue meals every 5 hours to feel best.  1000mcg of sublingual B12 2-3 days weekly should prevent deficiency. Once weekly at the 5000mcg dose works well for most if less frequent dosing is desired.  Aim for 4559-5722 international unit(s) of D3 daily. Continue once daily calcium citrate.  Supplement iron as needed to reach 45mg/day on average, anemia is improving nicely.  Recheck weight loss progress in 2 months as planned.  No follow-ups on file.    Bariatric Surgery Review     Interim History/LifeChanges: life stable, going well. Down to 233 lbs.now and feels well.   No menstrual issues.   No issues w/ Wegovy.     Patient Concerns: none  Appetite (1-10):  "feels well controlled lately. No drinkable calories.   GERD: no.    Reviewed whether any need/indication for screening EGD today and we will deferred.  Typically, a screening EGD is recommend post op year 2-3 if no symptoms to assess health of esophagus/bariatric surgery and sooner if difficult to control GERD or persistent pain/dysphagia sx despite behavior modification.    Medication changes: none.    Vitamin Intake:   B-12   Sublingual.    MVI  Equate equivalent   Vitamin D  Yes, unsure.    Calcium   Citrate once daily.     Other  None.              LABS: \"Reviewed    Nausea no  Vomiting no  Constipation coffee helps if iron causing constipation.   Diarrhea no  Rashes will use Maryann deodorant and antifungal.   Hair Loss none.  Reactive Hypoglycemia none. No dumping since starting Wegovy. Energy is better, knees better.  Light Headedness no   Moods good, feels good.      Most recent labs:  Lab Results   Component Value Date    WBC 5.7 08/10/2023    HGB 11.3 (L) 08/10/2023    HCT 35.7 08/10/2023    MCV 80 08/10/2023     08/10/2023     Lab Results   Component Value Date    CHOL 180 10/31/2022     Lab Results   Component Value Date    HDL 54 10/31/2022     No components found for: LDLCALC  Lab Results   Component Value Date    TRIG 78 10/31/2022     No results found for: CHOLHDL  Lab Results   Component Value Date    ALT 20 08/10/2023    AST 31 08/10/2023    ALKPHOS 75 08/10/2023     No results found for: HGBA1C  Lab Results   Component Value Date    B12 333 08/10/2023     No components found for: VITDT1  Lab Results   Component Value Date    JAMAR 9 08/10/2023     Lab Results   Component Value Date    PTHI 47 08/10/2023     Lab Results   Component Value Date    ZN 75.6 08/10/2023     Lab Results   Component Value Date    VIB1WB 125 08/10/2023     Lab Results   Component Value Date    TSH 3.84 08/10/2023     No results found for: TEST    Habits:  Tobacco/Nicotine/THC exposure? no   NSAID use? no   Alcohol use? no " "  Caffeine Habits? Coffee if needed       Exercise Routine: swimming this summer. Has bike in basement.   3 meals/day? yes  Protein 60-80g/day? yes  Water Separate from meals? yes  Calorie Containing Beverages: no  Restaurant eating/wk: n/a  Sleep Habits:  sleeping   CPAP Use? never  Contraception: n/a  DEXA:will start screening at age 45, mom has had infusions for bone density in the past..  Discussed annual screening to start at age 45 and continue to age 55 if scoring \"low risk\". DEXA scan recommended at age 55 regardless as long as at least 2 years have transpired from their bariatric surgery.    Social History     Social History     Socioeconomic History     Marital status:      Spouse name: Not on file     Number of children: Not on file     Years of education: Not on file     Highest education level: Not on file   Occupational History     Not on file   Tobacco Use     Smoking status: Never     Smokeless tobacco: Never   Vaping Use     Vaping Use: Never used   Substance and Sexual Activity     Alcohol use: Yes     Alcohol/week: 2.5 standard drinks of alcohol     Types: 3 Standard drinks or equivalent per week     Drug use: No     Sexual activity: Yes     Partners: Male     Birth control/protection: I.U.D., Other   Other Topics Concern     Parent/sibling w/ CABG, MI or angioplasty before 65F 55M? No   Social History Narrative     Not on file     Social Determinants of Health     Financial Resource Strain: Not on file   Food Insecurity: Not on file   Transportation Needs: Not on file   Physical Activity: Not on file   Stress: Not on file   Social Connections: Not on file   Intimate Partner Violence: Not on file   Housing Stability: Not on file       Past Medical History     Past Medical History:   Diagnosis Date     Anisometropia      Anxiety      Cervical intraepithelial neoplasia III 2013     ALFRED III (cervical intraepithelial neoplasia III) 7/20/2012    2005, 2006 NIL paps 10/17/07 ASCUS, neg HPV 2008, " 2011 NIL paps 12 ASC-H 12 Colpo bx ALFRED I, II, III, ECC neg 12 LEEP ALFRED I & III, margins neg, ECC neg 13 NIL pap, neg HPV 10/3/13 NIL pap, neg HPV 1/15/15 NIL pap, neg HPV Above per Care Everywhere 16 NIL pap, neg HPV 21 NIL pap, +HR HPV (not 16/18). Plan: colposcopy due before 21 [place]: atypical squa     Depression     20s; on medications zoloft; prozac x 1 year     Depressive disorder      Dyslipidemia      Hyperlipidemia with target LDL less than 160 2011    Formatting of this note might be different from the original. ICD 10     Hyperparathyroidism (H) 2019     Hypothyroidism      Iron deficiency anemia 2018     Knee pain 3/14/2011     Low vitamin B12 level 2019     Metabolic syndrome      Morbid obesity (H)      Morbid obesity with BMI of 50.0-59.9, adult (H) 3/25/2015     MTHFR mutation     heterozygous     PCOS (polycystic ovarian syndrome)      Plantar fasciitis 6/10/2022     Polycystic ovary syndrome      Pre-diabetes      Prothrombin gene mutation (H) 2017    heterozygous     S/P bariatric surgery 7/15/2015     Strabismus      Stress fracture of right foot, initial encounter 6/10/2022     Vitamin D deficiency 2019     Past Surgical History:   Procedure Laterality Date     ABDOMEN SURGERY  2015    Gastric bypass     BIOPSY CERVICAL, LOCAL EXCISION, SINGLE/MULTIPLE      ALFRED III      SECTION N/A 2018    Procedure: PRIMARY  SECTION;  Surgeon: Becky Rg MD;  [facility]: [facility] [place];  Service:      COLONOSCOPY       EXCISE GANGLION WRIST Right      EYE SURGERY      x3; as a child     LEEP TX, CERVICAL       IL LAP GASTRIC BYPASS/CRISTIANE-EN-Y N/A 07/15/2015    Mark Olivier MD; [facility]. Initial Weight 311 lbs, BMI 59.7     SOFT TISSUE SURGERY      Ganglion cyst removal       Problem List     Patient Active Problem List   Diagnosis     Knee pain     Morbid obesity (H)      Anisometropia     Anxiety     Hyperlipidemia with target LDL less than 160     Hyperparathyroidism (H)     Hypothyroidism     Low vitamin B12 level     Metabolic syndrome     MTHFR mutation     PCOS (polycystic ovarian syndrome)     Pre-diabetes     S/P bariatric surgery     Vitamin D deficiency     Morbid obesity with BMI of 50.0-59.9, adult (H)     ALFRED III (cervical intraepithelial neoplasia III)     Stress fracture of right foot, initial encounter     Plantar fasciitis     Medications     [unfilled]  Surgical History     Past Surgical History  She has a past surgical history that includes leep tx, cervical (); Excise ganglion wrist (Right); Eye surgery; Pr Lap Gastric Bypass/Pavithra-En-Y (N/A, 07/15/2015); Biopsy cervical, local excision, single/multiple ();  Section (N/A, 2018); Abdomen surgery (2015); colonoscopy (); and Soft tissue surgery ().    Objective-Exam     Constitutional:  LMP  (LMP Unknown)   [unfilled]   General:  Pleasant and in no acute distress   Eyes:  EOMI  ENT:  Airway patent,    Neck:  Respiratory: Normal respiratory effort, no cough, .  CV:    Gastrointestinal:   Musculoskeletal: muscle mass WNL, ambulates normally on video  Skin: color without obvious pallor, fair; hair long.,   Psychiatric: alert and oriented X3, mood and affect normal    Counseling     We reviewed the important post op bariatric recommendations:  -eating 3 meals daily  -eating protein first, getting >60gm protein daily  -eating slowly, chewing food well  -avoiding/limiting calorie containing beverages  -drinking water 15-30 minutes before or after meals  -limiting restaurant or cafeteria eating to twice a week or less    We discussed the importance of restorative sleep and stress management in maintaining a healthy weight.  We discussed the National Weight Control Registry healthy weight maintenance strategies and ways to optimize metabolism.  We discussed the importance of physical  activity including cardiovascular and strength training in maintaining a healthier weight.    We discussed the importance of life-long vitamin supplementation and life-long  follow-up.    Leah was reminded that, to avoid marginal ulcers she should avoid tobacco at all, alcohol in excess, caffeine in excess, and NSAIDS (unless indicated for cardioprotection or othewise and opposed by a PPI).    Manoj Stvoer MD    [facility].  8/15/2023  7:08 PM  [phone] (clinic phone)  [phone] (fax)    No images are attached to the encounter.  Medical Decision Makin minutes spent by me on the date of the encounter doing chart review, review of test results, interpretation of tests, patient visit, and documentation     Leah John is 43 year old  female who presents for a billable video visit today.    How would you like to obtain your AVS? MyChart  If dropped from the video visit, the video invitation should be resent by: Send to e-mail at: [email or web address]  Will anyone else be joining your video visit? No      Video Start Time:  8:58am    Are there any specific questions or needs that you would like addressed at your visit today?   No co concerns. Follow up scheduled    Provider Notes:       Video-Visit Details    Type of service:  Video Visit    Platform used for Video Visit: Povio    Video End Time (time video stopped): 9:24 AM    Originating Location (pt. Location): Home      Distant Location (provider location):  On-site    Distant Location (provider location):  [facility] BARIATR[facility]        Again, thank you for allowing me to participate in the care of your patient.        Sincerely,        Maonj Stover MD

## 2023-09-08 ENCOUNTER — TELEPHONE (OUTPATIENT)
Dept: SURGERY | Facility: CLINIC | Age: 43
End: 2023-09-08
Payer: COMMERCIAL

## 2023-09-08 NOTE — TELEPHONE ENCOUNTER
Prior Authorization Retail Medication Request    Medication/Dose: Wegovy 2.4mg/0.75ml Auto-injectors  Previously Tried and Failed:  Pt has been taking this medication with good results.    Weight when stating Wegovy in February 2023=246 lb  Weight as of 8/16/2023=233 lb    Key: [id number]    Pharmacy Information (if different than what is on RX)  Name:  [facility]  Phone:  [phone]

## 2023-09-12 NOTE — TELEPHONE ENCOUNTER
Central Prior Authorization Team   Phone: [phone]    [place] Initiation    Medication: Wegovy 2.4mg/0.75ml Auto-injectors  Insurance Company: [employer] - Phone [phone] Fax [phone]  Pharmacy Filling the Rx: [id number] - [place], [place] - [address]  Filling Pharmacy Phone: [phone]  Filling Pharmacy Fax:    Start Date: 9/12/2023    Manually faxed request.

## 2023-09-15 ENCOUNTER — TRANSFERRED RECORDS (OUTPATIENT)
Dept: HEALTH INFORMATION MANAGEMENT | Facility: CLINIC | Age: 43
End: 2023-09-15
Payer: COMMERCIAL

## 2023-09-15 NOTE — TELEPHONE ENCOUNTER
Prior Authorization Approval    Authorization Effective Date: 9/14/2023  Authorization Expiration Date: 9/14/2024  Medication: Wegovy 2.4mg/0.75ml Auto-injectors  Approved Dose/Quantity:    Reference #:     Insurance Company: [employer] - Phone [phone] Fax [phone]  Expected CoPay:       CoPay Card Available:      Foundation Assistance Needed:    Which Pharmacy is filling the prescription (Not needed for infusion/clinic administered): [id number] - [place], [place] - [address] [place] [address]  Pharmacy Notified: Yes  Patient Notified:  Pharmacy will notify patient

## 2023-10-10 ENCOUNTER — IMMUNIZATION (OUTPATIENT)
Dept: NURSING | Facility: CLINIC | Age: 43
End: 2023-10-10
Payer: COMMERCIAL

## 2023-10-10 PROCEDURE — 90480 ADMN SARSCOV2 VAC 1/ONLY CMP: CPT

## 2023-10-10 PROCEDURE — 90686 IIV4 VACC NO PRSV 0.5 ML IM: CPT

## 2023-10-10 PROCEDURE — 91320 SARSCV2 VAC 30MCG TRS-SUC IM: CPT

## 2023-10-10 PROCEDURE — 90471 IMMUNIZATION ADMIN: CPT

## 2023-10-25 ENCOUNTER — VIRTUAL VISIT (OUTPATIENT)
Dept: SURGERY | Facility: CLINIC | Age: 43
End: 2023-10-25
Payer: COMMERCIAL

## 2023-10-25 VITALS — WEIGHT: 230 LBS | HEIGHT: 61 IN | BODY MASS INDEX: 43.43 KG/M2

## 2023-10-25 DIAGNOSIS — K91.2 POSTOPERATIVE MALABSORPTION: ICD-10-CM

## 2023-10-25 DIAGNOSIS — Z98.84 S/P BARIATRIC SURGERY: ICD-10-CM

## 2023-10-25 DIAGNOSIS — E66.813 CLASS 3 SEVERE OBESITY DUE TO EXCESS CALORIES WITH BODY MASS INDEX (BMI) OF 40.0 TO 44.9 IN ADULT, UNSPECIFIED WHETHER SERIOUS COMORBIDITY PRESENT (H): Primary | ICD-10-CM

## 2023-10-25 DIAGNOSIS — E66.01 CLASS 3 SEVERE OBESITY DUE TO EXCESS CALORIES WITH BODY MASS INDEX (BMI) OF 40.0 TO 44.9 IN ADULT, UNSPECIFIED WHETHER SERIOUS COMORBIDITY PRESENT (H): Primary | ICD-10-CM

## 2023-10-25 PROCEDURE — 99214 OFFICE O/P EST MOD 30 MIN: CPT | Mod: VID | Performed by: EMERGENCY MEDICINE

## 2023-10-25 RX ORDER — SERTRALINE HYDROCHLORIDE 100 MG/1
200 TABLET, FILM COATED ORAL
COMMUNITY
End: 2023-10-25 | Stop reason: ALTCHOICE

## 2023-10-25 NOTE — PATIENT INSTRUCTIONS
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     2. Continue  beverages from meals and hydrating well between meals to hit your 64-80oz of water daily.    3. Meals every 5 hours should be tolerated best for good energy and avoiding low blood sugars/low energy.    4. Wegovy 2.4mg/week well tolerated and weight is responding well. We'll continue through this winter as long as good tolerability continues.    5. Continue mindful bariatric vitamin intake and given your lower end of range B12, using 500mcg daily is recommended and sublingual forms are always more reliably absorbed. If using 1000mcg, then 3 days weekly should be adequate.          LEAN PROTEIN SOURCES  Getting 20-30 grams of protein, 3 meals daily, is appropriate for most people, some need more but more than about 40 grams per meal is not useful.  General rule is drinking one ounce of water per gram of protein eaten over the course of the day:  70 grams of protein each day, drink 70 oz of water.  Protein Source Portion Calories Grams of Protein                           Nonfat, plain Greek yogurt    (10 grams sugar or less) 3/4 cup (6 oz)  12-17   Light Yogurt (10 grams sugar or less) 3/4 cup (6 oz)  6-8   Protein Shake 1 shake 110-180 15-30   Skim/1% Milk or lactose-free milk 1 cup ( 8 oz)  8   Plain or light, flavored soymilk 1 cup  7-8   Plain or light, hemp milk 1 cup 110 6   Fat Free or 1% Cottage Cheese 1/2 cup 90 15   Part skim ricotta cheese 1/2 cup 100 14   Part skim or reduced fat cheese slices 1 ounce 65-80 8     Mozzarella String Cheese 1 80 8   Canned tuna, chicken, crab or salmon  (canned in water)  1/2  cup 100 15-20   White fish (broiled, grilled, baked) 3 ounces 100 21   [place]/Tuna (broiled, grilled, baked) 3 ounces 150-180 21   Shrimp, Scallops, Lobster, Crab 3 ounces 100 21   Pork loin, Pork Tenderloin 3 ounces 150 21   Boneless, skinless chicken /turkey breast                          (broiled, grilled, baked) 3 ounces 120 21   [place], [place], [place], and Venison 3 ounces 120 21   Lean cuts of red meat and pork (sirloin,   round, tenderloin, flank, ground 93%-96%) 3 ounces 170 21   Lean or Extra Lean Ground Turkey 1/2 cup 150 20   90-95% Lean [place] Burger 1 david 140-180 21   Low-fat casserole with lean meat 3/4 cup 200 17   Luncheon Meats                                                        (turkey, lean ham, roast beef, chicken) 3 ounces 100 21   Egg (boiled, poached, scrambled) 1 Egg 60 7   Egg Substitute 1/2 cup 70 10   Nuts (limit to 1 serving per day)  3 Tbsp. 150 7   Nut Danube (peanut, almond)  Limit to 1 serving or less daily 1 Tbsp. 90 4   Soy Burger (varies) 1  15   Garbanzo, Black, Briggs Beans 1/2 cup 110 7   Refried Beans 1/2 cup 100 7   Kidney and Lima beans 1/2 cup 110 7   Tempeh 3 oz 175 18   Vegan crumbles 1/2 cup 100 14   Tofu 1/2 cup 110 14   Chili (beans and extra lean beef or turkey) 1 cup 200 23   Lentil Stew/Soup 1 cup 150 12   Black Bean Soup 1 cup 175 12       On-the-Go Breakfast Ideas  As of 2015, the latest research shows what a huge impact eating breakfast has on losing weight and feeling your best. People lose more weight when they make breakfast their biggest meal of the day compared to Dinner, but even if you cannot go to that degree, getting a breakfast that has at least 20 grams of protein and even a moderate amount of fat is ideal for maintaining good energy through the day and limits overeating in the evening hours.  The following are some quick and easy suggestions for at least getting something of substance into your body in the morning.  Enjoy!    Eating breakfast  within 90 minutes of waking up is an important part of taking care of your body on a restricted calorie diet plan.  After sleeping for hours, your body is in need of fuel.  An ideal breakfast is a combination of protein, whole-grain carbohydrates, or fruit.  Here s why:    -Protein digests very slowly in the body, helping you feel more satisfied.  -Whole grains provide dietary fiber, which also digests slowly and helps keep your gut clean.  -Fruit is a great source of vitamins, minerals, and fiber.     Each one of these breakfast combinations has between 200-300 calories and 15-20 grams of protein.  Feel free to mix and match!    Bone Broth (chicken bone broth or beef bone broth) is a great way to boost protein content. 8oz of bone broth will typically have 9-12grams of protein for 40kcal of energy.    Protein: Choose  -1/2 cup low-fat cottage cheese  -2 hard boiled eggs , or one cooked in olive oil (low/slow heat).  -1 low fat string cheese stick  -1 Tableson natural peanut butter  -DianDian vegetarian sausage david (found in freezer section)  -1 slice lowfat cheese  -6 oz 2% or lowfat Greek yogurt, such as Fage or Oikos.    PLUS    Whole Grains:  Choose   -1 whole wheat English muffin  -1 whole wheat dinorah, half  -1/2 Fiber One frozen muffin, thawed  -1/2 Fiber One toaster pastry  -1 whole wheat bagel thin  -1/2 cup Kashi cereal  -1 Kashi waffle (or other whole grain high-fiber waffle)  Aim for whole grain/sprouted breads with at least 3g of fiber/slice if having bread. [place] is one such brand.    OR    Fruit: Choose  -1/3 cup blueberries  -1/2 banana (or a plantain- similar to a banana, yet smaller)  -1/2 cup cantaloupe cubes  -1 small apple  -1 small orange  -1/2 cup strawberries  -handful raspberries/blackberries (each berry is about 1 calorie).    *Adapted from [employer], Fall 20    Ten Breakfasts Under 250 calories    Ideally, getting between 350-600 calories  (depending on starting  height and weight)for breakfast is ideal for avoiding hunger later in the day, adjust/add to the following accordingly:    One- 250 calories, 8.5 g protein  1 slice whole-grain toast   1 Tbsp peanut butter    banana    Two- 250 calories, 8 g protein    cup nonfat/lowfat yogurt  1/3rd cup diced no-sugar peaches  1/3rd cup cereal (like Special K, Cheerios, or bran flakes)    Three- 250 calories, 25 g protein  1 egg scrambled with 1 oz skim milk    cup shredded cheddar    whole grain English muffin  1 oz Huson varma  1 tsp margarine spread    Four- 225 calories, 25 g protein  1/2 cup Kashi Go-Lean cereal    cup skim milk mixed with 1 scoop Bariatric Advantage protein powder    cup no-sugar diced pears    Five- 250 calories, 20 g protein    cup oatmeal prepared with skim milk, 1 scoop protein powder, and sugar-free maple syrup    Six- 200 calories, 5 g protein  1 whole grain waffle, toasted  1 tablespoon creamy peanut or almond butter    Seven-  250 calories, 19 g protein  Breakfast sandwich: 1 slice whole grain toast, cut in half.  Add 1 scrambled egg and one slice cheddar  cheese.    Eight-  250 calories, 15 g protein  2 eggs scrambled with 1/3 cup frozen spinach (heat before adding to eggs) and 2 tablespoons low fat cream cheese.    Nine-  150 calories, 15 g protein  2/3rd cup cottage cheese    cup cantaloupe    Ten- 200 calories, 20 g protein  Fruit smoothie made with 4 oz. nonfat Greek yogurt,   cup berries, 1 scoop protein powder, and 4 oz skim milk.    Ten Lunches Under 250 Calories    Aim for lunch to be around 300-400 calories a day when trying to lose weight and get that protein in!    One- 200 calories, 11 g protein  1/3 cup tuna salad made with light mccormick on 1 slice whole grain bread  1 small peeled apple    Two- 250 calories, 16 g protein  1/3 cup lowfat cottage cheese    cup cooked green beans    small fruit cocktail (in natural juice)    Three- 200 calories, 11 g protein    grilled cheese sandwich on  whole grain bread with lowfat cheese  2/3rd cup of tomato soup    Four- 250 calories, 22 g protein  Deli wrap: 1 oz sliced turkey, 1 oz sliced ham, 1 oz sliced chicken rolled up with 1 slice low-fat cheese  1 small orange    Five- 250 calories, 28 g protein  2/3rd cup chili with 1 oz shredded cheese  4 saltine crackers    Six- 250 calories, 22 g protein  1 cup fresh spinach with 2 oz chicken, 1/3rd cup mandarin oranges, and 2 tablespoons sliced almonds with 1 tablespoon  vinaigrette dressing    Seven- 200 calories, 11 g protein  1 Tbsp sugar-free preserves and 1 Tbsp peanut butter on 1 slice whole grain toast    cup nonfat/lowfat Greek yogurt    Eight- 250 calories, 18 g protein  1 small soft-shell chicken taco with 1 oz shredded cheese, lettuce, tomato, salsa, and 1 Tbsp light sour cream    cup black beans    Nine- 225 calories, 13 g protein  2 ounces baked chicken  1/4 cup mashed potatoes    cup green beans    Ten- 200 calories, 21 g protein  Deli dinorah: 2 oz roast beef or other deli meat with 1 tsp Javi mayonnaise and sliced tomato, onion, and lettuce  1/3rd cup cottage cheese      Ten Dinners Under 300 calories    If you're eating a large breakfast and medium lunch, keep dinner small.  300-400 calories is ideal for most people depending on their caloric needs.    One- 300 calories, 12 g protein  1-inch thick slice of turkey meatloaf    cup baked butternut squash    Two- 200 calories, 9 g protein  Bread-less BLT: 3 slices turkey varma, sliced tomato, wrapped in a large lettuce leaf    cup peeled fruit    Three- 275 calories, 36 g protein  3 oz roasted chicken    cup cooked broccoli    cup shredded cheddar cheese    cup unsweetened applesauce    Four- 200 calories, 25 g protein  3 oz baked tilapia  1/3rd cup cooked carrots    cup yogurt    Five- 250 calories, 20 g protein  Grilled ham  n  Swiss: spread 2 tsp ghee or butter on 1 slice of whole grain bread.  Cut bread in half, layer 2 oz deli ham with 1 piece of  Swiss cheese and grill until cheese is melted.    cup cooked vegetables    Six- 250 calories, 18 g protein  Vegetarian cheeseburger: 1 [place] cheeseburger topped with lettuce, onion, tomato, and ketchup/mustard    cup sweet potato fries    Seven- 250 calories, 18 g protein  Pork pot roast: 2 oz roasted pork loin, 1/3rd cup roasted carrots,   medium potato, cooked with   cup gravy    Eight- 330 calories, 25 g protein  2 oz meatballs (about 2 small meatballs)    cup spaghetti sauce  1/2 piece toast topped with 1 tsp ghee or butterand topped with garlic powder, toasted in oven    Nine- 250 calories, 16 g protein  Mexican pizza: one 8  corn tortilla topped with 2 oz chicken,   cup salsa, 2 tablespoons black beans, 2 tablespoons shredded cheese.  Bake until cheese is melted.    Ten- 250 calories, 22 g protein  Shrimp stir-reid: 3 oz cooked shrimp, 1/6th onion,   pepper,   cup chopped carrots sautéed in 1 tablespoon olive oil, topped with 2 tablespoons stir reid sauce and a pinch of sesame seeds        150 Calories or Less Snack Ideas   1 hardboiled egg with   cup berries  1 small apple with 1 hardboiled egg  10 almonds with   cup berries  2 clementines with 1 light string cheese  1 light string cheese with   sliced apple  1 light string cheese wrapped in 2 slices of turkey  4 100% whole wheat crackers (e.g. Triscuit) with 1 light string cheese    c. cottage cheese with   cup fruit and 1 Tbsp sunflower seeds     cup cottage cheese with   of an avocado     can tuna fish with 1 cup sliced cucumbers     cup roasted garbanzo beans with paprika and cayenne pepper    baked sweet potato with   cup chili beans or   cup cottage cheese  2 oz. nitrate free turkey slices with 1 cup carrots  1 container (6 oz) of low sugar (less than 10 grams of sugar) greek yogurt   3 Tablespoons of hummus with 1 cup sliced bell peppers   2 Tablespoons of hummus with 15 baby carrots  4 Tablespoons ranch dip made with plain Greek Yogurt and 3 mini  cucumbers  1/4 cup nuts (any kind)  1 Tablespoon peanut butter with 1 stalk celery   1 dill pickle wrapped in 1-2 slices of deli ham with 1 tsp of mayonnaise/mustard.      [facility]  Nutritional Guidelines  Gastric Bypass 18 Months Post Op and Beyond    General Guidelines and Helpful Hints:  Eat 3 meals per day + protein supplement(s). No snacks between meals.  Do not skip meals.  This can cause overeating at the next meal and will prevent adequate protein and nutritional intake.  Aim for 60-80 grams of protein per day.  Always eat your protein first. This assists with optimal nutrition and helps you stay full longer.  Depending on your portion size, you may need to drink approved protein supplement between meals to achieve protein goals. Follow recommendations of your Dietitian.   Eat your protein first, and then follow with fiber.   It is not necessary to count your fiber, but 15-20 grams per day is recommended.    Add fiber by including fruits, vegetables, whole grains, and beans.   Portions should remain about 1 cup per meal. Use measuring cups to be accurate.  Continue to use saucer/salad plates, infant/toddler silverware to keep portion sizes small and take small bites.  Eat S-L-O-W-L-Y to make each meal last 20-30 minutes. Always stop eating when satisfied.  Continue to use caution with foods containing skins, peels or membranes. Chew well!  Aim for 64 oz. of calorie-free fluids daily.  Continue to avoid caffeine and carbonation. If you choose to drink alcohol, do so in moderation.   Remember to avoid drinking during meals, 15-30 minutes before and 30 minutes after.  Exercise is benavidez for continued weight loss and weight maintenance. 150 minutes weekly of moderate aerobic activity or 75 minutes of vigorous with 2 days or more a week of strength training. Try to get 20% or more of your steps each day at a brisk pace, as though hurrying to a bus stop. Look to get stronger this year.  If having  "trouble tolerating meat, try using a crock-pot, tinfoil tent, steamer or other moist cooking method to create tender meats. Add broth or low-fat gravy to help meat stay moist.   Avoid high sugar and high fat foods to prevent dumping syndrome.  Check nutrition labels for less than 10 grams of sugar and less than 10 grams of fat per serving.  Continue Taking Vitamins/Minerals:  1000 mcg of Sublingual B-12 at least 3 days weekly to average 350-500mcg/day. If using 2500mcg lozenges, 2 weekly.  If 5000 mcg, once weekly dosing works.  1 Complete Multivitamin with 18mg Iron twice daily (chewable or swallow tabs). Often sold as \"women's one a day\" if tablet but take twice daily.  500-600 mg Calcium Citrate twice daily (chewable or swallow tabs).  5000 IU Vitamin D3 daily.  If menstruating, you may need closer to 60mg of iron daily to prevent iron deficiency. An occasional \"boost\" of extra iron supplement during/after is reasonable if heavy flow.    Sample Grocery List    Protein:  Fat free Greek or light yogurt (less than 10 grams sugar)  Fat free or low-fat cottage cheese  String cheese or reduced fat cheese slices  Tuna, salmon, crab, egg, or chicken salad made with light or fat free mayonnaise  Egg or Egg Substitute  Lean/extra lean turkey, beef, bison, venison (ground, sirloin, round, flank)  Pork loin or tenderloin (grilled, baked, broiled)  Fish such as salmon, tuna, trout, tilapia, etc. (grilled, baked, broiled)  Tender cuts of lean (skinless) turkey or chicken  Lean deli meats: turkey, lean ham, chicken, lean roast beef  Beans such as kidney, garbanzo, black, mendez, or low-fat/fat free refried beans  Peanut butter (natural preferred). Limit to 1 Tbsp. per day.  Low-fat meatloaf (made with lean ground beef or turkey)  Sloppy Joes made with low-sugar ketchup and lean ground beef or turkey  Soy or vegetable protein (i.e. vegan crumbles, soy/veggie burger, tofu)  Hummus    Vegetables:  Fresh: cooked or raw (as " tolerated)  Frozen vegetables  Canned vegetables (low sodium or no salt added, rinse before cooking/eating)  (Ok to have skins/peels/membranes/seeds - just chew well)    Fruits:  Fresh fruit  Frozen fruit (no sugar added)  Canned fruit (packed in its own juice, NOT syrup)  (Ok to have skins/peels/membranes/seeds - just chew well)    Starch:  Unsweetened whole-grain hot cereal (or high fiber cold cereal, dry)  Toasted whole wheat bread or [place] Thins  Whole grain crackers  Baked /boiled/mashed potato/sweet potato  Cooked whole grain pasta, brown rice, or other cooked whole grains  Starchy vegetables: corn, peas, winter squash    Protein Supplement:   Ready to drink protein shake with:  15-30 grams protein per serving  Less than 10 grams total carbohydrate per serving   Protein powder mixed with:   Skim or 1% milk  Low fat or fat free Lactaid milk, plain or no sugar added soymilk  Water     Fats: (use in moderation)  1 teaspoon of soft tub margarine  1 teaspoon olive oil, canola oil, or peanut oil  1 tablespoon of low-fat mccormick or salad dressing     Sample Menu for 18+ months after Gastric Bypass    You do NOT need to eat/drink the full portion sizes listed below  Always stop when you are satisfied    Breakfast   cup 1% cottage cheese     cup mixed berries   Lunch 2 oz lean roast beef on   [place] Thin with 1 tsp. light mccormick    small tomato, chopped, mixed with 1 tsp. light vinaigrette dressing   Supplement Approved protein supplement (if needed between meals)   Dinner 2 oz grilled salmon    cup salad greens with 1 tsp. light salad dressing and 1 tsp. ground flax seed    cup quinoa or brown rice     Breakfast   cup egg substitute with   cup sautéed chopped vegetables  2 light [place] Krisp crackers   Lunch Tuna Melt:   cup tuna mixed with 1 tsp. light mccormick over   [place] Thin. Top with 2-3 slices cucumber and 1 oz slice of low fat cheese   Supplement 1 cup skim milk (if needed between meals)   Dinner 3 oz  grilled,  broiled, or baked seasoned skinless chicken breast    cup asparagus     Breakfast   cup plain oatmeal made with skim or 1% milk with 1 Tbsp. flavored/unflavored protein powder added  1 mozzarella string cheese   Lunch 2 oz deli turkey breast  1/3 cup salad with 1 tsp. light salad dressing, 1/8 of a whole avocado and 1 Tbsp. sunflower seeds   Dinner 3 oz. pork loin made in a crock pot, seasoned with a spice rub    cup cooked carrots   Supplement Approved protein supplement (if needed between meals)     Breakfast 1 cup breakfast casserole made with egg substitute, turkey sausage,  and steamed, chopped bell peppers   Supplement  1 cup light Greek yogurt (if needed between meals)   Lunch 2 oz. teriyaki turkey    cup mashed sweet potato with 1-2 spritzes of spray butter    cup fresh pineapple   Dinner 3 oz low fat meatloaf    cup roasted garlic zucchini     Breakfast   cup leftover breakfast casserole    cup no sugar added applesauce with 1 Tbsp. unflavored protein powder and a sprinkle of cinnamon    Lunch 3 oz shrimp with 1-2 Tbsp. low-sugar cocktail sauce for dipping    c. whole wheat pasta drizzled with   tsp. olive oil   Supplement 1 cup skim/1% milk with scoop of protein powder (if needed between meals)   Dinner Grilled, seasoned kebob with 2 oz lean beef and   cup vegetables     Breakfast Breakfast pizza:   [place] Thin spread with 1 Tbsp. low sugar spaghetti sauce,   cup shredded low fat cheese, melted and 1 slice of [place] varma     cup fresh fruit mixed with chopped almonds   Lunch   cup black bean soup  4-5 whole grain crackers   Dinner 3 oz  tilapia with lemon pepper seasoning    cup stewed tomatoes   Supplement 1 string cheese (if needed between meals)     Breakfast 2 hard boiled eggs (discard 1 egg yolk)    whole wheat English Muffin with 1 tsp. low sugar jelly   Lunch   cup leftover black bean soup topped with 1-2 Tbsp. low fat cheese  2-3 light [place] Krisp crackers   Supplement Approved protein  supplement (if needed between meals)   Dinner 3 oz sirloin steak    cup steamed broccoli

## 2023-10-25 NOTE — LETTER
10/25/2023         RE: Leah John  [address] [place] [address]        Dear Colleague,    Thank you for referring your patient, Leah John, to the [facility] [place]. Please see a copy of my visit note below.    Bariatric Follow Up Visit with a History of Previous Bariatric Surgery     Date of visit: 10/25/2023  Physician: Manoj Stover MD, MD  Primary Care Provider:  Keiry Barrientos  Leah BRENT Alvaro   43 year old  female    Date of Surgery: 7/15/15  Initial Weight: 311 lbs  Initial BMI: 59.7  Today's Weight:   Wt Readings from Last 1 Encounters:   10/25/23 104.3 kg (230 lb)     Weight history:   Wt Readings from Last 4 Encounters:   10/25/23 104.3 kg (230 lb)   06/13/23 108 kg (238 lb)   05/17/23 108 kg (238 lb)   03/07/23 111.6 kg (246 lb)      Body mass index is 43.46 kg/m .      Assessment and Plan     Assessment: Leah is a 43 year old year old female who is just over 8 years s/p  Pavithra en Y Gastric Bypass with Dr. Olivier.  After her last pregnancy, she'd started up Phentermine therapy in 2022 and then transition to Wegovy in February of 2023 (at 246 lbs at the time). She's found her Wegovy to be well tolerated at the 2.4mg/week dose and weight is down 16 lbs over the last 7 months or so, a 6.5% total body weight reduction and down 26% total body weight reduction from her preoperative, introductory weight of 311 lbs (81 lbs lost).  Weight reduction mitigated in the last year by infant/toddler in the house and reduction in self care.    Her 8/10/23 annual labs showed good supplementation overall, low end of normal range b12.    Class III obesity remains and combined surgical/medication tools are indicated and we'll plan to continue Wegovy use accordingly this winter/spring until stable weight achieved.    Leah John feels as if she is back on track to  achieve the goals she hoped to accomplish through bariatric surgery and weight  loss.    No diagnosis found.      Current Outpatient Medications:      calcium citrate (CITRACAL) 950 (200 Ca) MG tablet, Take 1 tablet by mouth 2 times daily, Disp: , Rfl:      ferrous fumarate 65 mg, [place]. FE,-Vitamin C 125 mg (VITRON-C)  MG TABS tablet, Use twice daily, 20minutes after meals for 30 days then reduce to once daily dosing thereafter for 2 months until gone., Disp: 120 tablet, Rfl: 0     FLUoxetine (PROZAC) 20 MG capsule, Take 1 capsule (20 mg) by mouth daily, Disp: 90 capsule, Rfl: 4     Multiple Vitamin (MULTIVITAMIN ADULT PO), , Disp: , Rfl:      Semaglutide-Weight Management (WEGOVY) 2.4 MG/0.75ML SOAJ, Inject 2.4 mg Subcutaneous every 7 days for 270 days 4 pens, Disp: 3 mL, Rfl: 9     vitamin B-12 (CYANOCOBALAMIN) 500 MCG tablet, , Disp: , Rfl:      Vitamin D (Cholecalciferol) 25 MCG (1000 UT) TABS, , Disp: , Rfl:      sertraline (ZOLOFT) 100 MG tablet, Take 200 mg by mouth (Patient not taking: Reported on 10/25/2023), Disp: , Rfl:     Plan:  Continue mindful approach to meals with protein first, likely supplementing with a protein shake once daily in mid afternoon can really help satiety/energy 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     2. Continue  beverages from meals and hydrating well between meals to hit your 64-80oz of water daily.    3. Meals every 5 hours should be tolerated best for good energy and avoiding low blood sugars/low energy.    4. Wegovy 2.4mg/week well tolerated and weight is responding well. We'll continue through this winter as long as good tolerability continues.    5. Continue mindful bariatric vitamin intake and given your lower end of range B12, using 500mcg daily is recommended and sublingual forms are always more reliably absorbed. If using 1000mcg, then 3 days weekly should be adequate.    No  "follow-ups on file.    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   Injects in abdomen.     Patient Concerns: follow up weight management  Appetite (1-10): controlled well  GERD: no.    Reviewed whether any need/indication for screening EGD today and we will n/a.  Typically, a screening EGD is recommend post op year 2-3 if no symptoms to assess health of esophagus/bariatric surgery and sooner if difficult to control GERD or persistent pain/dysphagia sx despite behavior modification.    Medication changes: off zoloft, on prozac now.    Vitamin Intake:   B-12   yes   MVI  yes   Vitamin D  homer   Calcium   yes     Other  N/a              LABS:  reviewed at last visit. Reminded about good B12 use.    Nausea no  Vomiting no  Constipation no  Diarrhea no  Rashes no  Hair Loss no  Reactive Hypoglycemia no  Light Headedness no   Moods good. 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       Most recent labs:  Lab Results   Component Value Date    WBC 5.7 08/10/2023    HGB 11.3 (L) 08/10/2023    HCT 35.7 08/10/2023    MCV 80 08/10/2023     08/10/2023     Lab Results   Component Value Date    CHOL 180 10/31/2022     Lab Results   Component Value Date    HDL 54 10/31/2022     No components found for: \"LDLCALC\"  Lab Results   Component Value Date    TRIG 78 10/31/2022     No results found for: \"CHOLHDL\"  Lab Results   Component Value Date    ALT 20 08/10/2023    AST 31 08/10/2023    ALKPHOS 75 08/10/2023     No results found for: \"HGBA1C\"  Lab Results   Component Value Date    B12 333 08/10/2023     No components found for: \"VITDT1\"  Lab Results   Component Value Date    JAMAR 9 08/10/2023     Lab Results   Component Value Date    PTHI 47 08/10/2023     Lab Results   Component Value Date    ZN 75.6 08/10/2023     Lab Results   Component Value Date    VIB1WB 125 08/10/2023     Lab Results   Component Value Date    TSH 3.84 " "08/10/2023     No results found for: \"TEST\"      Social History     Social History     Socioeconomic History     Marital status:      Spouse name: Not on file     Number of children: Not on file     Years of education: Not on file     Highest education level: Not on file   Occupational History     Not on file   Tobacco Use     Smoking status: Never     Smokeless tobacco: Never   Vaping Use     Vaping Use: Never used   Substance and Sexual Activity     Alcohol use: Yes     Alcohol/week: 2.5 standard drinks of alcohol     Types: 3 Standard drinks or equivalent per week     Drug use: No     Sexual activity: Yes     Partners: Male     Birth control/protection: I.U.D., Other   Other Topics Concern     Parent/sibling w/ CABG, MI or angioplasty before 65F 55M? No   Social History Narrative     Not on file     Social Determinants of Health     Financial Resource Strain: Not on file   Food Insecurity: Not on file   Transportation Needs: Not on file   Physical Activity: Not on file   Stress: Not on file   Social Connections: Not on file   Interpersonal Safety: Not on file   Housing Stability: Not on file       Past Medical History     Past Medical History:   Diagnosis Date     Anisometropia      Anxiety      Cervical intraepithelial neoplasia III 2013     ALFRED III (cervical intraepithelial neoplasia III) 7/20/2012 2005, 2006 NIL paps 10/17/07 ASCUS, neg HPV 2008, 2009, 2011 NIL paps 6/25/12 ASC-H 7/20/12 Colpo bx ALFRED I, II, III, ECC neg 9/13/12 LEEP ALFRED I & III, margins neg, ECC neg 4/8/13 NIL pap, neg HPV 10/3/13 NIL pap, neg HPV 1/15/15 NIL pap, neg HPV Above per Care Everywhere 12/29/16 NIL pap, neg HPV 8/17/21 NIL pap, +HR HPV (not 16/18). Plan: colposcopy due before 11/17/21 9/29/21 [place]: atypical squa     Depression     20s; on medications zoloft; prozac x 1 year     Depressive disorder      Dyslipidemia 2014     Hyperlipidemia with target LDL less than 160 1/11/2011    Formatting of this note might be " different from the original. ICD 10     Hyperparathyroidism (H) 2019     Hypothyroidism      Iron deficiency anemia 2018     Knee pain 3/14/2011     Low vitamin B12 level 2019     Metabolic syndrome      Morbid obesity (H)      Morbid obesity with BMI of 50.0-59.9, adult (H) 3/25/2015     MTHFR mutation     heterozygous     PCOS (polycystic ovarian syndrome)      Plantar fasciitis 6/10/2022     Polycystic ovary syndrome      Pre-diabetes      Prothrombin gene mutation (H) 2017    heterozygous     S/P bariatric surgery 7/15/2015     Strabismus      Stress fracture of right foot, initial encounter 6/10/2022     Vitamin D deficiency 2019     Past Surgical History:   Procedure Laterality Date     ABDOMEN SURGERY  2015    Gastric bypass     BIOPSY CERVICAL, LOCAL EXCISION, SINGLE/MULTIPLE      ALFRED III      SECTION N/A 2018    Procedure: PRIMARY  SECTION;  Surgeon: Becky Rg MD;  Location: [facility] L[facility];  Service:      COLONOSCOPY       EXCISE GANGLION WRIST Right      EYE SURGERY      x3; as a child     LEEP TX, CERVICAL       TN LAP GASTRIC BYPASS/CRISTIANE-EN-Y N/A 07/15/2015    Mark Olivier MD; [facility] Initial Weight 311 lbs, BMI 59.7     SOFT TISSUE SURGERY      Ganglion cyst removal       Problem List     Patient Active Problem List   Diagnosis     Knee pain     Morbid obesity (H)     Anisometropia     Anxiety     Hyperlipidemia with target LDL less than 160     Hyperparathyroidism (H24)     Hypothyroidism     Low vitamin B12 level     Metabolic syndrome     MTHFR mutation     PCOS (polycystic ovarian syndrome)     Pre-diabetes     S/P bariatric surgery     Vitamin D deficiency     Morbid obesity with BMI of 50.0-59.9, adult (H)     ALFRED III (cervical intraepithelial neoplasia III)     Stress fracture of right foot, initial encounter     Plantar fasciitis     Medications     [unfilled]  Surgical History     Past Surgical  "History  She has a past surgical history that includes leep tx, cervical (); Excise ganglion wrist (Right); Eye surgery; Pr Lap Gastric Bypass/Pavithra-En-Y (N/A, 07/15/2015); Biopsy cervical, local excision, single/multiple ();  Section (N/A, 2018); Abdomen surgery (2015); colonoscopy (); and Soft tissue surgery ().    Objective-Exam     Constitutional:  Ht 1.549 m (5' 1\")   Wt 104.3 kg (230 lb)   BMI 43.46 kg/m    [unfilled]   General:  Pleasant and in no acute distress   Eyes:  EOMI  ENT:  Airway patent    Neck:  Respiratory: Normal respiratory effort, no cough, .  CV:    Gastrointestinal:   Musculoskeletal: muscle mass WNL  Skin: color fair,  hair red,   Psychiatric: alert and oriented X3, mood and affect normal    Counseling     We reviewed the important post op bariatric recommendations:  -eating 3 meals daily  -eating protein first, getting >60gm protein daily  -eating slowly, chewing food well  -avoiding/limiting calorie containing beverages  -drinking water 15-30 minutes before or after meals  -limiting restaurant or cafeteria eating to twice a week or less    We discussed the importance of restorative sleep and stress management in maintaining a healthy weight.  We discussed the National Weight Control Registry healthy weight maintenance strategies and ways to optimize metabolism.  We discussed the importance of physical activity including cardiovascular and strength training in maintaining a healthier weight.    We discussed the importance of life-long vitamin supplementation and life-long  follow-up.    Leah was reminded that, to avoid marginal ulcers she should avoid tobacco at all, alcohol in excess, caffeine in excess, and NSAIDS (unless indicated for cardioprotection or othewise and opposed by a PPI).    Manoj Stover MD    [facility].  10/25/2023  7:39 AM  [phone] (clinic phone)  [phone] (fax)    No images are attached to the " encounter.  Medical Decision Making:    Leah John is 43 year old  female who presents for a billable video visit today.    How would you like to obtain your AVS? MyChart  If dropped from the video visit, the video invitation should be resent by: Text to cell phone: [phone]  Will anyone else be joining your video visit? No      Video Start Time: 11:29 AM    Are there any specific questions or needs that you would like addressed at your visit today? No questions or concerns today.           Video-Visit Details    Type of service:  Video Visit    Platform used for Video Visit: [employer]    Video End Time (time video stopped): 11:46 AM    Originating Location (pt. Location): Home      Distant Location (provider location):  On-site    Distant Location (provider location):  [facility] SURGERY [facility] AND BARIATRICS [facility]      30 minutes spent by me on the date of the encounter doing chart review, history and exam, documentation and further activities per the note      Again, thank you for allowing me to participate in the care of your patient.        Sincerely,        Manoj Stover MD

## 2023-10-25 NOTE — PROGRESS NOTES
Bariatric Follow Up Visit with a History of Previous Bariatric Surgery     Date of visit: 10/25/2023  Physician: Manoj Stover MD, MD  Primary Care Provider:  Keiry Barrientos  Leah Duranjane   43 year old  female    Date of Surgery: 7/15/15  Initial Weight: 311 lbs  Initial BMI: 59.7  Today's Weight:   Wt Readings from Last 1 Encounters:   10/25/23 104.3 kg (230 lb)     Weight history:   Wt Readings from Last 4 Encounters:   10/25/23 104.3 kg (230 lb)   06/13/23 108 kg (238 lb)   05/17/23 108 kg (238 lb)   03/07/23 111.6 kg (246 lb)      Body mass index is 43.46 kg/m .      Assessment and Plan     Assessment: Leah is a 43 year old year old female who is just over 8 years s/p  Pavithra en Y Gastric Bypass with Dr. Olivier.  After her last pregnancy, she'd started up Phentermine therapy in 2022 and then transition to Wegovy in February of 2023 (at 246 lbs at the time). She's found her Wegovy to be well tolerated at the 2.4mg/week dose and weight is down 16 lbs over the last 7 months or so, a 6.5% total body weight reduction and down 26% total body weight reduction from her preoperative, introductory weight of 311 lbs (81 lbs lost).  Weight reduction mitigated in the last year by infant/toddler in the house and reduction in self care.    Her 8/10/23 annual labs showed good supplementation overall, low end of normal range b12.    Class III obesity remains and combined surgical/medication tools are indicated and we'll plan to continue Wegovy use accordingly this winter/spring until stable weight achieved.    Leah John feels as if she is back on track to  achieve the goals she hoped to accomplish through bariatric surgery and weight loss.    No diagnosis found.      Current Outpatient Medications:     calcium citrate (CITRACAL) 950 (200 Ca) MG tablet, Take 1 tablet by mouth 2 times daily, Disp: , Rfl:     ferrous fumarate 65 mg, [place]. FE,-Vitamin C 125 mg (VITRON-C)  MG TABS tablet, Use twice daily, 20minutes  after meals for 30 days then reduce to once daily dosing thereafter for 2 months until gone., Disp: 120 tablet, Rfl: 0    FLUoxetine (PROZAC) 20 MG capsule, Take 1 capsule (20 mg) by mouth daily, Disp: 90 capsule, Rfl: 4    Multiple Vitamin (MULTIVITAMIN ADULT PO), , Disp: , Rfl:     Semaglutide-Weight Management (WEGOVY) 2.4 MG/0.75ML SOAJ, Inject 2.4 mg Subcutaneous every 7 days for 270 days 4 pens, Disp: 3 mL, Rfl: 9    vitamin B-12 (CYANOCOBALAMIN) 500 MCG tablet, , Disp: , Rfl:     Vitamin D (Cholecalciferol) 25 MCG (1000 UT) TABS, , Disp: , Rfl:     sertraline (ZOLOFT) 100 MG tablet, Take 200 mg by mouth (Patient not taking: Reported on 10/25/2023), Disp: , Rfl:     Plan:  Continue mindful approach to meals with protein first, likely supplementing with a protein shake once daily in mid afternoon can really help satiety/energy 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     2. Continue  beverages from meals and hydrating well between meals to hit your 64-80oz of water daily.    3. Meals every 5 hours should be tolerated best for good energy and avoiding low blood sugars/low energy.    4. Wegovy 2.4mg/week well tolerated and weight is responding well. We'll continue through this winter as long as good tolerability continues.    5. Continue mindful bariatric vitamin intake and given your lower end of range B12, using 500mcg daily is recommended and sublingual forms are always more reliably absorbed. If using 1000mcg, then 3 days weekly should be adequate.    No follow-ups on file.    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   Injects in abdomen.     Patient Concerns: follow up weight management  Appetite (1-10): controlled well  GERD: no.    Reviewed whether any need/indication for  "screening EGD today and we will n/a.  Typically, a screening EGD is recommend post op year 2-3 if no symptoms to assess health of esophagus/bariatric surgery and sooner if difficult to control GERD or persistent pain/dysphagia sx despite behavior modification.    Medication changes: off zoloft, on prozac now.    Vitamin Intake:   B-12   yes   MVI  yes   Vitamin D  homer   Calcium   yes     Other  N/a              LABS:  reviewed at last visit. Reminded about good B12 use.    Nausea no  Vomiting no  Constipation no  Diarrhea no  Rashes no  Hair Loss no  Reactive Hypoglycemia no  Light Headedness no   Moods good. 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       Most recent labs:  Lab Results   Component Value Date    WBC 5.7 08/10/2023    HGB 11.3 (L) 08/10/2023    HCT 35.7 08/10/2023    MCV 80 08/10/2023     08/10/2023     Lab Results   Component Value Date    CHOL 180 10/31/2022     Lab Results   Component Value Date    HDL 54 10/31/2022     No components found for: \"LDLCALC\"  Lab Results   Component Value Date    TRIG 78 10/31/2022     No results found for: \"CHOLHDL\"  Lab Results   Component Value Date    ALT 20 08/10/2023    AST 31 08/10/2023    ALKPHOS 75 08/10/2023     No results found for: \"HGBA1C\"  Lab Results   Component Value Date    B12 333 08/10/2023     No components found for: \"VITDT1\"  Lab Results   Component Value Date    JAMAR 9 08/10/2023     Lab Results   Component Value Date    PTHI 47 08/10/2023     Lab Results   Component Value Date    ZN 75.6 08/10/2023     Lab Results   Component Value Date    VIB1WB 125 08/10/2023     Lab Results   Component Value Date    TSH 3.84 08/10/2023     No results found for: \"TEST\"      Social History     Social History     Socioeconomic History    Marital status:      Spouse name: Not on file    Number of children: Not on file    Years of education: Not on file    Highest education level: Not on file   Occupational " History    Not on file   Tobacco Use    Smoking status: Never    Smokeless tobacco: Never   Vaping Use    Vaping Use: Never used   Substance and Sexual Activity    Alcohol use: Yes     Alcohol/week: 2.5 standard drinks of alcohol     Types: 3 Standard drinks or equivalent per week    Drug use: No    Sexual activity: Yes     Partners: Male     Birth control/protection: I.U.D., Other   Other Topics Concern    Parent/sibling w/ CABG, MI or angioplasty before 65F 55M? No   Social History Narrative    Not on file     Social Determinants of Health     Financial Resource Strain: Not on file   Food Insecurity: Not on file   Transportation Needs: Not on file   Physical Activity: Not on file   Stress: Not on file   Social Connections: Not on file   Interpersonal Safety: Not on file   Housing Stability: Not on file       Past Medical History     Past Medical History:   Diagnosis Date    Anisometropia     Anxiety     Cervical intraepithelial neoplasia III 2013    ALFRED III (cervical intraepithelial neoplasia III) 7/20/2012 2005, 2006 NIL paps 10/17/07 ASCUS, neg HPV 2008, 2009, 2011 NIL paps 6/25/12 ASC-H 7/20/12 Colpo bx ALFRED I, II, III, ECC neg 9/13/12 LEEP ALFRED I & III, margins neg, ECC neg 4/8/13 NIL pap, neg HPV 10/3/13 NIL pap, neg HPV 1/15/15 NIL pap, neg HPV Above per Care Everywhere 12/29/16 NIL pap, neg HPV 8/17/21 NIL pap, +HR HPV (not 16/18). Plan: colposcopy due before 11/17/21 9/29/21 [place]: atypical squa    Depression     20s; on medications zoloft; prozac x 1 year    Depressive disorder     Dyslipidemia 2014    Hyperlipidemia with target LDL less than 160 1/11/2011    Formatting of this note might be different from the original. ICD 10    Hyperparathyroidism (H) 1/24/2019    Hypothyroidism     Iron deficiency anemia 1/20/2018    Knee pain 3/14/2011    Low vitamin B12 level 1/24/2019    Metabolic syndrome     Morbid obesity (H)     Morbid obesity with BMI of 50.0-59.9, adult (H) 3/25/2015    MTHFR mutation      heterozygous    PCOS (polycystic ovarian syndrome)     Plantar fasciitis 6/10/2022    Polycystic ovary syndrome     Pre-diabetes     Prothrombin gene mutation (H) 2017    heterozygous    S/P bariatric surgery 7/15/2015    Strabismus     Stress fracture of right foot, initial encounter 6/10/2022    Vitamin D deficiency 2019     Past Surgical History:   Procedure Laterality Date    ABDOMEN SURGERY  2015    Gastric bypass    BIOPSY CERVICAL, LOCAL EXCISION, SINGLE/MULTIPLE      ALFRED III     SECTION N/A 2018    Procedure: PRIMARY  SECTION;  Surgeon: Becky Rg MD;  Location: [facility];  Service:     COLONOSCOPY      EXCISE GANGLION WRIST Right     EYE SURGERY      x3; as a child    LEEP TX, CERVICAL      CT LAP GASTRIC BYPASS/CRISTIANE-EN-Y N/A 07/15/2015    Mark Olivier MD; [facility] Initial Weight 311 lbs, BMI 59.7    SOFT TISSUE SURGERY      Ganglion cyst removal       Problem List     Patient Active Problem List   Diagnosis    Knee pain    Morbid obesity (H)    Anisometropia    Anxiety    Hyperlipidemia with target LDL less than 160    Hyperparathyroidism (H24)    Hypothyroidism    Low vitamin B12 level    Metabolic syndrome    MTHFR mutation    PCOS (polycystic ovarian syndrome)    Pre-diabetes    S/P bariatric surgery    Vitamin D deficiency    Morbid obesity with BMI of 50.0-59.9, adult (H)    ALFRED III (cervical intraepithelial neoplasia III)    Stress fracture of right foot, initial encounter    Plantar fasciitis     Medications     [unfilled]  Surgical History     Past Surgical History  She has a past surgical history that includes leep tx, cervical (); Excise ganglion wrist (Right); Eye surgery; Pr Lap Gastric Bypass/Cristiane-En-Y (N/A, 07/15/2015); Biopsy cervical, local excision, single/multiple ();  Section (N/A, 2018); Abdomen surgery (2015); colonoscopy (); and Soft tissue surgery ().    Objective-Exam  "    Constitutional:  Ht 1.549 m (5' 1\")   Wt 104.3 kg (230 lb)   BMI 43.46 kg/m    [unfilled]   General:  Pleasant and in no acute distress   Eyes:  EOMI  ENT:  Airway patent    Neck:  Respiratory: Normal respiratory effort, no cough, .  CV:    Gastrointestinal:   Musculoskeletal: muscle mass WNL  Skin: color fair,  hair red,   Psychiatric: alert and oriented X3, mood and affect normal    Counseling     We reviewed the important post op bariatric recommendations:  -eating 3 meals daily  -eating protein first, getting >60gm protein daily  -eating slowly, chewing food well  -avoiding/limiting calorie containing beverages  -drinking water 15-30 minutes before or after meals  -limiting restaurant or cafeteria eating to twice a week or less    We discussed the importance of restorative sleep and stress management in maintaining a healthy weight.  We discussed the National Weight Control Registry healthy weight maintenance strategies and ways to optimize metabolism.  We discussed the importance of physical activity including cardiovascular and strength training in maintaining a healthier weight.    We discussed the importance of life-long vitamin supplementation and life-long  follow-up.    Leah was reminded that, to avoid marginal ulcers she should avoid tobacco at all, alcohol in excess, caffeine in excess, and NSAIDS (unless indicated for cardioprotection or othewise and opposed by a PPI).    Manoj Stover MD    [facility].  10/25/2023  7:39 AM  [phone] (clinic phone)  [phone] (fax)    No images are attached to the encounter.  Medical Decision Making:    Leah John is 43 year old  female who presents for a billable video visit today.    How would you like to obtain your AVS? MyChart  If dropped from the video visit, the video invitation should be resent by: Text to cell phone: [phone]  Will anyone else be joining your video visit? No      Video Start Time: 11:29 AM    Are " there any specific questions or needs that you would like addressed at your visit today? No questions or concerns today.           Video-Visit Details    Type of service:  Video Visit    Platform used for Video Visit: PhotoRocket    Video End Time (time video stopped): 11:46 AM    Originating Location (pt. Location): Home      Distant Location (provider location):  On-site    Distant Location (provider location):  [facility] BARIATRICS [facility]      30 minutes spent by me on the date of the encounter doing chart review, history and exam, documentation and further activities per the note

## 2023-11-14 ENCOUNTER — HOSPITAL ENCOUNTER (OUTPATIENT)
Dept: MAMMOGRAPHY | Facility: CLINIC | Age: 43
Discharge: HOME | End: 2023-11-14
Attending: FAMILY MEDICINE | Admitting: FAMILY MEDICINE
Payer: COMMERCIAL

## 2023-11-14 DIAGNOSIS — Z12.31 VISIT FOR SCREENING MAMMOGRAM: ICD-10-CM

## 2023-11-14 PROCEDURE — 77067 SCR MAMMO BI INCL CAD: CPT

## 2023-12-03 ENCOUNTER — HEALTH MAINTENANCE LETTER (OUTPATIENT)
Age: 43
End: 2023-12-03

## 2024-01-25 ENCOUNTER — TELEPHONE (OUTPATIENT)
Dept: SURGERY | Facility: CLINIC | Age: 44
End: 2024-01-25

## 2024-01-25 NOTE — TELEPHONE ENCOUNTER
Patient needs to be rescheduled for their virtual visit due to Reason for Reschedule: Out-of-State    Appointment mode: Video  Provider: Manoj Stover MD    Pt is currently in [place]. Pt stated she will reschedule on MyChart.

## 2024-01-29 ASSESSMENT — ENCOUNTER SYMPTOMS
COUGH: 0
FEVER: 0
ABDOMINAL PAIN: 0
HEADACHES: 0
JOINT SWELLING: 0
CONSTIPATION: 0
DIARRHEA: 0
DYSURIA: 0
HEMATURIA: 0
CHILLS: 0
NERVOUS/ANXIOUS: 0
HEMATOCHEZIA: 0
EYE PAIN: 0
WEAKNESS: 0
ARTHRALGIAS: 0
PALPITATIONS: 0
BREAST MASS: 0
FREQUENCY: 0
NAUSEA: 0
SHORTNESS OF BREATH: 0
DIZZINESS: 0
SORE THROAT: 0
MYALGIAS: 0
PARESTHESIAS: 0
HEARTBURN: 0

## 2024-01-29 ASSESSMENT — PATIENT HEALTH QUESTIONNAIRE - PHQ9
10. IF YOU CHECKED OFF ANY PROBLEMS, HOW DIFFICULT HAVE THESE PROBLEMS MADE IT FOR YOU TO DO YOUR WORK, TAKE CARE OF THINGS AT HOME, OR GET ALONG WITH OTHER PEOPLE: NOT DIFFICULT AT ALL
SUM OF ALL RESPONSES TO PHQ QUESTIONS 1-9: 0
SUM OF ALL RESPONSES TO PHQ QUESTIONS 1-9: 0

## 2024-01-30 ENCOUNTER — OFFICE VISIT (OUTPATIENT)
Dept: FAMILY MEDICINE | Facility: CLINIC | Age: 44
End: 2024-01-30
Payer: COMMERCIAL

## 2024-01-30 VITALS
HEART RATE: 57 BPM | BODY MASS INDEX: 43.99 KG/M2 | SYSTOLIC BLOOD PRESSURE: 110 MMHG | RESPIRATION RATE: 13 BRPM | HEIGHT: 61 IN | OXYGEN SATURATION: 100 % | DIASTOLIC BLOOD PRESSURE: 73 MMHG | TEMPERATURE: 97.8 F | WEIGHT: 233 LBS

## 2024-01-30 DIAGNOSIS — Z00.00 ENCOUNTER FOR ROUTINE HISTORY AND PHYSICAL EXAM IN FEMALE: Primary | ICD-10-CM

## 2024-01-30 DIAGNOSIS — E78.2 MIXED HYPERLIPIDEMIA: ICD-10-CM

## 2024-01-30 DIAGNOSIS — E66.01 MORBID OBESITY (H): ICD-10-CM

## 2024-01-30 DIAGNOSIS — F41.9 ANXIETY: ICD-10-CM

## 2024-01-30 DIAGNOSIS — N95.1 MENOPAUSAL SYNDROME (HOT FLASHES): ICD-10-CM

## 2024-01-30 DIAGNOSIS — Z98.84 S/P BARIATRIC SURGERY: ICD-10-CM

## 2024-01-30 PROCEDURE — 99396 PREV VISIT EST AGE 40-64: CPT | Performed by: NURSE PRACTITIONER

## 2024-01-30 PROCEDURE — 99214 OFFICE O/P EST MOD 30 MIN: CPT | Mod: 25 | Performed by: NURSE PRACTITIONER

## 2024-01-30 PROCEDURE — 80061 LIPID PANEL: CPT | Performed by: NURSE PRACTITIONER

## 2024-01-30 PROCEDURE — 36415 COLL VENOUS BLD VENIPUNCTURE: CPT | Performed by: NURSE PRACTITIONER

## 2024-01-30 RX ORDER — GABAPENTIN 100 MG/1
200 CAPSULE ORAL AT BEDTIME
Qty: 60 CAPSULE | Refills: 0 | Status: SHIPPED | OUTPATIENT
Start: 2024-01-30 | End: 2024-02-26

## 2024-01-30 ASSESSMENT — PAIN SCALES - GENERAL: PAINLEVEL: NO PAIN (0)

## 2024-01-30 NOTE — PROGRESS NOTES
Assessment and Plan:    Encounter for routine history and physical exam in female  Recommend consuming a healthy diet and exercising.  She declines hepatitis B vaccine.  She is up-to-date on breast cancer and cervical cancer screening.    Menopausal syndrome (hot flashes)  Discussed treatment options.  Will start gabapentin nightly.  Educated on its indications and side effects.  She is to follow-up if symptoms persist or worsen.  - gabapentin (NEURONTIN) 100 MG capsule  Dispense: 60 capsule; Refill: 0    Anxiety  This is controlled with fluoxetine.  - FLUoxetine (PROZAC) 20 MG capsule  Dispense: 90 capsule; Refill: 3    Mixed hyperlipidemia  Will notify the patient of the results.   - Lipid panel reflex to direct LDL Fasting  - Lipid panel reflex to direct LDL Fasting    Morbid obesity (H)  S/P bariatric surgery  Recommend consuming a healthy diet and exercising.  Patient is followed by the bariatric clinic.  This is contributing to hyperlipidemia.      Subjective:     Leah is a 43 year old female presenting to the clinic for a female physical.     LMP: 2017 (has IUD)   Hx of abnormal pap smear: 21 normal, positive high risk HPV; colposcopy 21 showed: FOCAL ATYPICAL SQUAMOUS METAPLASIA WITH KOILOCYTOSIS, SUSPICIOUS BUT NOT DIAGNOSTIC FOR HPV CYTOPATHIC EFFECT  NEGATIVE FOR HIGH-GRADE EPITHELIAL DYSPLASIA   Last pap smear:10/31/22 normal, negative HPV   Perform self-breast exams: none   Vaginal discharge or irritation: none   Sexually active: yes,  for 10 years   Contraception: IUD   Concerns for STDs: none   Previous pregnancies:one pregnancy (   Five year old daughter      Patient has a history of Pavithra-en-Y gastric bypass.  She sees the bariatric center and is receiving Wegovy.  She has a history of hyperlipidemia and is not currently taking medication.   She has a history of hypothyroidism and is not currently taking medication.  Last TSH was 3,84 on 8/10/23.  Parathyroid is  monitored due to hyperparathyroidism.  Parathyroid was 47 on 8/10/23.  Patient is taking fluoxetine 20 mg daily for anxiety.  This is well-controlled.  She denies thoughts of suicide.  She feels well supported.  Patient has been experiencing intermittent night sweats and hot flashes since last spring.  She admits to some mood swings. She has the IUD.     Review of systems:  I performed a 10 point review of systems.  All pertinent positives and negatives are noted in the HPI. All others are negative.     Allergies   Allergen Reactions    Nsaids      Hx of gastric bypass and should avoid due to ulcer/gastritis risks.    Sulfa Antibiotics Unknown       Current Outpatient Medications   Medication    calcium citrate (CITRACAL) 950 (200 Ca) MG tablet    ferrous fumarate 65 mg, [place]. FE,-Vitamin C 125 mg (VITRON-C)  MG TABS tablet    FLUoxetine (PROZAC) 20 MG capsule    Multiple Vitamin (MULTIVITAMIN ADULT PO)    Semaglutide-Weight Management (WEGOVY) 2.4 MG/0.75ML pen    vitamin B-12 (CYANOCOBALAMIN) 500 MCG tablet    Vitamin D (Cholecalciferol) 25 MCG (1000 UT) TABS     No current facility-administered medications for this visit.       Social History     Socioeconomic History    Marital status:      Spouse name: Not on file    Number of children: Not on file    Years of education: Not on file    Highest education level: Not on file   Occupational History    Not on file   Tobacco Use    Smoking status: Never     Passive exposure: Never    Smokeless tobacco: Never   Vaping Use    Vaping Use: Never used   Substance and Sexual Activity    Alcohol use: Yes     Alcohol/week: 2.5 standard drinks of alcohol     Types: 3 Standard drinks or equivalent per week    Drug use: No    Sexual activity: Yes     Partners: Male     Birth control/protection: I.U.D., Other   Other Topics Concern    Parent/sibling w/ CABG, MI or angioplasty before 65F 55M? No   Social History Narrative    Not on file     Social Determinants of  Health     Financial Resource Strain: Low Risk  (1/29/2024)    Financial Resource Strain     Within the past 12 months, have you or your family members you live with been unable to get utilities (heat, electricity) when it was really needed?: No   Food Insecurity: Low Risk  (1/29/2024)    Food Insecurity     Within the past 12 months, did you worry that your food would run out before you got money to buy more?: No     Within the past 12 months, did the food you bought just not last and you didn t have money to get more?: No   Transportation Needs: Low Risk  (1/29/2024)    Transportation Needs     Within the past 12 months, has lack of transportation kept you from medical appointments, getting your medicines, non-medical meetings or appointments, work, or from getting things that you need?: No   Physical Activity: Not on file   Stress: Not on file   Social Connections: Not on file   Interpersonal Safety: Low Risk  (1/30/2024)    Interpersonal Safety     Do you feel physically and emotionally safe where you currently live?: Yes     Within the past 12 months, have you been hit, slapped, kicked or otherwise physically hurt by someone?: No     Within the past 12 months, have you been humiliated or emotionally abused in other ways by your partner or ex-partner?: No   Housing Stability: Low Risk  (1/29/2024)    Housing Stability     Do you have housing? : Yes     Are you worried about losing your housing?: No       Past Medical History:   Diagnosis Date    Anisometropia     Anxiety     Cervical intraepithelial neoplasia III 2013    ALFRED III (cervical intraepithelial neoplasia III) 7/20/2012    2005, 2006 NIL paps 10/17/07 ASCUS, neg HPV 2008, 2009, 2011 NIL paps 6/25/12 ASC-H 7/20/12 Colpo bx ALFRED I, II, III, ECC neg 9/13/12 LEEP ALFRED I & III, margins neg, ECC neg 4/8/13 NIL pap, neg HPV 10/3/13 NIL pap, neg HPV 1/15/15 NIL pap, neg HPV Above per Care Everywhere 12/29/16 NIL pap, neg HPV 8/17/21 NIL pap, +HR HPV (not 16/18).  Plan: colposcopy due before 21 [place]: atypical squa    Depression     20s; on medications zoloft; prozac x 1 year    Depressive disorder     Dyslipidemia     Hyperlipidemia with target LDL less than 160 2011    Formatting of this note might be different from the original. ICD 10    Hyperparathyroidism (H24) 2019    Hypothyroidism     Iron deficiency anemia 2018    Knee pain 3/14/2011    Low vitamin B12 level 2019    Metabolic syndrome     Morbid obesity (H)     Morbid obesity with BMI of 50.0-59.9, adult (H) 3/25/2015    MTHFR mutation     heterozygous    PCOS (polycystic ovarian syndrome)     Plantar fasciitis 6/10/2022    Polycystic ovary syndrome     Pre-diabetes     Prothrombin gene mutation (H24) 2017    heterozygous    S/P bariatric surgery 7/15/2015    Strabismus     Stress fracture of right foot, initial encounter 6/10/2022    Vitamin D deficiency 2019       Family History   Problem Relation Age of Onset    Obesity Mother     Hypertension Mother     Substance Abuse Mother     Osteoporosis Mother     Depression Father     Hypertension Father     Substance Abuse Father     Depression Sister     Anxiety Disorder Sister     Depression Brother     Diabetes Brother         insulin    Depression Maternal Grandmother     Substance Abuse Maternal Grandmother     Depression Maternal Grandfather     Depression Paternal Grandmother     Osteoporosis Paternal Grandmother     Depression Paternal Grandfather     Cancer Paternal Grandfather         found late, mets, possible lung    Substance Abuse Paternal Grandfather     Depression Sister     Anesthesia Reaction No family hx of        Past Surgical History:   Procedure Laterality Date    ABDOMEN SURGERY  2015    Gastric bypass    BIOPSY CERVICAL, LOCAL EXCISION, SINGLE/MULTIPLE      ALFRED III     SECTION N/A 2018    Procedure: PRIMARY  SECTION;  Surgeon: Becky Rg MD;  Location:  [facility];  Service:     COLONOSCOPY  2010    EXCISE GANGLION WRIST Right     EYE SURGERY      x3; as a child    LEEP TX, CERVICAL  2013    DC LAP GASTRIC BYPASS/CRISTIANE-EN-Y N/A 07/15/2015    Mark Olivier MD; [employer]. Initial Weight 311 lbs, BMI 59.7    SOFT TISSUE SURGERY  2008    Ganglion cyst removal       Objective:     /73   Pulse 57   Temp 97.8  F (36.6  C)   Resp 13   Ht 1.537 m (5' 0.5\")   Wt 105.7 kg (233 lb)   SpO2 100%   Breastfeeding No   BMI 44.76 kg/m      Patient is alert, no obvious distress.   Skin: Warm, dry.  No rashes or lesions. Skin turgor rapid return.   HEENT:  Eyes normal.  Ears normal.  Nose patent, mucosa pink.  Oropharynx mucosa pink, no lesions or tonsil enlargement.   Neck:  Supple, without lymphadenopathy, bruits, JVD. Thyroid normal texture and size.    Lungs:  Clear to auscultation.  No wheezing, rales noted.  Respirations even and unlabored.   Heart:  Regular rate and rhythm.  No murmurs.   Breasts:  deferred per patient   Abdomen: Soft, nontender.  No organomegaly.  Bowel sounds normoactive.  No guarding or masses noted.   :  deferred  Musculoskeletal:  Full ROM of extremities.  Muscle strength equal +5/5.   Neurological:  Cranial nerves 2-12 intact.            Answers submitted by the patient for this visit:  Patient Health Questionnaire (Submitted on 1/29/2024)  If you checked off any problems, how difficult have these problems made it for you to do your work, take care of things at home, or get along with other people?: Not difficult at all  PHQ9 TOTAL SCORE: 0  Annual Preventive Visit (Submitted on 1/29/2024)  Chief Complaint: Annual Exam:  Frequency of exercise:: 2-3 days/week  Getting at least 3 servings of Calcium per day:: Yes  Diet:: Regular (no restrictions)  Taking medications regularly:: Yes  Medication side effects:: None  Bi-annual eye exam:: Yes  Dental care twice a year:: Yes  Sleep apnea or symptoms of sleep apnea:: None  abdominal pain: " No  Blood in stool: No  Blood in urine: No  chest pain: No  chills: No  congestion: No  constipation: No  cough: No  diarrhea: No  dizziness: No  ear pain: No  eye pain: No  nervous/anxious: No  fever: No  frequency: No  genital sores: No  headaches: No  hearing loss: No  heartburn: No  arthralgias: No  joint swelling: No  peripheral edema: No  mood changes: No  myalgias: No  nausea: No  dysuria: No  palpitations: No  Skin sensation changes: No  sore throat: No  urgency: No  rash: No  shortness of breath: No  visual disturbance: No  weakness: No  pelvic pain: No  vaginal bleeding: No  vaginal discharge: No  tenderness: No  breast mass: No  breast discharge: No  Additional concerns today:: Yes  Exercise outside of work (Submitted on 1/29/2024)  Chief Complaint: Annual Exam:  Duration of exercise:: 15-30 minutes

## 2024-01-31 LAB
CHOLEST SERPL-MCNC: 199 MG/DL
FASTING STATUS PATIENT QL REPORTED: YES
HDLC SERPL-MCNC: 64 MG/DL
LDLC SERPL CALC-MCNC: 120 MG/DL
NONHDLC SERPL-MCNC: 135 MG/DL
TRIGL SERPL-MCNC: 76 MG/DL

## 2024-02-26 DIAGNOSIS — N95.1 MENOPAUSAL SYNDROME (HOT FLASHES): ICD-10-CM

## 2024-02-26 RX ORDER — GABAPENTIN 100 MG/1
200 CAPSULE ORAL AT BEDTIME
Qty: 180 CAPSULE | Refills: 1 | Status: SHIPPED | OUTPATIENT
Start: 2024-02-26 | End: 2024-03-03

## 2024-02-26 NOTE — TELEPHONE ENCOUNTER
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    Urine Drug Screen on file:  No

## 2024-03-02 DIAGNOSIS — N95.1 MENOPAUSAL SYNDROME (HOT FLASHES): ICD-10-CM

## 2024-03-03 RX ORDER — GABAPENTIN 100 MG/1
CAPSULE ORAL
Qty: 60 CAPSULE | Refills: 0 | Status: SHIPPED | OUTPATIENT
Start: 2024-03-03 | End: 2024-04-04

## 2024-03-18 DIAGNOSIS — E66.813 CLASS 3 SEVERE OBESITY DUE TO EXCESS CALORIES WITH BODY MASS INDEX (BMI) OF 40.0 TO 44.9 IN ADULT, UNSPECIFIED WHETHER SERIOUS COMORBIDITY PRESENT (H): Primary | ICD-10-CM

## 2024-03-18 DIAGNOSIS — E66.01 CLASS 3 SEVERE OBESITY DUE TO EXCESS CALORIES WITH BODY MASS INDEX (BMI) OF 40.0 TO 44.9 IN ADULT, UNSPECIFIED WHETHER SERIOUS COMORBIDITY PRESENT (H): Primary | ICD-10-CM

## 2024-04-04 DIAGNOSIS — N95.1 MENOPAUSAL SYNDROME (HOT FLASHES): ICD-10-CM

## 2024-04-04 RX ORDER — GABAPENTIN 100 MG/1
CAPSULE ORAL
Qty: 60 CAPSULE | Refills: 3 | Status: SHIPPED | OUTPATIENT
Start: 2024-04-04

## 2024-06-05 ENCOUNTER — OFFICE VISIT (OUTPATIENT)
Dept: FAMILY MEDICINE | Facility: CLINIC | Age: 44
End: 2024-06-05
Payer: COMMERCIAL

## 2024-06-05 VITALS
DIASTOLIC BLOOD PRESSURE: 74 MMHG | RESPIRATION RATE: 16 BRPM | TEMPERATURE: 98.5 F | HEART RATE: 82 BPM | SYSTOLIC BLOOD PRESSURE: 109 MMHG | OXYGEN SATURATION: 98 %

## 2024-06-05 DIAGNOSIS — J02.9 VIRAL PHARYNGITIS: Primary | ICD-10-CM

## 2024-06-05 LAB
DEPRECATED S PYO AG THROAT QL EIA: NEGATIVE
GROUP A STREP BY PCR: NOT DETECTED

## 2024-06-05 PROCEDURE — 87651 STREP A DNA AMP PROBE: CPT | Performed by: PHYSICIAN ASSISTANT

## 2024-06-05 PROCEDURE — 99212 OFFICE O/P EST SF 10 MIN: CPT | Performed by: PHYSICIAN ASSISTANT

## 2024-06-05 NOTE — PROGRESS NOTES
Assessment & Plan:      Problem List Items Addressed This Visit    None  Visit Diagnoses       Viral pharyngitis    -  Primary    Relevant Orders    Streptococcus A Rapid Screen w/Reflex to PCR - Clinic Collect (Completed)    Group A Streptococcus PCR Throat Swab          Medical Decision Making  Patient presents with sore throat for 2 to 3 days.  Rapid strep is negative.  Symptoms appear consistent with a viral pharyngitis.  Continue with conservative measures.  Discussed treatment and symptomatic care.  Allergies and medication interactions reviewed.  Discussed signs of worsening symptoms and when to follow-up with PCP if no symptom improvement.     Subjective:      Leah John is a 43 year old female here for evaluation of sore throat.  Onset of symptoms was 2 to 3 days ago.  Associated symptoms include swelling of the lymph nodes in the neck and pain in the back of the tongue.  No fevers, cough, rhinorrhea     The following portions of the patient's history were reviewed and updated as appropriate: allergies, current medications, and problem list.     Review of Systems  Pertinent items are noted in HPI.    Allergies  Allergies   Allergen Reactions    Nsaids      Hx of gastric bypass and should avoid due to ulcer/gastritis risks.    Sulfa Antibiotics Unknown       Family History   Problem Relation Age of Onset    Obesity Mother     Hypertension Mother     Substance Abuse Mother     Osteoporosis Mother     Depression Father     Hypertension Father     Substance Abuse Father     Depression Sister     Anxiety Disorder Sister     Depression Brother     Diabetes Brother         insulin    Depression Maternal Grandmother     Substance Abuse Maternal Grandmother     Depression Maternal Grandfather     Depression Paternal Grandmother     Osteoporosis Paternal Grandmother     Depression Paternal Grandfather     Cancer Paternal Grandfather         found late, mets, possible lung    Substance Abuse Paternal  Grandfather     Depression Sister     Anesthesia Reaction No family hx of        Social History     Tobacco Use    Smoking status: Never     Passive exposure: Never    Smokeless tobacco: Never   Substance Use Topics    Alcohol use: Yes     Alcohol/week: 2.5 standard drinks of alcohol     Types: 3 Standard drinks or equivalent per week        Objective:      /74   Pulse 82   Temp 98.5  F (36.9  C) (Oral)   Resp 16   SpO2 98%   General appearance - alert, well appearing, and in no distress and non-toxic  Ears - TMs intact with minimal serous fluid, no bulging erythema  Mouth - mucous membranes moist, pharynx normal without lesions  Neck - supple, no significant adenopathy     Lab & Imaging Results    Results for orders placed or performed in visit on 06/05/24   Streptococcus A Rapid Screen w/Reflex to PCR - Clinic Collect     Status: Normal    Specimen: Throat; Swab   Result Value Ref Range    Group A Strep antigen Negative Negative       I personally reviewed these results and discussed findings with the patient.    The use of Dragon/[employer] dictation services was used to construct the content of this note; any grammatical errors are non-intentional. Please contact the author directly if you are in need of any clarification.

## 2024-07-01 ENCOUNTER — MYC REFILL (OUTPATIENT)
Dept: SURGERY | Facility: CLINIC | Age: 44
End: 2024-07-01
Payer: COMMERCIAL

## 2024-07-01 DIAGNOSIS — E66.813 CLASS 3 SEVERE OBESITY DUE TO EXCESS CALORIES WITH BODY MASS INDEX (BMI) OF 40.0 TO 44.9 IN ADULT, UNSPECIFIED WHETHER SERIOUS COMORBIDITY PRESENT (H): ICD-10-CM

## 2024-07-01 DIAGNOSIS — E66.01 CLASS 3 SEVERE OBESITY DUE TO EXCESS CALORIES WITH BODY MASS INDEX (BMI) OF 40.0 TO 44.9 IN ADULT, UNSPECIFIED WHETHER SERIOUS COMORBIDITY PRESENT (H): ICD-10-CM

## 2024-07-14 NOTE — PROGRESS NOTES
Bariatric Follow Up Visit with a History of Previous Bariatric Surgery     Date of visit: 7/14/2024  Physician: Manoj Stover MD, MD  Primary Care Provider:  Keiry Barrientos  Leah Duranjane   43 year old  female    Date of Surgery: 7/15/15  Initial Weight: 311 lbs  Initial BMI: 59.7  Today's Weight:   Wt Readings from Last 1 Encounters:   07/15/24 107.8 kg (237 lb 9.6 oz)     Weight history:   Wt Readings from Last 4 Encounters:   07/15/24 107.8 kg (237 lb 9.6 oz)   01/30/24 105.7 kg (233 lb)   10/25/23 104.3 kg (230 lb)   06/13/23 108 kg (238 lb)      Body mass index is 45.64 kg/m .      Assessment and Plan     Assessment: Leah is a 43 year old year old female who is 9 years s/p  Pavithra en Y Gastric Bypass with Dr. Olivier.  In the interim  lots of home stress with sick child who was in and out of hospital and  dealing with alcohol treatment. With her child requiring procedures/hospitalizations she felt that modest weight reduction is vastly better than usual weight gain risks such stress caused in the past. Wegovy has been helpful in this respect despite about a 2-3% total weight reduction /some weight regain from last Fall.     Bariatric labs are due.  Last year had low iron levels without anemia. She has to get to work today so plans to do them in follow up.    Wegovy use in 2023 has been tolerated well. Weight is down 9 lbs since  Feb '23.    Her weight is down 74 lbs from introductory weight, a 23.8% total body weight reduction.    Leah John feels as if she hasn't fully yet achieved the goals she hoped to accomplish through bariatric surgery and weight loss.    Encounter Diagnosis   Name Primary?    Postoperative malabsorption Yes         Current Outpatient Medications:     calcium citrate (CITRACAL) 950 (200 Ca) MG tablet, Take 1 tablet by mouth 2 times daily, Disp: , Rfl:     ferrous fumarate 65 mg, [place]. FE,-Vitamin C 125 mg (VITRON-C)  MG TABS tablet, Use twice daily, 20minutes after  meals for 30 days then reduce to once daily dosing thereafter for 2 months until gone., Disp: 120 tablet, Rfl: 0    FLUoxetine (PROZAC) 20 MG capsule, Take 1 capsule (20 mg) by mouth daily, Disp: 90 capsule, Rfl: 3    gabapentin (NEURONTIN) 100 MG capsule, TAKE TWO CAPSULES BY MOUTH DAILY AT BEDTIME, Disp: 60 capsule, Rfl: 3    Multiple Vitamin (MULTIVITAMIN ADULT PO), , Disp: , Rfl:     Semaglutide-Weight Management (WEGOVY) 2.4 MG/0.75ML pen, Inject 2.4 mg Subcutaneous once a week, Disp: 9 mL, Rfl: 0    vitamin B-12 (CYANOCOBALAMIN) 500 MCG tablet, , Disp: , Rfl:     Vitamin D (Cholecalciferol) 25 MCG (1000 UT) TABS, , Disp: , Rfl:     Plan:  Continue Wegovy for now given high stress environment, we'll look to transition if needed to Zepbound if coverage exists and weight still above 230 lbs by September.   Continue mindful approach to meals with protein first,  beverages from meals to make sure enough nutrition gets in to fuel your weight loss. Aiming for 70-80grams of lean protein daily.  Check labs at your convenience and we can check in 3-4 months to see how things are going/adjusting as needed.  No follow-ups on file. Med check in 3-4 months.    Bariatric Surgery Review     Interim History/LifeChanges:  high stress loads as noted above this past year.     Patient Concerns: no  Appetite (1-10): good on Wegovy.  GERD: no.    Reviewed whether any need/indication for screening EGD today and we will deferred.  Typically, a screening EGD is recommend post op year 2-3 if no symptoms to assess health of esophagus/bariatric surgery and sooner if difficult to control GERD or persistent pain/dysphagia sx despite behavior modification.    Medication changes: no changes.    Vitamin Intake: keeps them at work.  B-12   yes   MVI  Yes: similar to Equate.   Vitamin D  yes   Calcium   citrate     Other                LABS: ordered    Nausea no  Vomiting no  Constipation no  Diarrhea no  Rashes no  Hair Loss  "no  Reactive Hypoglycemia no  Light Headedness no   Moods stressed      Most recent labs:  Lab Results   Component Value Date    WBC 5.7 08/10/2023    HGB 11.3 (L) 08/10/2023    HCT 35.7 08/10/2023    MCV 80 08/10/2023     08/10/2023     Lab Results   Component Value Date    CHOL 199 01/30/2024     Lab Results   Component Value Date    HDL 64 01/30/2024     No components found for: \"LDLCALC\"  Lab Results   Component Value Date    TRIG 76 01/30/2024     No results found for: \"CHOLHDL\"  Lab Results   Component Value Date    ALT 20 08/10/2023    AST 31 08/10/2023    ALKPHOS 75 08/10/2023     No results found for: \"HGBA1C\"  Lab Results   Component Value Date    B12 333 08/10/2023     No components found for: \"VITDT1\"  Lab Results   Component Value Date    JAMAR 9 08/10/2023     Lab Results   Component Value Date    PTHI 47 08/10/2023     Lab Results   Component Value Date    ZN 75.6 08/10/2023     Lab Results   Component Value Date    VIB1WB 125 08/10/2023     Lab Results   Component Value Date    TSH 3.84 08/10/2023     No results found for: \"TEST\"    Habits:  Tobacco/Nicotine/THC exposure? no   NSAID use? avoids   Alcohol use? no   Caffeine Habits? Tea in the AM       Exercise Routine: walks with family. Hockey  for 7 yo daughter.   3 meals/day? yes  Protein 60-80g/day? aiming  Water Separate from meals? yes  Calorie Containing Beverages: rare  Restaurant eating/wk: n/a  Sleep Habits:  n/a  CPAP Use? no  Contraception: IUD  DEXA:n/a.  Discussed annual screening to start at age 45 and continue to age 55 if scoring \"low risk\". DEXA scan recommended at age 55 regardless as long as at least 2 years have transpired from their bariatric surgery.    Social History     Social History     Socioeconomic History    Marital status:      Spouse name: Not on file    Number of children: Not on file    Years of education: Not on file    Highest education level: Not on file   Occupational History    Not on file "   Tobacco Use    Smoking status: Never     Passive exposure: Never    Smokeless tobacco: Never   Vaping Use    Vaping status: Never Used   Substance and Sexual Activity    Alcohol use: Yes     Alcohol/week: 2.5 standard drinks of alcohol     Types: 3 Standard drinks or equivalent per week    Drug use: No    Sexual activity: Yes     Partners: Male     Birth control/protection: I.U.D., Other   Other Topics Concern    Parent/sibling w/ CABG, MI or angioplasty before 65F 55M? No   Social History Narrative    Not on file     Social Determinants of Health     Financial Resource Strain: Low Risk  (1/29/2024)    Financial Resource Strain     Within the past 12 months, have you or your family members you live with been unable to get utilities (heat, electricity) when it was really needed?: No   Food Insecurity: Low Risk  (1/29/2024)    Food Insecurity     Within the past 12 months, did you worry that your food would run out before you got money to buy more?: No     Within the past 12 months, did the food you bought just not last and you didn t have money to get more?: No   Transportation Needs: Low Risk  (1/29/2024)    Transportation Needs     Within the past 12 months, has lack of transportation kept you from medical appointments, getting your medicines, non-medical meetings or appointments, work, or from getting things that you need?: No   Physical Activity: Not on file   Stress: Not on file   Social Connections: Not on file   Interpersonal Safety: Low Risk  (1/30/2024)    Interpersonal Safety     Do you feel physically and emotionally safe where you currently live?: Yes     Within the past 12 months, have you been hit, slapped, kicked or otherwise physically hurt by someone?: No     Within the past 12 months, have you been humiliated or emotionally abused in other ways by your partner or ex-partner?: No   Housing Stability: Low Risk  (1/29/2024)    Housing Stability     Do you have housing? : Yes     Are you worried about  losing your housing?: No       Past Medical History     Past Medical History:   Diagnosis Date    Anisometropia     Anxiety     Cervical intraepithelial neoplasia III     ALFRED III (cervical intraepithelial neoplasia III) 2012, 2006 NIL paps 10/17/07 ASCUS, neg HPV , ,  NIL paps 12 ASC-H 12 Colpo bx ALFRED I, II, III, ECC neg 12 LEEP ALFRED I & III, margins neg, ECC neg 13 NIL pap, neg HPV 10/3/13 NIL pap, neg HPV 1/15/15 NIL pap, neg HPV Above per Care Everywhere 16 NIL pap, neg HPV 21 NIL pap, +HR HPV (not 16/18). Plan: colposcopy due before 21 [place]: atypical squa    Depression     20s; on medications zoloft; prozac x 1 year    Depressive disorder     Dyslipidemia     Hyperlipidemia with target LDL less than 160 2011    Formatting of this note might be different from the original. ICD 10    Hyperparathyroidism (H24) 2019    Hypothyroidism     Iron deficiency anemia 2018    Knee pain 3/14/2011    Low vitamin B12 level 2019    Metabolic syndrome     Morbid obesity (H)     Morbid obesity with BMI of 50.0-59.9, adult (H) 3/25/2015    MTHFR mutation     heterozygous    PCOS (polycystic ovarian syndrome)     Plantar fasciitis 6/10/2022    Polycystic ovary syndrome     Pre-diabetes     Prothrombin gene mutation (H24) 2017    heterozygous    S/P bariatric surgery 7/15/2015    Strabismus     Stress fracture of right foot, initial encounter 6/10/2022    Vitamin D deficiency 2019     Past Surgical History:   Procedure Laterality Date    ABDOMEN SURGERY  2015    Gastric bypass    BIOPSY CERVICAL, LOCAL EXCISION, SINGLE/MULTIPLE      ALFRED III     SECTION N/A 2018    Procedure: PRIMARY  SECTION;  Surgeon: Becky Rg MD;  Location: [facility] L+D OR;  Service:     COLONOSCOPY      EXCISE GANGLION WRIST Right     EYE SURGERY      x3; as a child    LEEP TX, CERVICAL      WI LAP GASTRIC  "BYPASS/PAVITHRA-EN-Y N/A 07/15/2015    Mark Olivier MD; [facility]. Initial Weight 311 lbs, BMI 59.7    SOFT TISSUE SURGERY      Ganglion cyst removal       Problem List     Patient Active Problem List   Diagnosis    Knee pain    Morbid obesity (H)    Anisometropia    Anxiety    Hyperlipidemia with target LDL less than 160    Hyperparathyroidism (H24)    Hypothyroidism    Low vitamin B12 level    Metabolic syndrome    MTHFR mutation    PCOS (polycystic ovarian syndrome)    Pre-diabetes    S/P bariatric surgery    Vitamin D deficiency    ALFRED III (cervical intraepithelial neoplasia III)    Stress fracture of right foot, initial encounter    Plantar fasciitis     Medications     [unfilled]  Surgical History     Past Surgical History  She has a past surgical history that includes leep tx, cervical (); Excise ganglion wrist (Right); Eye surgery; Pr Lap Gastric Bypass/Pavithra-En-Y (N/A, 07/15/2015); Biopsy cervical, local excision, single/multiple ();  Section (N/A, 2018); Abdomen surgery (2015); colonoscopy (); and Soft tissue surgery ().    Objective-Exam     Constitutional:  /72 (BP Location: Right arm, Patient Position: Left side, Cuff Size: Adult Small)   Ht 1.537 m (5' 0.5\")   Wt 107.8 kg (237 lb 9.6 oz)   BMI 45.64 kg/m    [unfilled]   General:  Pleasant and in no acute distress   Eyes:  EOMI  ENT:  Airway patent    Neck:  Respiratory: Normal respiratory effort, no cough, .  CV:    Gastrointestinal: RRR  Musculoskeletal: muscle mass WNL  Skin: color without pallor,  hair thick/red,   Psychiatric: alert and oriented X3, mood and affect normal    Counseling     We reviewed the important post op bariatric recommendations:  -eating 3 meals daily  -eating protein first, getting >60gm protein daily  -eating slowly, chewing food well  -avoiding/limiting calorie containing beverages  -drinking water 15-30 minutes before or after meals  -limiting restaurant or cafeteria " eating to twice a week or less    We discussed the importance of restorative sleep and stress management in maintaining a healthy weight.  We discussed the National Weight Control Registry healthy weight maintenance strategies and ways to optimize metabolism.  We discussed the importance of physical activity including cardiovascular and strength training in maintaining a healthier weight.    We discussed the importance of life-long vitamin supplementation and life-long  follow-up.    Leah was reminded that, to avoid marginal ulcers she should avoid tobacco at all, alcohol in excess, caffeine in excess, and NSAIDS (unless indicated for cardioprotection or othewise and opposed by a PPI).    Manoj Stover MD    [facility].  2024  3:26 PM  [phone] (clinic phone)  [phone] (fax)    No images are attached to the encounter.  Medical Decision Makin minutes spent by me on the date of the encounter doing chart review, history and exam, documentation and further activities per the note

## 2024-07-15 ENCOUNTER — OFFICE VISIT (OUTPATIENT)
Dept: SURGERY | Facility: CLINIC | Age: 44
End: 2024-07-15
Payer: COMMERCIAL

## 2024-07-15 VITALS
BODY MASS INDEX: 44.86 KG/M2 | WEIGHT: 237.6 LBS | HEIGHT: 61 IN | SYSTOLIC BLOOD PRESSURE: 106 MMHG | DIASTOLIC BLOOD PRESSURE: 72 MMHG

## 2024-07-15 DIAGNOSIS — E66.813 CLASS 3 SEVERE OBESITY DUE TO EXCESS CALORIES WITH SERIOUS COMORBIDITY AND BODY MASS INDEX (BMI) OF 45.0 TO 49.9 IN ADULT (H): ICD-10-CM

## 2024-07-15 DIAGNOSIS — K91.2 POSTOPERATIVE MALABSORPTION: Primary | ICD-10-CM

## 2024-07-15 DIAGNOSIS — Z98.84 S/P BARIATRIC SURGERY: ICD-10-CM

## 2024-07-15 DIAGNOSIS — E66.01 CLASS 3 SEVERE OBESITY DUE TO EXCESS CALORIES WITH SERIOUS COMORBIDITY AND BODY MASS INDEX (BMI) OF 45.0 TO 49.9 IN ADULT (H): ICD-10-CM

## 2024-07-15 PROCEDURE — 99213 OFFICE O/P EST LOW 20 MIN: CPT | Performed by: EMERGENCY MEDICINE

## 2024-07-15 NOTE — PATIENT INSTRUCTIONS
Plan:  Continue Wegovy for now given high stress environment, we'll look to transition if needed to Zepbound if coverage exists and weight still above 230 lbs by September.   Continue mindful approach to meals with protein first,  beverages from meals to make sure enough nutrition gets in to fuel your weight loss. Aiming for 70-80grams of lean protein daily.  Check labs at your convenience and we can check in 3-4 months to see how things are going/adjusting as needed.    LEAN PROTEIN SOURCES  Getting 20-30 grams of protein, 3 meals daily, is appropriate for most people, some need more but more than about 40 grams per meal is not useful.  General rule is drinking one ounce of water per gram of protein eaten over the course of the day:  70 grams of protein each day, drink 70 oz of water.  Protein Source Portion Calories Grams of Protein                           Nonfat, plain Greek yogurt    (10 grams sugar or less) 3/4 cup (6 oz)  12-17   Light Yogurt (10 grams sugar or less) 3/4 cup (6 oz)  6-8   Protein Shake 1 shake 110-180 15-30   Skim/1% Milk or lactose-free milk 1 cup ( 8 oz)  8   Plain or light, flavored soymilk 1 cup  7-8   Plain or light, hemp milk 1 cup 110 6   Fat Free or 1% Cottage Cheese 1/2 cup 90 15   Part skim ricotta cheese 1/2 cup 100 14   Part skim or reduced fat cheese slices 1 ounce 65-80 8     Mozzarella String Cheese 1 80 8   Canned tuna, chicken, crab or salmon  (canned in water)  1/2 cup 100 15-20   White fish (broiled, grilled, baked) 3 ounces 100 21   [place]/Tuna (broiled, grilled, baked) 3 ounces 150-180 21   Shrimp, Scallops, Lobster, Crab 3 ounces 100 21   Pork loin, Pork Tenderloin 3 ounces 150 21   Boneless, skinless chicken /turkey breast                          (broiled, grilled, baked) 3 ounces 120 21   [place], [place], [place], and Venison 3 ounces 120 21   Lean cuts of red meat and pork (sirloin,   round, tenderloin, flank, ground 93%-96%) 3 ounces  170 21   Lean or Extra Lean Ground Turkey 1/2 cup 150 20   90-95% Lean Quinn Burger 1 david 140-180 21   Low-fat casserole with lean meat 3/4 cup 200 17   Luncheon Meats                                                        (turkey, lean ham, roast beef, chicken) 3 ounces 100 21   Egg (boiled, poached, scrambled) 1 Egg 60 7   Egg Substitute 1/2 cup 70 10   Nuts (limit to 1 serving per day)  3 Tbsp. 150 7   Nut [place] (peanut, almond)  Limit to 1 serving or less daily 1 Tbsp. 90 4   Soy Burger (varies) 1  15   Garbanzo, Black, [place] Beans 1/2 cup 110 7   Refried Beans 1/2 cup 100 7   Kidney and Lima beans 1/2 cup 110 7   Tempeh 3 oz 175 18   Vegan crumbles 1/2 cup 100 14   Tofu 1/2 cup 110 14   Chili (beans and extra lean beef or turkey) 1 cup 200 23   Lentil Stew/Soup 1 cup 150 12   Black Bean Soup 1 cup 175 12           On-the-Go Breakfast Ideas  As of 2015, the latest research shows what a huge impact eating breakfast has on losing weight and feeling your best. People lose more weight when they make breakfast their biggest meal of the day compared to Dinner, but even if you cannot go to that degree, getting a breakfast that has at least 20 grams of protein and even a moderate amount of fat is ideal for maintaining good energy through the day and limits overeating in the evening hours.  The following are some quick and easy suggestions for at least getting something of substance into your body in the morning.  Enjoy!    Eating breakfast within 90 minutes of waking up is an important part of taking care of your body on a restricted calorie diet plan.  After sleeping for hours, your body is in need of fuel.  An ideal breakfast is a combination of protein, whole-grain carbohydrates, or fruit.  Here s why:    -Protein digests very slowly in the body, helping you feel more satisfied.  -Whole grains provide dietary fiber, which also digests slowly and helps keep your gut clean.  -Fruit is a great source of  vitamins, minerals, and fiber.     Each one of these breakfast combinations has between 200-300 calories and 15-20 grams of protein.  Feel free to mix and match!    Bone Broth (chicken bone broth or beef bone broth) is a great way to boost protein content. 8oz of bone broth will typically have 9-12grams of protein for 40kcal of energy.    Protein: Choose  -1/2 cup low-fat cottage cheese  -2 hard boiled eggs , or one cooked in olive oil (low/slow heat).  -1 low fat string cheese stick  -1 Tablespoon natural peanut butter  -[employer] vegetarian sausage david (found in freezer section)  -1 slice lowfat cheese  -6 oz 2% or lowfat Greek yogurt, such as Fage or Oikos.    PLUS    Whole Grains:  Choose   -1 whole wheat English muffin  -1 whole wheat dinorah, half  -1/2 Fiber One frozen muffin, thawed  -1/2 Fiber One toaster pastry  -1 whole wheat bagel thin  -1/2 cup Kashi cereal  -1 Kashi waffle (or other whole grain high-fiber waffle)  Aim for whole grain/sprouted breads with at least 3g of fiber/slice if having bread. [place] is one such brand.    OR    Fruit: Choose  -1/3 cup blueberries  -1/2 banana (or a plantain- similar to a banana, yet smaller)  -1/2 cup cantaloupe cubes  -1 small apple  -1 small orange  -1/2 cup strawberries  -handful raspberries/blackberries (each berry is about 1 calorie).    *Adapted from [employer], Fall 20    Ten Breakfasts Under 250 calories    Ideally, getting between 350-600 calories  (depending on starting height and weight)for breakfast is ideal for avoiding hunger later in the day, adjust/add to the following accordingly:    One- 250 calories, 8.5 g protein  1 slice whole-grain toast   1 Tbsp peanut butter    banana    Two- 250 calories, 8 g protein    cup nonfat/lowfat yogurt  1/3rd cup diced no-sugar peaches  1/3rd cup cereal (like Special K, Cheerios, or bran flakes)    Three- 250 calories, 25 g protein  1 egg scrambled with 1 oz skim milk    cup shredded cheddar     whole grain English muffin  1 oz [place] varma  1 tsp margarine spread    Four- 225 calories, 25 g protein  1/2 cup Kashi Go-Lean cereal    cup skim milk mixed with 1 scoop Bariatric Advantage protein powder    cup no-sugar diced pears    Five- 250 calories, 20 g protein    cup oatmeal prepared with skim milk, 1 scoop protein powder, and sugar-free maple syrup    Six- 200 calories, 5 g protein  1 whole grain waffle, toasted  1 tablespoon creamy peanut or almond butter    Seven-  250 calories, 19 g protein  Breakfast sandwich: 1 slice whole grain toast, cut in half.  Add 1 scrambled egg and one slice cheddar  cheese.    Eight-  250 calories, 15 g protein  2 eggs scrambled with 1/3 cup frozen spinach (heat before adding to eggs) and 2 tablespoons low fat cream cheese.    Nine-  150 calories, 15 g protein  2/3rd cup cottage cheese    cup cantaloupe    Ten- 200 calories, 20 g protein  Fruit smoothie made with 4 oz. nonfat Greek yogurt,   cup berries, 1 scoop protein powder, and 4 oz skim milk.    Ten Lunches Under 250 Calories    Aim for lunch to be around 300-400 calories a day when trying to lose weight and get that protein in!    One- 200 calories, 11 g protein  1/3 cup tuna salad made with light mccormick on 1 slice whole grain bread  1 small peeled apple    Two- 250 calories, 16 g protein  1/3 cup lowfat cottage cheese    cup cooked green beans    small fruit cocktail (in natural juice)    Three- 200 calories, 11 g protein    grilled cheese sandwich on whole grain bread with lowfat cheese  2/3rd cup of tomato soup    Four- 250 calories, 22 g protein  Deli wrap: 1 oz sliced turkey, 1 oz sliced ham, 1 oz sliced chicken rolled up with 1 slice low-fat cheese  1 small orange    Five- 250 calories, 28 g protein  2/3rd cup chili with 1 oz shredded cheese  4 saltine crackers    Six- 250 calories, 22 g protein  1 cup fresh spinach with 2 oz chicken, 1/3rd cup mandarin oranges, and 2 tablespoons sliced almonds with 1 tablespoon   vinaigrette dressing    Seven- 200 calories, 11 g protein  1 Tbsp sugar-free preserves and 1 Tbsp peanut butter on 1 slice whole grain toast    cup nonfat/lowfat Greek yogurt    Eight- 250 calories, 18 g protein  1 small soft-shell chicken taco with 1 oz shredded cheese, lettuce, tomato, salsa, and 1 Tbsp light sour cream    cup black beans    Nine- 225 calories, 13 g protein  2 ounces baked chicken  1/4 cup mashed potatoes    cup green beans    Ten- 200 calories, 21 g protein  Deli dinorah: 2 oz roast beef or other deli meat with 1 tsp Javi mayonnaise and sliced tomato, onion, and lettuce  1/3rd cup cottage cheese      Ten Dinners Under 300 calories    If you're eating a large breakfast and medium lunch, keep dinner small.  300-400 calories is ideal for most people depending on their caloric needs.    One- 300 calories, 12 g protein  1-inch thick slice of turkey meatloaf    cup baked butternut squash    Two- 200 calories, 9 g protein  Bread-less BLT: 3 slices turkey varma, sliced tomato, wrapped in a large lettuce leaf    cup peeled fruit    Three- 275 calories, 36 g protein  3 oz roasted chicken    cup cooked broccoli    cup shredded cheddar cheese    cup unsweetened applesauce    Four- 200 calories, 25 g protein  3 oz baked tilapia  1/3rd cup cooked carrots    cup yogurt    Five- 250 calories, 20 g protein  Grilled ham  n  Swiss: spread 2 tsp ghee or butter on 1 slice of whole grain bread.  Cut bread in half, layer 2 oz deli ham with 1 piece of Swiss cheese and grill until cheese is melted.    cup cooked vegetables    Six- 250 calories, 18 g protein  Vegetarian cheeseburger: 1 [place] cheeseburger topped with lettuce, onion, tomato, and ketchup/mustard    cup sweet potato fries    Seven- 250 calories, 18 g protein  Pork pot roast: 2 oz roasted pork loin, 1/3rd cup roasted carrots,   medium potato, cooked with   cup gravy    Eight- 330 calories, 25 g protein  2 oz meatballs (about 2 small meatballs)    cup spaghetti  sauce  1/2 piece toast topped with 1 tsp ghee or butterand topped with garlic powder, toasted in oven    Nine- 250 calories, 16 g protein  Mexican pizza: one 8  corn tortilla topped with 2 oz chicken,   cup salsa, 2 tablespoons black beans, 2 tablespoons shredded cheese.  Bake until cheese is melted.    Ten- 250 calories, 22 g protein  Shrimp stir-reid: 3 oz cooked shrimp, 1/6th onion,   pepper,   cup chopped carrots sautéed in 1 tablespoon olive oil, topped with 2 tablespoons stir reid sauce and a pinch of sesame seeds        150 Calories or Less Snack Ideas   1 hardboiled egg with   cup berries  1 small apple with 1 hardboiled egg  10 almonds with   cup berries  2 clementines with 1 light string cheese  1 light string cheese with   sliced apple  1 light string cheese wrapped in 2 slices of turkey  4 100% whole wheat crackers (e.g. Triscuit) with 1 light string cheese    c. cottage cheese with   cup fruit and 1 Tbsp sunflower seeds     cup cottage cheese with   of an avocado     can tuna fish with 1 cup sliced cucumbers     cup roasted garbanzo beans with paprika and cayenne pepper    baked sweet potato with   cup chili beans or   cup cottage cheese  2 oz. nitrate free turkey slices with 1 cup carrots  1 container (6 oz) of low sugar (less than 10 grams of sugar) greek yogurt   3 Tablespoons of hummus with 1 cup sliced bell peppers   2 Tablespoons of hummus with 15 baby carrots  4 Tablespoons ranch dip made with plain Greek Yogurt and 3 mini cucumbers  1/4 cup nuts (any kind)  1 Tablespoon peanut butter with 1 stalk celery   1 dill pickle wrapped in 1-2 slices of deli ham with 1 tsp of mayonnaise/mustard.      [facility]  Nutritional Guidelines  Gastric Bypass 18 Months Post Op and Beyond    General Guidelines and Helpful Hints:  Eat 3 meals per day + protein supplement(s). No snacks between meals.  Do not skip meals.  This can cause overeating at the next meal and will prevent adequate protein and  nutritional intake.  Aim for 60-80 grams of protein per day.  Always eat your protein first. This assists with optimal nutrition and helps you stay full longer.  Depending on your portion size, you may need to drink approved protein supplement between meals to achieve protein goals. Follow recommendations of your Dietitian.   Eat your protein first, and then follow with fiber.   It is not necessary to count your fiber, but 15-20 grams per day is recommended.    Add fiber by including fruits, vegetables, whole grains, and beans.   Portions should remain about 1 cup per meal. Use measuring cups to be accurate.  Continue to use saucer/salad plates, infant/toddler silverware to keep portion sizes small and take small bites.  Eat S-L-O-W-L-Y to make each meal last 20-30 minutes. Always stop eating when satisfied.  Continue to use caution with foods containing skins, peels or membranes. Chew well!  Aim for 64 oz. of calorie-free fluids daily.  Continue to avoid caffeine and carbonation. If you choose to drink alcohol, do so in moderation.   Remember to avoid drinking during meals, 15-30 minutes before and 30 minutes after.  Exercise is benavidez for continued weight loss and weight maintenance. 150 minutes weekly of moderate aerobic activity or 75 minutes of vigorous with 2 days or more a week of strength training. Try to get 20% or more of your steps each day at a brisk pace, as though hurrying to a bus stop. Look to get stronger this year.  If having trouble tolerating meat, try using a crock-pot, tinfoil tent, steamer or other moist cooking method to create tender meats. Add broth or low-fat gravy to help meat stay moist.   Avoid high sugar and high fat foods to prevent dumping syndrome.  Check nutrition labels for less than 10 grams of sugar and less than 10 grams of fat per serving.  Continue Taking Vitamins/Minerals:  1000 mcg of Sublingual B-12 at least 3 days weekly to average 350-500mcg/day. If using 2500mcg lozenges,  "2 weekly.  If 5000 mcg, once weekly dosing works.  1 Complete Multivitamin with 18mg Iron twice daily (chewable or swallow tabs). Often sold as \"women's one a day\" if tablet but take twice daily.  500-600 mg Calcium Citrate twice daily (chewable or swallow tabs).  5000 IU Vitamin D3 daily.  If menstruating, you may need closer to 60mg of iron daily to prevent iron deficiency. An occasional \"boost\" of extra iron supplement during/after is reasonable if heavy flow.    Sample Grocery List    Protein:  Fat free Greek or light yogurt (less than 10 grams sugar)  Fat free or low-fat cottage cheese  String cheese or reduced fat cheese slices  Tuna, salmon, crab, egg, or chicken salad made with light or fat free mayonnaise  Egg or Egg Substitute  Lean/extra lean turkey, beef, bison, venison (ground, sirloin, round, flank)  Pork loin or tenderloin (grilled, baked, broiled)  Fish such as salmon, tuna, trout, tilapia, etc. (grilled, baked, broiled)  Tender cuts of lean (skinless) turkey or chicken  Lean deli meats: turkey, lean ham, chicken, lean roast beef  Beans such as kidney, garbanzo, black, mendez, or low-fat/fat free refried beans  Peanut butter (natural preferred). Limit to 1 Tbsp. per day.  Low-fat meatloaf (made with lean ground beef or turkey)  Sloppy Joes made with low-sugar ketchup and lean ground beef or turkey  Soy or vegetable protein (i.e. vegan crumbles, soy/veggie burger, tofu)  Hummus    Vegetables:  Fresh: cooked or raw (as tolerated)  Frozen vegetables  Canned vegetables (low sodium or no salt added, rinse before cooking/eating)  (Ok to have skins/peels/membranes/seeds - just chew well)    Fruits:  Fresh fruit  Frozen fruit (no sugar added)  Canned fruit (packed in its own juice, NOT syrup)  (Ok to have skins/peels/membranes/seeds - just chew well)    Starch:  Unsweetened whole-grain hot cereal (or high fiber cold cereal, dry)  Toasted whole wheat bread or [place] Thins  Whole grain crackers  Baked " /boiled/mashed potato/sweet potato  Cooked whole grain pasta, brown rice, or other cooked whole grains  Starchy vegetables: corn, peas, winter squash    Protein Supplement:   Ready to drink protein shake with:  15-30 grams protein per serving  Less than 10 grams total carbohydrate per serving   Protein powder mixed with:   Skim or 1% milk  Low fat or fat free Lactaid milk, plain or no sugar added soymilk  Water     Fats: (use in moderation)  1 teaspoon of soft tub margarine  1 teaspoon olive oil, canola oil, or peanut oil  1 tablespoon of low-fat mccormick or salad dressing     Sample Menu for 18+ months after Gastric Bypass    You do NOT need to eat/drink the full portion sizes listed below  Always stop when you are satisfied    Breakfast   cup 1% cottage cheese     cup mixed berries   Lunch 2 oz lean roast beef on   [place] Thin with 1 tsp. light mccormick    small tomato, chopped, mixed with 1 tsp. light vinaigrette dressing   Supplement Approved protein supplement (if needed between meals)   Dinner 2 oz grilled salmon    cup salad greens with 1 tsp. light salad dressing and 1 tsp. ground flax seed    cup quinoa or brown rice     Breakfast   cup egg substitute with   cup sautéed chopped vegetables  2 light [place] Krisp crackers   Lunch Tuna Melt:   cup tuna mixed with 1 tsp. light mccormick over   [place] Thin. Top with 2-3 slices cucumber and 1 oz slice of low fat cheese   Supplement 1 cup skim milk (if needed between meals)   Dinner 3 oz  grilled, broiled, or baked seasoned skinless chicken breast    cup asparagus     Breakfast   cup plain oatmeal made with skim or 1% milk with 1 Tbsp. flavored/unflavored protein powder added  1 mozzarella string cheese   Lunch 2 oz deli turkey breast  1/3 cup salad with 1 tsp. light salad dressing, 1/8 of a whole avocado and 1 Tbsp. sunflower seeds   Dinner 3 oz. pork loin made in a crock pot, seasoned with a spice rub    cup cooked carrots   Supplement Approved protein supplement (if needed  between meals)     Breakfast 1 cup breakfast casserole made with egg substitute, turkey sausage,  and steamed, chopped bell peppers   Supplement  1 cup light Greek yogurt (if needed between meals)   Lunch 2 oz. teriyaki turkey    cup mashed sweet potato with 1-2 spritzes of spray butter    cup fresh pineapple   Dinner 3 oz low fat meatloaf    cup roasted garlic zucchini     Breakfast   cup leftover breakfast casserole    cup no sugar added applesauce with 1 Tbsp. unflavored protein powder and a sprinkle of cinnamon    Lunch 3 oz shrimp with 1-2 Tbsp. low-sugar cocktail sauce for dipping    c. whole wheat pasta drizzled with   tsp. olive oil   Supplement 1 cup skim/1% milk with scoop of protein powder (if needed between meals)   Dinner Grilled, seasoned kebob with 2 oz lean beef and   cup vegetables     Breakfast Breakfast pizza:   Axis Thin spread with 1 Tbsp. low sugar spaghetti sauce,   cup shredded low fat cheese, melted and 1 slice of Salvadorean varma     cup fresh fruit mixed with chopped almonds   Lunch   cup black bean soup  4-5 whole grain crackers   Dinner 3 oz  tilapia with lemon pepper seasoning    cup stewed tomatoes   Supplement 1 string cheese (if needed between meals)     Breakfast 2 hard boiled eggs (discard 1 egg yolk)    whole wheat English Muffin with 1 tsp. low sugar jelly   Lunch   cup leftover black bean soup topped with 1-2 Tbsp. low fat cheese  2-3 light [place] Krisp crackers   Supplement Approved protein supplement (if needed between meals)   Dinner 3 oz sirloin steak    cup steamed broccoli        Wegovy (semaglutide) is a very effective satiety boosting appetite suppressant. It needs to be ramped up slowly to be tolerated adequately.  About 1/10 people will not tolerate this medication. Each month, you move up to a higher dose until eventually reaching the 2.4mg/week dose over 5 months, if tolerating the ramping process well. When in plentiful supply, one try at re-ramping is recommended if  the initial ramping has too many side effects/isn't tolerated well and if that attempt at  re-ramping isn't tolerated well, it's best to find an alternative.       Wegovy starts at 0.25mg/week for 4 weeks then increases to 0.5mg/week for 4 weeks then 1mg/week for 4 weeks then 1.7mg/week for 4 weeksthen 2.4mg/week usually for 6-9 months if tolerated well.  Injections can be given after cleansing the skin with alcohol prep pad or swab (available OTC).  Warming to room temperature 20-60 minutes prior to injection by placing on a table/counter reduces potential irritation at the time of injection.    Stop Wegovy if severe abdominal pain/vomiting/rash/throat swelling or constant nausea that prevents adequate food/water intake. Stop 2-3 weeks prior to any planned general anesthesia surgeries to reduce risk for something called a post operative ileus. If unexpected illness/injury that requires surgery, plan to go off Wegovy until fully recovered.    Gallstones can occur in about 1% of patients on this medication so update me if increase right upper abdominal pain after eating.     Start meals with protein first, separate beverages from meals by 20 minutes and work hard in between meals to get your 64-75 oz of water daily to reduce risks for severe constipation. Consider a fiber supplement like powdered psyllium husk in 12 oz water each night.    For Prevention and Treatment of Constipation when on Semaglutide     From least aggressive to most aggressive:     Move: [place] is essential - the more we move, the more our bowels move  Water: Drink water - 64oz-80oz per day suits most people well. About an one ounce of water per gram of protein eaten.  Go when you need to go. Don't wait. The longer you wait, the harder it gets.  Fiber: Fruit, raw veggies, nuts, whole grains, prune each night, flax/kevon seeds added into meals can all be helpful.   Stool Softeners: if constipation is mild and for maintenance  Gentle laxatives:  Miralax, senokot, dulcolax , Smooth move tea as needed     More aggressive (and typically won't get to this point)  Milk of Magnesia to empty out. Don't go anywhere far from a toilet for 6 hours.  Mag Citrate (what you drink before a colonoscopy).   Suppositories  Enema      Check out [employer] for patient resources.      If you have weekends off, I recommend dosing Thursday or Friday evenings for best control over weekends and ability to ride out some transient side effects should they occur.    Some people starve on this medication if not mindful about food intake. I recommend starting meals with the protein part of your meal first, chew thoroughly and separate beverages from meals by about 20 minutes to make sure you get your nourishment in first. Include vegetables/complex carbohydrates and unsaturated fat as part of your balanced diet but group these at the end of the meal, after protein is mostly gone. Satiety will kick in too early if drinking too much with meals and under nourishment can result.  If under nourished, more muscle mass losses and metabolic slowing will result and work against us in the long run.    It's not a bad idea to take a complete multivitamin most days of the week if using this medication. Low Iron formulas may be less constipating.    Pancreatitis is a very rare but potentially serious side effect. Stop Wegovy if severe mid abdominal pain/burning in nature or if unable to eat/drink due to severe nausea/discomfort.   People with strong history of pancreatitis without clear cause should stay clear of this medication as should those with strong personal or family history for medullary thyroid cancer or Multiple Endocrine Neoplasia (rare).     Kind Regards,  Manoj Stover MD  [facility]

## 2024-07-15 NOTE — LETTER
7/15/2024      Leah John  [address] [place] [address]      Dear Colleague,    Thank you for referring your patient, Leah John, to the [facility] [place]. Please see a copy of my visit note below.    Bariatric Follow Up Visit with a History of Previous Bariatric Surgery     Date of visit: 7/14/2024  Physician: Manoj Stover MD, MD  Primary Care Provider:  Keiry Barrientos  Leah Duranjane   43 year old  female    Date of Surgery: 7/15/15  Initial Weight: 311 lbs  Initial BMI: 59.7  Today's Weight:   Wt Readings from Last 1 Encounters:   07/15/24 107.8 kg (237 lb 9.6 oz)     Weight history:   Wt Readings from Last 4 Encounters:   07/15/24 107.8 kg (237 lb 9.6 oz)   01/30/24 105.7 kg (233 lb)   10/25/23 104.3 kg (230 lb)   06/13/23 108 kg (238 lb)      Body mass index is 45.64 kg/m .      Assessment and Plan     Assessment: Leah is a 43 year old year old female who is 9 years s/p  Pavithra en Y Gastric Bypass with Dr. Olivier.  In the interim  lots of home stress with sick child who was in and out of hospital and  dealing with alcohol treatment. With her child requiring procedures/hospitalizations she felt that modest weight reduction is vastly better than usual weight gain risks such stress caused in the past. Wegovy has been helpful in this respect despite about a 2-3% total weight reduction /some weight regain from last Fall.     Bariatric labs are due.  Last year had low iron levels without anemia. She has to get to work today so plans to do them in follow up.    Wegovy use in 2023 has been tolerated well. Weight is down 9 lbs since  Feb '23.    Her weight is down 74 lbs from introductory weight, a 23.8% total body weight reduction.    Leah John feels as if she hasn't fully yet achieved the goals she hoped to accomplish through bariatric surgery and weight loss.    Encounter Diagnosis   Name Primary?     Postoperative  malabsorption Yes         Current Outpatient Medications:      calcium citrate (CITRACAL) 950 (200 Ca) MG tablet, Take 1 tablet by mouth 2 times daily, Disp: , Rfl:      ferrous fumarate 65 mg, [place]. FE,-Vitamin C 125 mg (VITRON-C)  MG TABS tablet, Use twice daily, 20minutes after meals for 30 days then reduce to once daily dosing thereafter for 2 months until gone., Disp: 120 tablet, Rfl: 0     FLUoxetine (PROZAC) 20 MG capsule, Take 1 capsule (20 mg) by mouth daily, Disp: 90 capsule, Rfl: 3     gabapentin (NEURONTIN) 100 MG capsule, TAKE TWO CAPSULES BY MOUTH DAILY AT BEDTIME, Disp: 60 capsule, Rfl: 3     Multiple Vitamin (MULTIVITAMIN ADULT PO), , Disp: , Rfl:      Semaglutide-Weight Management (WEGOVY) 2.4 MG/0.75ML pen, Inject 2.4 mg Subcutaneous once a week, Disp: 9 mL, Rfl: 0     vitamin B-12 (CYANOCOBALAMIN) 500 MCG tablet, , Disp: , Rfl:      Vitamin D (Cholecalciferol) 25 MCG (1000 UT) TABS, , Disp: , Rfl:     Plan:  Continue Wegovy for now given high stress environment, we'll look to transition if needed to Zepbound if coverage exists and weight still above 230 lbs by September.   Continue mindful approach to meals with protein first,  beverages from meals to make sure enough nutrition gets in to fuel your weight loss. Aiming for 70-80grams of lean protein daily.  Check labs at your convenience and we can check in 3-4 months to see how things are going/adjusting as needed.  No follow-ups on file. Med check in 3-4 months.    Bariatric Surgery Review     Interim History/LifeChanges:  high stress loads as noted above this past year.     Patient Concerns: no  Appetite (1-10): good on Wegovy.  GERD: no.    Reviewed whether any need/indication for screening EGD today and we will deferred.  Typically, a screening EGD is recommend post op year 2-3 if no symptoms to assess health of esophagus/bariatric surgery and sooner if difficult to control GERD or persistent pain/dysphagia sx despite behavior  "modification.    Medication changes: no changes.    Vitamin Intake: keeps them at work.  B-12   yes   MVI  Yes: similar to Equate.   Vitamin D  yes   Calcium   citrate     Other                LABS: ordered    Nausea no  Vomiting no  Constipation no  Diarrhea no  Rashes no  Hair Loss no  Reactive Hypoglycemia no  Light Headedness no   Moods stressed      Most recent labs:  Lab Results   Component Value Date    WBC 5.7 08/10/2023    HGB 11.3 (L) 08/10/2023    HCT 35.7 08/10/2023    MCV 80 08/10/2023     08/10/2023     Lab Results   Component Value Date    CHOL 199 01/30/2024     Lab Results   Component Value Date    HDL 64 01/30/2024     No components found for: \"LDLCALC\"  Lab Results   Component Value Date    TRIG 76 01/30/2024     No results found for: \"CHOLHDL\"  Lab Results   Component Value Date    ALT 20 08/10/2023    AST 31 08/10/2023    ALKPHOS 75 08/10/2023     No results found for: \"HGBA1C\"  Lab Results   Component Value Date    B12 333 08/10/2023     No components found for: \"VITDT1\"  Lab Results   Component Value Date    JAMAR 9 08/10/2023     Lab Results   Component Value Date    PTHI 47 08/10/2023     Lab Results   Component Value Date    ZN 75.6 08/10/2023     Lab Results   Component Value Date    VIB1WB 125 08/10/2023     Lab Results   Component Value Date    TSH 3.84 08/10/2023     No results found for: \"TEST\"    Habits:  Tobacco/Nicotine/THC exposure? no   NSAID use? avoids   Alcohol use? no   Caffeine Habits? Tea in the AM       Exercise Routine: walks with family. Hockey  for 5 yo daughter.   3 meals/day? yes  Protein 60-80g/day? aiming  Water Separate from meals? yes  Calorie Containing Beverages: rare  Restaurant eating/wk: n/a  Sleep Habits:  n/a  CPAP Use? no  Contraception: IUD  DEXA:n/a.  Discussed annual screening to start at age 45 and continue to age 55 if scoring \"low risk\". DEXA scan recommended at age 55 regardless as long as at least 2 years have transpired from their " bariatric surgery.    Social History     Social History     Socioeconomic History     Marital status:      Spouse name: Not on file     Number of children: Not on file     Years of education: Not on file     Highest education level: Not on file   Occupational History     Not on file   Tobacco Use     Smoking status: Never     Passive exposure: Never     Smokeless tobacco: Never   Vaping Use     Vaping status: Never Used   Substance and Sexual Activity     Alcohol use: Yes     Alcohol/week: 2.5 standard drinks of alcohol     Types: 3 Standard drinks or equivalent per week     Drug use: No     Sexual activity: Yes     Partners: Male     Birth control/protection: I.U.D., Other   Other Topics Concern     Parent/sibling w/ CABG, MI or angioplasty before 65F 55M? No   Social History Narrative     Not on file     Social Determinants of Health     Financial Resource Strain: Low Risk  (1/29/2024)    Financial Resource Strain      Within the past 12 months, have you or your family members you live with been unable to get utilities (heat, electricity) when it was really needed?: No   Food Insecurity: Low Risk  (1/29/2024)    Food Insecurity      Within the past 12 months, did you worry that your food would run out before you got money to buy more?: No      Within the past 12 months, did the food you bought just not last and you didn t have money to get more?: No   Transportation Needs: Low Risk  (1/29/2024)    Transportation Needs      Within the past 12 months, has lack of transportation kept you from medical appointments, getting your medicines, non-medical meetings or appointments, work, or from getting things that you need?: No   Physical Activity: Not on file   Stress: Not on file   Social Connections: Not on file   Interpersonal Safety: Low Risk  (1/30/2024)    Interpersonal Safety      Do you feel physically and emotionally safe where you currently live?: Yes      Within the past 12 months, have you been hit,  slapped, kicked or otherwise physically hurt by someone?: No      Within the past 12 months, have you been humiliated or emotionally abused in other ways by your partner or ex-partner?: No   Housing Stability: Low Risk  (1/29/2024)    Housing Stability      Do you have housing? : Yes      Are you worried about losing your housing?: No       Past Medical History     Past Medical History:   Diagnosis Date     Anisometropia      Anxiety      Cervical intraepithelial neoplasia III 2013     ALFRED III (cervical intraepithelial neoplasia III) 7/20/2012 2005, 2006 NIL paps 10/17/07 ASCUS, neg HPV 2008, 2009, 2011 NIL paps 6/25/12 ASC-H 7/20/12 Colpo bx ALFRED I, II, III, ECC neg 9/13/12 LEEP ALFRED I & III, margins neg, ECC neg 4/8/13 NIL pap, neg HPV 10/3/13 NIL pap, neg HPV 1/15/15 NIL pap, neg HPV Above per Care Everywhere 12/29/16 NIL pap, neg HPV 8/17/21 NIL pap, +HR HPV (not 16/18). Plan: colposcopy due before 11/17/21 9/29/21 [place]: atypical squa     Depression     20s; on medications zoloft; prozac x 1 year     Depressive disorder      Dyslipidemia 2014     Hyperlipidemia with target LDL less than 160 1/11/2011    Formatting of this note might be different from the original. ICD 10     Hyperparathyroidism (H24) 1/24/2019     Hypothyroidism      Iron deficiency anemia 1/20/2018     Knee pain 3/14/2011     Low vitamin B12 level 1/24/2019     Metabolic syndrome      Morbid obesity (H)      Morbid obesity with BMI of 50.0-59.9, adult (H) 3/25/2015     MTHFR mutation     heterozygous     PCOS (polycystic ovarian syndrome)      Plantar fasciitis 6/10/2022     Polycystic ovary syndrome      Pre-diabetes      Prothrombin gene mutation (H24) 01/17/2017    heterozygous     S/P bariatric surgery 7/15/2015     Strabismus      Stress fracture of right foot, initial encounter 6/10/2022     Vitamin D deficiency 1/24/2019     Past Surgical History:   Procedure Laterality Date     ABDOMEN SURGERY  July 2015    Gastric bypass     BIOPSY  "CERVICAL, LOCAL EXCISION, SINGLE/MULTIPLE      ALFRED III      SECTION N/A 2018    Procedure: PRIMARY  SECTION;  Surgeon: Becky Rg MD;  Location: [facility];  Service:      COLONOSCOPY       EXCISE GANGLION WRIST Right      EYE SURGERY      x3; as a child     LEEP TX, CERVICAL       CA LAP GASTRIC BYPASS/CRISTIANE-EN-Y N/A 07/15/2015    Mark Olivier MD; [facility] Initial Weight 311 lbs, BMI 59.7     SOFT TISSUE SURGERY      Ganglion cyst removal       Problem List     Patient Active Problem List   Diagnosis     Knee pain     Morbid obesity (H)     Anisometropia     Anxiety     Hyperlipidemia with target LDL less than 160     Hyperparathyroidism (H24)     Hypothyroidism     Low vitamin B12 level     Metabolic syndrome     MTHFR mutation     PCOS (polycystic ovarian syndrome)     Pre-diabetes     S/P bariatric surgery     Vitamin D deficiency     ALFRED III (cervical intraepithelial neoplasia III)     Stress fracture of right foot, initial encounter     Plantar fasciitis     Medications     [unfilled]  Surgical History     Past Surgical History  She has a past surgical history that includes leep tx, cervical (); Excise ganglion wrist (Right); Eye surgery; Pr Lap Gastric Bypass/Cristiane-En-Y (N/A, 07/15/2015); Biopsy cervical, local excision, single/multiple ();  Section (N/A, 2018); Abdomen surgery (2015); colonoscopy (); and Soft tissue surgery ().    Objective-Exam     Constitutional:  /72 (BP Location: Right arm, Patient Position: Left side, Cuff Size: Adult Small)   Ht 1.537 m (5' 0.5\")   Wt 107.8 kg (237 lb 9.6 oz)   BMI 45.64 kg/m    [unfilled]   General:  Pleasant and in no acute distress   Eyes:  EOMI  ENT:  Airway patent    Neck:  Respiratory: Normal respiratory effort, no cough, .  CV:    Gastrointestinal: RRR  Musculoskeletal: muscle mass WNL  Skin: color without pallor,  hair thick/red,   Psychiatric: alert and " oriented X3, mood and affect normal    Counseling     We reviewed the important post op bariatric recommendations:  -eating 3 meals daily  -eating protein first, getting >60gm protein daily  -eating slowly, chewing food well  -avoiding/limiting calorie containing beverages  -drinking water 15-30 minutes before or after meals  -limiting restaurant or cafeteria eating to twice a week or less    We discussed the importance of restorative sleep and stress management in maintaining a healthy weight.  We discussed the National Weight Control Registry healthy weight maintenance strategies and ways to optimize metabolism.  We discussed the importance of physical activity including cardiovascular and strength training in maintaining a healthier weight.    We discussed the importance of life-long vitamin supplementation and life-long  follow-up.    Leah was reminded that, to avoid marginal ulcers she should avoid tobacco at all, alcohol in excess, caffeine in excess, and NSAIDS (unless indicated for cardioprotection or othewise and opposed by a PPI).    Manoj Stover MD    [facility].  2024  3:26 PM  [phone] (clinic phone)  [phone] (fax)    No images are attached to the encounter.  Medical Decision Makin minutes spent by me on the date of the encounter doing chart review, history and exam, documentation and further activities per the note      Again, thank you for allowing me to participate in the care of your patient.        Sincerely,        Manoj Stover MD

## 2024-10-14 ENCOUNTER — LAB (OUTPATIENT)
Dept: LAB | Facility: CLINIC | Age: 44
End: 2024-10-14
Payer: COMMERCIAL

## 2024-10-14 DIAGNOSIS — K91.2 POSTOPERATIVE MALABSORPTION: ICD-10-CM

## 2024-10-14 LAB
ALBUMIN SERPL BCG-MCNC: 3.8 G/DL (ref 3.5–5.2)
ALP SERPL-CCNC: 96 U/L (ref 40–150)
ALT SERPL W P-5'-P-CCNC: 20 U/L (ref 0–50)
ANION GAP SERPL CALCULATED.3IONS-SCNC: 8 MMOL/L (ref 7–15)
AST SERPL W P-5'-P-CCNC: 27 U/L (ref 0–45)
BILIRUB SERPL-MCNC: 0.3 MG/DL
BUN SERPL-MCNC: 13.5 MG/DL (ref 6–20)
CALCIUM SERPL-MCNC: 8.7 MG/DL (ref 8.8–10.4)
CHLORIDE SERPL-SCNC: 105 MMOL/L (ref 98–107)
CREAT SERPL-MCNC: 0.82 MG/DL (ref 0.51–0.95)
EGFRCR SERPLBLD CKD-EPI 2021: >90 ML/MIN/1.73M2
ERYTHROCYTE [DISTWIDTH] IN BLOOD BY AUTOMATED COUNT: 14.5 % (ref 10–15)
FERRITIN SERPL-MCNC: 8 NG/ML (ref 6–175)
FOLATE SERPL-MCNC: 9.1 NG/ML (ref 4.6–34.8)
GLUCOSE SERPL-MCNC: 85 MG/DL (ref 70–99)
HCO3 SERPL-SCNC: 23 MMOL/L (ref 22–29)
HCT VFR BLD AUTO: 32.2 % (ref 35–47)
HGB BLD-MCNC: 10.2 G/DL (ref 11.7–15.7)
MCH RBC QN AUTO: 24.6 PG (ref 26.5–33)
MCHC RBC AUTO-ENTMCNC: 31.7 G/DL (ref 31.5–36.5)
MCV RBC AUTO: 78 FL (ref 78–100)
PLATELET # BLD AUTO: 339 10E3/UL (ref 150–450)
POTASSIUM SERPL-SCNC: 4.2 MMOL/L (ref 3.4–5.3)
PROT SERPL-MCNC: 6.7 G/DL (ref 6.4–8.3)
RBC # BLD AUTO: 4.15 10E6/UL (ref 3.8–5.2)
SODIUM SERPL-SCNC: 136 MMOL/L (ref 135–145)
TSH SERPL DL<=0.005 MIU/L-ACNC: 5.26 UIU/ML (ref 0.3–4.2)
VIT B12 SERPL-MCNC: 368 PG/ML (ref 232–1245)
VIT D+METAB SERPL-MCNC: 29 NG/ML (ref 20–50)
WBC # BLD AUTO: 5.9 10E3/UL (ref 4–11)

## 2024-10-14 PROCEDURE — 82306 VITAMIN D 25 HYDROXY: CPT

## 2024-10-14 PROCEDURE — 36415 COLL VENOUS BLD VENIPUNCTURE: CPT

## 2024-10-14 PROCEDURE — 82728 ASSAY OF FERRITIN: CPT

## 2024-10-14 PROCEDURE — 84443 ASSAY THYROID STIM HORMONE: CPT

## 2024-10-14 PROCEDURE — 85027 COMPLETE CBC AUTOMATED: CPT

## 2024-10-14 PROCEDURE — 84590 ASSAY OF VITAMIN A: CPT | Mod: 90

## 2024-10-14 PROCEDURE — 84425 ASSAY OF VITAMIN B-1: CPT | Mod: 90

## 2024-10-14 PROCEDURE — 82607 VITAMIN B-12: CPT

## 2024-10-14 PROCEDURE — 82746 ASSAY OF FOLIC ACID SERUM: CPT

## 2024-10-14 PROCEDURE — 84630 ASSAY OF ZINC: CPT | Mod: 90

## 2024-10-14 PROCEDURE — 99000 SPECIMEN HANDLING OFFICE-LAB: CPT

## 2024-10-14 PROCEDURE — 80053 COMPREHEN METABOLIC PANEL: CPT

## 2024-10-16 LAB — ZINC SERPL-MCNC: 82.7 UG/DL

## 2024-10-17 LAB
ANNOTATION COMMENT IMP: NORMAL
RETINYL PALMITATE SERPL-MCNC: <0.02 MG/L
VIT A SERPL-MCNC: 0.46 MG/L
VIT B1 PYROPHOSHATE BLD-SCNC: 111 NMOL/L

## 2024-10-18 DIAGNOSIS — D50.8 OTHER IRON DEFICIENCY ANEMIA: ICD-10-CM

## 2024-10-18 DIAGNOSIS — K91.2 POSTOPERATIVE MALABSORPTION: Primary | ICD-10-CM

## 2024-10-18 DIAGNOSIS — D64.9 ANEMIA, UNSPECIFIED TYPE: Primary | ICD-10-CM

## 2024-10-18 RX ORDER — IBUPROFEN 200 MG
1 CAPSULE ORAL DAILY
Qty: 90 TABLET | Refills: 5 | Status: SHIPPED | OUTPATIENT
Start: 2024-10-18 | End: 2026-04-11

## 2024-10-18 RX ORDER — MAGNESIUM 200 MG
TABLET ORAL
Qty: 50 TABLET | Refills: 3 | Status: SHIPPED | OUTPATIENT
Start: 2024-10-18

## 2024-10-18 RX ORDER — BACILLUS COAGULANS 1B CELL
CAPSULE ORAL
Qty: 100 TABLET | Refills: 0 | Status: SHIPPED | OUTPATIENT
Start: 2024-10-18 | End: 2025-01-07

## 2024-10-18 RX ORDER — CHOLECALCIFEROL (VITAMIN D3) 125 MCG
1 CAPSULE ORAL DAILY
Qty: 180 CAPSULE | Refills: 1 | Status: SHIPPED | OUTPATIENT
Start: 2024-10-18 | End: 2025-10-18

## 2024-11-08 DIAGNOSIS — E66.813 CLASS 3 SEVERE OBESITY DUE TO EXCESS CALORIES WITH BODY MASS INDEX (BMI) OF 40.0 TO 44.9 IN ADULT, UNSPECIFIED WHETHER SERIOUS COMORBIDITY PRESENT (H): ICD-10-CM

## 2024-11-08 DIAGNOSIS — E66.01 CLASS 3 SEVERE OBESITY DUE TO EXCESS CALORIES WITH BODY MASS INDEX (BMI) OF 40.0 TO 44.9 IN ADULT, UNSPECIFIED WHETHER SERIOUS COMORBIDITY PRESENT (H): ICD-10-CM

## 2024-11-11 ENCOUNTER — TELEPHONE (OUTPATIENT)
Dept: SURGERY | Facility: CLINIC | Age: 44
End: 2024-11-11
Payer: COMMERCIAL

## 2024-11-11 DIAGNOSIS — E66.813 CLASS 3 SEVERE OBESITY DUE TO EXCESS CALORIES WITH BODY MASS INDEX (BMI) OF 40.0 TO 44.9 IN ADULT, UNSPECIFIED WHETHER SERIOUS COMORBIDITY PRESENT (H): Primary | ICD-10-CM

## 2024-11-11 DIAGNOSIS — E66.01 CLASS 3 SEVERE OBESITY DUE TO EXCESS CALORIES WITH BODY MASS INDEX (BMI) OF 40.0 TO 44.9 IN ADULT, UNSPECIFIED WHETHER SERIOUS COMORBIDITY PRESENT (H): Primary | ICD-10-CM

## 2024-11-11 NOTE — TELEPHONE ENCOUNTER
Prior Authorization Retail Medication Request    Medication/Dose: Wegovy 2.4mg/0.75ml Auto-injectors--renewal  New/renewal/insurance change PA/secondary ins. PA:  Previously Tried and Failed:  Pt has been taking this medication with good results.   Rationale:  Previous approval  on 2024    Weight when stating Wegovy in 2023=246 lb  Weight as of 2024=236 lb     Key: [id number]    Pharmacy Information (if different than what is on RX)  [facility]  [facility]  Phone:  [phone]  Fax:  [phone]    Clinic Information  Preferred routing pool for dept communication: Bariatric Surgery Support [facility] [place]

## 2024-11-11 NOTE — TELEPHONE ENCOUNTER
Retail Pharmacy Prior Authorization Team   Phone: [phone]    PA Initiation    Medication: WEGOVY 2.4 MG/0.75ML SC SOAJ  Insurance Company: [employer] - Phone [phone] Fax [phone]  Pharmacy Filling the Rx: [id number] - [place], [place] - [address]  Filling Pharmacy Phone: [phone]  Filling Pharmacy Fax:    Start Date: 11/11/2024    CHENG IS DOWN - CALLED [employer] AND SPOKE WITH KAREN. WILL BE FAXING THE PRIOR AUTHORIZATION FORM OVER SHORTLY.

## 2024-11-11 NOTE — TELEPHONE ENCOUNTER
PRIOR AUTHORIZATION DENIED    Medication: WEGOVY 2.4 MG/0.75ML SC SOAJ  Insurance Company: [employer] - Phone [phone] Fax [phone]  Denial Date: 11/11/2024  Denial Reason(s): MUST HAVE LOST 5% OR MORE OF BASELINE WEIGHT      Appeal Information: IF THE PROVIDER WOULD LIKE TO APPEAL THIS DECISION PLEASE PROVIDE THE PA TEAM WITH A LETTER OF MEDICAL NECESSITY      Patient Notified: NO

## 2024-11-11 NOTE — PROGRESS NOTES
Patient has not had efficacious weight loss on Wegovy 2.4mg/week and insurance denied continued use as she's not hit 5% reduction in weight since starting. We'll transition to Zepbound 5mg/week to be taken 7 days after her last dose of Wegovy and then ramp up to 7.5mg/week for 4 weeks and then 10mg/week for 3-6 months before considering further titration if needed.     Manoj Stover MD  [facility]

## 2024-11-12 ENCOUNTER — TELEPHONE (OUTPATIENT)
Dept: SURGERY | Facility: CLINIC | Age: 44
End: 2024-11-12
Payer: COMMERCIAL

## 2024-11-12 NOTE — TELEPHONE ENCOUNTER
Prior Authorization Retail Medication Request    Medication/Dose: Zepbound 5mg/0.5ml Auto-injectors.   New/renewal/insurance change PA/secondary ins. PA:  Previously Tried and Failed:  Wegovy  Rationale:  Was on Wegovy 2.4 mg and it was denied, will transition to Zepbound starting at the 5mg dose and ramping up.     Key: [id number]    Pharmacy Information (if different than what is on RX)  Name:  [facility]  Phone:  [phone]  Fax:  [phone]    Clinic Information  Preferred routing pool for dept communication: Bariatric Surgery Support [facility] [place]

## 2024-11-15 NOTE — TELEPHONE ENCOUNTER
Retail Pharmacy Prior Authorization Team   Phone: [phone]      PA Initiation    Medication: ZEPBOUND 5 MG/0.5ML SC SOAJ  Insurance Company: [employer] - Phone [phone] Fax [phone]  Pharmacy Filling the Rx: [id number] - [place], [place] - [address]  Filling Pharmacy Phone: [phone]  Filling Pharmacy Fax: [phone]  Start Date: 11/15/2024

## 2024-11-18 NOTE — TELEPHONE ENCOUNTER
Retail Pharmacy Prior Authorization Team   Phone: [phone]      Prior Authorization Approval    Medication: ZEPBOUND 5 MG/0.5ML SC SOAJ  Authorization Effective Date: 11/18/2024  Authorization Expiration Date: 5/18/2025  Approved Dose/Quantity:   Reference #:     Insurance Company: [employer] - Phone [phone] Fax [phone]  Expected CoPay: $    CoPay Card Available: No    Financial Assistance Needed:   Which Pharmacy is filling the prescription: [id number] [place], [place] - [address] [place] [address]  Pharmacy Notified: Yes  Patient Notified: **Instructed pharmacy to notify patient when script is ready to /ship.**

## 2024-11-19 ENCOUNTER — ANCILLARY PROCEDURE (OUTPATIENT)
Dept: MAMMOGRAPHY | Facility: HOSPITAL | Age: 44
End: 2024-11-19
Attending: FAMILY MEDICINE
Payer: COMMERCIAL

## 2024-11-19 DIAGNOSIS — Z12.31 VISIT FOR SCREENING MAMMOGRAM: ICD-10-CM

## 2024-11-19 PROCEDURE — 77063 BREAST TOMOSYNTHESIS BI: CPT

## 2024-12-12 ENCOUNTER — VIRTUAL VISIT (OUTPATIENT)
Dept: SURGERY | Facility: CLINIC | Age: 44
End: 2024-12-12
Payer: COMMERCIAL

## 2024-12-12 VITALS — WEIGHT: 240 LBS | HEIGHT: 60 IN | BODY MASS INDEX: 47.12 KG/M2

## 2024-12-12 DIAGNOSIS — E66.813 CLASS 3 SEVERE OBESITY DUE TO EXCESS CALORIES WITH SERIOUS COMORBIDITY AND BODY MASS INDEX (BMI) OF 45.0 TO 49.9 IN ADULT (H): ICD-10-CM

## 2024-12-12 DIAGNOSIS — K91.2 POSTOPERATIVE MALABSORPTION: ICD-10-CM

## 2024-12-12 DIAGNOSIS — Z98.84 HX OF GASTRIC BYPASS: ICD-10-CM

## 2024-12-12 DIAGNOSIS — R79.89 ELEVATED TSH: Primary | ICD-10-CM

## 2024-12-12 DIAGNOSIS — D50.8 OTHER IRON DEFICIENCY ANEMIA: ICD-10-CM

## 2024-12-12 DIAGNOSIS — E66.01 CLASS 3 SEVERE OBESITY DUE TO EXCESS CALORIES WITH SERIOUS COMORBIDITY AND BODY MASS INDEX (BMI) OF 45.0 TO 49.9 IN ADULT (H): ICD-10-CM

## 2024-12-12 NOTE — PROGRESS NOTES
Bariatric Follow Up Visit with a History of Previous Bariatric Surgery     Date of visit: 12/11/2024  Physician: Manoj Stover MD, MD  Primary Care Provider:  Keiry Barrientos  Leah Duranjane   44 year old  female    Date of Surgery: 7/15/15  Initial Weight: 311 lbs  Initial BMI: 59.7  Today's Weight:   Wt Readings from Last 1 Encounters:   12/12/24 108.9 kg (240 lb)     Weight history:   Wt Readings from Last 4 Encounters:   12/12/24 108.9 kg (240 lb)   07/15/24 107.8 kg (237 lb 9.6 oz)   01/30/24 105.7 kg (233 lb)   10/25/23 104.3 kg (230 lb)      Body mass index is 46.79 kg/m .      Assessment and Plan     Assessment: Leah is a 44 year old year old female who is 9.5 years s/p  Pavithra en Y Gastric Bypass with Dr. Olivier. She's been treating resistant Class III morbid obesity with a combination of bariatric surgery/dietary routines and has been using Wegovy with some help in stabilizing weight through a very stressful family time in her life (sick child and spouse undergoing alcohol dependency). In the interim since our 7/15/24 visit, her insurance failed to continue covering Wegovy due to failure to achieve a 5% weight reduction and we transitioned to Zepbound 11/11/24 with goal to ramp to 10mg/week. She's up to 2.5mg/week.    Bariatric labs 10/14/24 showed mild elevation in TSH, some persistent but mild anemia (10.2g/dL) with low ferritin (8), lower end of normal range B12 (368pg/mL), MCV 78. A higher iron intake goal encouraged for now with goal ferritin above 70. She struggles with tolerating so many vitamins so we discussed one a day options to pair with her calcium citrate.  Her weight is down 71 lbs from introductory weight, a 22.8% total body weight reduction.    Leah John feels as if she hasn't fully achieved the goals she hoped to accomplish through bariatric surgery and weight loss.  She appears to be someone in need of both medical/surgical support for resistant Class III obesity following  excellent but slightly lower than expected weight loss following gastric bypass.    Encounter Diagnoses   Name Primary?    Postoperative malabsorption     Class 3 severe obesity due to excess calories with serious comorbidity and body mass index (BMI) of 45.0 to 49.9 in adult (H)     Elevated TSH Yes    Hx of gastric bypass     Other iron deficiency anemia          Current Outpatient Medications:     calcium citrate (CITRACAL) 950 (200 Ca) MG tablet, Take 1 tablet (950 mg) by mouth daily., Disp: 90 tablet, Rfl: 5    calcium citrate (CITRACAL) 950 (200 Ca) MG tablet, Take 1 tablet by mouth 2 times daily, Disp: , Rfl:     cholecalciferol (D-3-5) 125 MCG (5000 UT) CAPS, Take 1 capsule (5,000 Units) by mouth daily., Disp: 180 capsule, Rfl: 1    Cyanocobalamin (B-12) 1000 MCG SUBL, Take 2 days weekly, Disp: 50 tablet, Rfl: 3    Elemental iron 65 mg Vitamin C 125 mg (VITRON-C)  MG TABS tablet, Take 1 tablet by mouth 2 times daily for 21 days, THEN 1 tablet daily. Take one each evening after supper if tolerated.., Disp: 100 tablet, Rfl: 0    ferrous fumarate 65 mg, [place]. FE,-Vitamin C 125 mg (VITRON-C)  MG TABS tablet, Use twice daily, 20minutes after meals for 30 days then reduce to once daily dosing thereafter for 2 months until gone., Disp: 120 tablet, Rfl: 0    FLUoxetine (PROZAC) 20 MG capsule, Take 1 capsule (20 mg) by mouth daily, Disp: 90 capsule, Rfl: 3    gabapentin (NEURONTIN) 100 MG capsule, TAKE TWO CAPSULES BY MOUTH DAILY AT BEDTIME, Disp: 60 capsule, Rfl: 3    Multiple Vitamin (MULTIVITAMIN ADULT PO), , Disp: , Rfl:     [START ON 1/10/2025] tirzepatide-Weight Management (ZEPBOUND) 10 MG/0.5ML prefilled pen, Inject 0.5 mLs (10 mg) subcutaneously every 7 days., Disp: 6 mL, Rfl: 1    tirzepatide-Weight Management (ZEPBOUND) 5 MG/0.5ML prefilled pen, Inject 0.5 mLs (5 mg) subcutaneously every 7 days for 28 days., Disp: 2 mL, Rfl: 0    [START ON 12/13/2024] tirzepatide-Weight Management (ZEPBOUND) 7.5  MG/0.5ML prefilled pen, Inject 0.5 mLs (7.5 mg) subcutaneously every 7 days for 28 days., Disp: 2 mL, Rfl: 0    vitamin B-12 (CYANOCOBALAMIN) 500 MCG tablet, , Disp: , Rfl:     Vitamin D (Cholecalciferol) 25 MCG (1000 UT) TABS, , Disp: , Rfl:     Plan:  Aiming for regular protein rich meals through the day to nourish your weight loss and maintain good metabolic health/support. 15-20grams of lean protein 3-4x daily, supplementing if needed to hit your 70-85grams/day target range, should keep you on track if hydrating well between meals and using your vitamins regularly.   2. Zepbound will ramp up monthly if tolerating previous dose well. If poorly tolerated, message and adjustments will be made.     3. For simplicity, you may consider a one a day bariatric vitamin like Celebrate One or Bariatric Advantage Ultra Solo with Iron versus BariLife Just One a Day.  Add a 500mg calcium citrate once daily and that should give you all that you need.    4. Recheck blood counts/iron levels in mid to late January and we'll make sure the modest elevation in TSH has normalized.     5. Progress check with me in 4 months. Follow up with dietician if struggling with nourishement: [phone] can help you schedule.      No follow-ups on file.    Bariatric Surgery Review     Interim History/LifeChanges: has started Zepbound. Tolerating it well and finding it to be better at appetite control. Struggling with vitamin regimen so reviewed options for one a day varieties that may work for her.   Patient Concerns: vitamin burps  Appetite (1-10): improving noticeably since switching to Zepbound.  GERD: mild.    Reviewed whether any need/indication for screening EGD today and we will deferred.  Typically, a screening EGD is recommend post op year 2-3 if no symptoms to assess health of esophagus/bariatric surgery and sooner if difficult to control GERD or persistent pain/dysphagia sx despite behavior modification.    Medication changes: as  above    Vitamin Intake:   B-12   yes   MVI  yes   Vitamin D  yes   Calcium   yes     Other  Vitron C              LABS: ordered     [facility] Bariatric    Question 12/9/2024 12:15 PM CST - Filed by Patient   THESE QUESTIONS ARE ABOUT YOUR WEIGHT    How has your weight changed since your last visit? I have stayed about the same   What is your lowest weight since surgery? (In pounds) 225   Are you currently taking any weight loss medications? Yes   What is your highest lifetime weight? 310   I had the following weight loss procedure: Pavithra-en-y Gastric Bypass   What year was your surgery? 2015   THESE QUESTIONS ARE ABOUT YOUR DIET AND PHYSICAL ACTIVITY    How many meals do you eat per day? 3   Do you snack between meals? Sometimes   How much food are you eating at each meal? Greater than 1 cup   Are you able to separate your meals and liquids by at least 30 minutes? Sometimes   Are you able to avoid liquid calories? Sometimes   Do you avoid NSAIDs such as (Ibuprofen, Aleve, Naproxen, Advil)? Yes   How often do you exercise? 1 to 2 times per week   What is the duration of your exercise (in minutes)? 30 Minutes   What types of exercise do you do? other   What keeps you from being more active? Lack of Time   Are you smoking? No   Are you drinking alcohol? Yes   How much alcohol? Glass of wine 3 nights a week   Review of symptoms: Please check the following symptoms you have experienced within the last 30 days.    Vomiting: No   Diarrhea: No   Constipation: No   Swallowing trouble: No   Heartburn: No   Abdominal pain: No   Rash in skin folds: No   Depression: Yes   Anxiety: No   Stress urinary incontinence No   Female only: Loss of menstrual cycles    Irregular menstrual cycles    Polycystic ovarian syndrome (PCOS)    Birth control   Please faith all conditions you had prior to having weight loss surgery:    Diabetes II: Never   High Blood Pressure: Never   High cholesterol: Improved   Heartburn/Reflux: Never   Sleep  apnea: Never   Pre-diabetes: Gone away   PCOS: Stayed the same   Back pain: Improved   Joint pain: Improved   Lower leg swelling: Never   THESE QUESTIONS ASK ABOUT CURRENT HEALTH CONCERNS    Have you been to the Emergency room since your last visit with us? No   Were you in the hospital since your last visit with us? No   Do you have any concerns today? None   Do you currently have any of the following: None of the above   Do you have a band? No     [employer]   [id number] [employer] [employer] Version 1.1    Question 12/9/2024 12:16 PM CST - Filed by Patient   In general, would you say your health is: Good   In general, would you say your quality of life is: Good   In general, how would you rate your physical health? Good   In general, how would you rate your mental health, including your mood and your ability to think? Very good   In general, how would you rate your satisfaction with your social activities and relationships? Very good   In general, please rate how well you carry out your usual social activities and roles. (This includes activities at home, at work and in your community, and responsibilities as a parent, child, spouse, employee, friend, etc.) Very good   To what extent are you able to carry out your everyday physical activities such as walking, climbing stairs, carrying groceries, or moving a chair? Completely   In the past 7 days    How often have you been bothered by emotional problems such as feeling anxious, depressed or irritable? Sometimes   How would you rate your fatigue on average? Moderate   How would you rate your pain on average?   0 = No Pain  to  10 = Worst Imaginable Pain 1         Most recent labs:  Lab Results   Component Value Date    WBC 5.9 10/14/2024    HGB 10.2 (L) 10/14/2024    HCT 32.2 (L) 10/14/2024    MCV 78 10/14/2024     10/14/2024     Lab Results   Component Value Date    CHOL 199 01/30/2024     Lab Results   Component Value Date  "   HDL 64 01/30/2024     No components found for: \"LDLCALC\"  Lab Results   Component Value Date    TRIG 76 01/30/2024     No results found for: \"CHOLHDL\"  Lab Results   Component Value Date    ALT 20 10/14/2024    AST 27 10/14/2024    ALKPHOS 96 10/14/2024     No results found for: \"HGBA1C\"  Lab Results   Component Value Date    B12 368 10/14/2024     No components found for: \"VITDT1\"  Lab Results   Component Value Date    JAMAR 8 10/14/2024     Lab Results   Component Value Date    PTHI 47 08/10/2023     Lab Results   Component Value Date    ZN 82.7 10/14/2024     Lab Results   Component Value Date    VIB1WB 111 10/14/2024     Lab Results   Component Value Date    TSH 5.26 (H) 10/14/2024     No results found for: \"TEST\"       Rbs/Giacomo-Ump Bariatric    Question 12/9/2024 12:15 PM CST - Filed by Patient   THESE QUESTIONS ARE ABOUT YOUR WEIGHT    How has your weight changed since your last visit? I have stayed about the same   What is your lowest weight since surgery? (In pounds) 225   Are you currently taking any weight loss medications? Yes   What is your highest lifetime weight? 310   I had the following weight loss procedure: Pavithra-en-y Gastric Bypass   What year was your surgery? 2015   THESE QUESTIONS ARE ABOUT YOUR DIET AND PHYSICAL ACTIVITY    How many meals do you eat per day? 3   Do you snack between meals? Sometimes   How much food are you eating at each meal? Greater than 1 cup   Are you able to separate your meals and liquids by at least 30 minutes? Sometimes   Are you able to avoid liquid calories? Sometimes   Do you avoid NSAIDs such as (Ibuprofen, Aleve, Naproxen, Advil)? Yes   How often do you exercise? 1 to 2 times per week   What is the duration of your exercise (in minutes)? 30 Minutes   What types of exercise do you do? other   What keeps you from being more active? Lack of Time   Are you smoking? No   Are you drinking alcohol? Yes   How much alcohol? Glass of wine 3 nights a week   Review of " symptoms: Please check the following symptoms you have experienced within the last 30 days.    Vomiting: No   Diarrhea: No   Constipation: No   Swallowing trouble: No   Heartburn: No   Abdominal pain: No   Rash in skin folds: No   Depression: Yes   Anxiety: No   Stress urinary incontinence No   Female only: Loss of menstrual cycles    Irregular menstrual cycles    Polycystic ovarian syndrome (PCOS)    Birth control   Please faith all conditions you had prior to having weight loss surgery:    Diabetes II: Never   High Blood Pressure: Never   High cholesterol: Improved   Heartburn/Reflux: Never   Sleep apnea: Never   Pre-diabetes: Gone away   PCOS: Stayed the same   Back pain: Improved   Joint pain: Improved   Lower leg swelling: Never   THESE QUESTIONS ASK ABOUT CURRENT HEALTH CONCERNS    Have you been to the Emergency room since your last visit with us? No   Were you in the hospital since your last visit with us? No   Do you have any concerns today? None   Do you currently have any of the following: None of the above   Do you have a band? No     [employer]-10   [id number] [employer] Version 1.1    Question 12/9/2024 12:16 PM CST - Filed by Patient   In general, would you say your health is: Good   In general, would you say your quality of life is: Good   In general, how would you rate your physical health? Good   In general, how would you rate your mental health, including your mood and your ability to think? Very good   In general, how would you rate your satisfaction with your social activities and relationships? Very good   In general, please rate how well you carry out your usual social activities and roles. (This includes activities at home, at work and in your community, and responsibilities as a parent, child, spouse, employee, friend, etc.) Very good   To what extent are you able to carry out your everyday physical activities such as walking, climbing stairs, carrying  groceries, or moving a chair? Completely   In the past 7 days    How often have you been bothered by emotional problems such as feeling anxious, depressed or irritable? Sometimes   How would you rate your fatigue on average? Moderate   How would you rate your pain on average?   0 = No Pain  to  10 = Worst Imaginable Pain 1       Social History     Social History     Socioeconomic History    Marital status:      Spouse name: Not on file    Number of children: Not on file    Years of education: Not on file    Highest education level: Not on file   Occupational History    Not on file   Tobacco Use    Smoking status: Never     Passive exposure: Never    Smokeless tobacco: Never   Vaping Use    Vaping status: Never Used   Substance and Sexual Activity    Alcohol use: Yes     Alcohol/week: 2.5 standard drinks of alcohol     Types: 3 Standard drinks or equivalent per week    Drug use: No    Sexual activity: Yes     Partners: Male     Birth control/protection: I.U.D., Other   Other Topics Concern    Parent/sibling w/ CABG, MI or angioplasty before 65F 55M? No   Social History Narrative    Not on file     Social Drivers of Health     Financial Resource Strain: Low Risk  (1/29/2024)    Financial Resource Strain     Within the past 12 months, have you or your family members you live with been unable to get utilities (heat, electricity) when it was really needed?: No   Food Insecurity: Low Risk  (1/29/2024)    Food Insecurity     Within the past 12 months, did you worry that your food would run out before you got money to buy more?: No     Within the past 12 months, did the food you bought just not last and you didn t have money to get more?: No   Transportation Needs: Low Risk  (1/29/2024)    Transportation Needs     Within the past 12 months, has lack of transportation kept you from medical appointments, getting your medicines, non-medical meetings or appointments, work, or from getting things that you need?: No    Physical Activity: Not on file   Stress: Not on file   Social Connections: Not on file   Interpersonal Safety: Low Risk  (1/30/2024)    Interpersonal Safety     Do you feel physically and emotionally safe where you currently live?: Yes     Within the past 12 months, have you been hit, slapped, kicked or otherwise physically hurt by someone?: No     Within the past 12 months, have you been humiliated or emotionally abused in other ways by your partner or ex-partner?: No   Housing Stability: Low Risk  (1/29/2024)    Housing Stability     Do you have housing? : Yes     Are you worried about losing your housing?: No       Past Medical History     Past Medical History:   Diagnosis Date    Anisometropia     Anxiety     Cervical intraepithelial neoplasia III 2013    ALFRED III (cervical intraepithelial neoplasia III) 7/20/2012 2005, 2006 NIL paps 10/17/07 ASCUS, neg HPV 2008, 2009, 2011 NIL paps 6/25/12 ASC-H 7/20/12 Colpo bx ALFRED I, II, III, ECC neg 9/13/12 LEEP ALFRED I & III, margins neg, ECC neg 4/8/13 NIL pap, neg HPV 10/3/13 NIL pap, neg HPV 1/15/15 NIL pap, neg HPV Above per Care Everywhere 12/29/16 NIL pap, neg HPV 8/17/21 NIL pap, +HR HPV (not 16/18). Plan: colposcopy due before 11/17/21 9/29/21 [place]: atypical squa    Depression     20s; on medications zoloft; prozac x 1 year    Depressive disorder     Dyslipidemia 2014    Hyperlipidemia with target LDL less than 160 1/11/2011    Formatting of this note might be different from the original. ICD 10    Hyperparathyroidism (H) 1/24/2019    Hypothyroidism     Iron deficiency anemia 1/20/2018    Knee pain 3/14/2011    Low vitamin B12 level 1/24/2019    Metabolic syndrome     Morbid obesity (H)     Morbid obesity with BMI of 50.0-59.9, adult (H) 3/25/2015    MTHFR mutation     heterozygous    PCOS (polycystic ovarian syndrome)     Plantar fasciitis 6/10/2022    Polycystic ovary syndrome     Pre-diabetes     Prothrombin gene mutation (H) 01/17/2017    heterozygous    S/P  "bariatric surgery 7/15/2015    Strabismus     Stress fracture of right foot, initial encounter 6/10/2022    Vitamin D deficiency 2019     Past Surgical History:   Procedure Laterality Date    ABDOMEN SURGERY  2015    Gastric bypass    BIOPSY CERVICAL, LOCAL EXCISION, SINGLE/MULTIPLE      ALFRED III     SECTION N/A 2018    Procedure: PRIMARY  SECTION;  Surgeon: Becky Rg MD;  Location: [facility];  Service:     COLONOSCOPY      EXCISE GANGLION WRIST Right     EYE SURGERY      x3; as a child    LEEP TX, CERVICAL      DE LAP GASTRIC BYPASS/CRISTIANE-EN-Y N/A 07/15/2015    Mark Olivier MD; [facility] Initial Weight 311 lbs, BMI 59.7    SOFT TISSUE SURGERY      Ganglion cyst removal       Problem List     Patient Active Problem List   Diagnosis    Knee pain    Morbid obesity (H)    Anisometropia    Anxiety    Hyperlipidemia with target LDL less than 160    Hyperparathyroidism (H)    Hypothyroidism    Low vitamin B12 level    Metabolic syndrome    MTHFR mutation    PCOS (polycystic ovarian syndrome)    Pre-diabetes    S/P bariatric surgery    Vitamin D deficiency    ALFRED III (cervical intraepithelial neoplasia III)    Stress fracture of right foot, initial encounter    Plantar fasciitis     Medications     [unfilled]  Surgical History     Past Surgical History  She has a past surgical history that includes leep tx, cervical (); Excise ganglion wrist (Right); Eye surgery; Pr Lap Gastric Bypass/Cristiane-En-Y (N/A, 07/15/2015); Biopsy cervical, local excision, single/multiple ();  Section (N/A, 2018); Abdomen surgery (2015); colonoscopy (); and Soft tissue surgery ().    Objective-Exam     Constitutional:  Ht 1.525 m (5' 0.05\")   Wt 108.9 kg (240 lb)   BMI 46.79 kg/m    [unfilled]   General:  Pleasant and in no acute distress   Eyes:  EOMI  ENT:  Airway patent.     Neck:  Respiratory: Normal respiratory effort, no cough, .  CV:  "   Gastrointestinal:   Musculoskeletal: muscle mass WNL  Skin: color fair, hair red,   Psychiatric: alert and oriented X3, mood and affect normal    Counseling   Reviewed nutritional/vitamin needs and medication use/side effect profile.     Manoj Stover MD    [facility].  2024  9:20 PM  [phone] (clinic phone)  [phone] (fax)    No images are attached to the encounter.  Medical Decision Makin minutes spent by me on the date of the encounter doing chart review, history and exam, documentation and further activities per the note

## 2024-12-12 NOTE — LETTER
12/12/2024      Leah John  [address] [place] [address]      Dear Colleague,    Thank you for referring your patient, Leah John, to the [facility] [place]. Please see a copy of my visit note below.    Bariatric Follow Up Visit with a History of Previous Bariatric Surgery     Date of visit: 12/11/2024  Physician: Manoj Stover MD, MD  Primary Care Provider:  Keiry Barrientos  Leah Duranjane   44 year old  female    Date of Surgery: 7/15/15  Initial Weight: 311 lbs  Initial BMI: 59.7  Today's Weight:   Wt Readings from Last 1 Encounters:   12/12/24 108.9 kg (240 lb)     Weight history:   Wt Readings from Last 4 Encounters:   12/12/24 108.9 kg (240 lb)   07/15/24 107.8 kg (237 lb 9.6 oz)   01/30/24 105.7 kg (233 lb)   10/25/23 104.3 kg (230 lb)      Body mass index is 46.79 kg/m .      Assessment and Plan     Assessment: Leah is a 44 year old year old female who is 9.5 years s/p  Pavithra en Y Gastric Bypass with Dr. Olivier. She's been treating resistant Class III morbid obesity with a combination of bariatric surgery/dietary routines and has been using Wegovy with some help in stabilizing weight through a very stressful family time in her life (sick child and spouse undergoing alcohol dependency). In the interim since our 7/15/24 visit, her insurance failed to continue covering Wegovy due to failure to achieve a 5% weight reduction and we transitioned to Zepbound 11/11/24 with goal to ramp to 10mg/week. She's up to 2.5mg/week.    Bariatric labs 10/14/24 showed mild elevation in TSH, some persistent but mild anemia (10.2g/dL) with low ferritin (8), lower end of normal range B12 (368pg/mL), MCV 78. A higher iron intake goal encouraged for now with goal ferritin above 70. She struggles with tolerating so many vitamins so we discussed one a day options to pair with her calcium citrate.  Her weight is down 71 lbs from introductory weight, a 22.8%  total body weight reduction.    Leah John feels as if she hasn't fully achieved the goals she hoped to accomplish through bariatric surgery and weight loss.  She appears to be someone in need of both medical/surgical support for resistant Class III obesity following excellent but slightly lower than expected weight loss following gastric bypass.    Encounter Diagnoses   Name Primary?     Postoperative malabsorption      Class 3 severe obesity due to excess calories with serious comorbidity and body mass index (BMI) of 45.0 to 49.9 in adult (H)      Elevated TSH Yes     Hx of gastric bypass      Other iron deficiency anemia          Current Outpatient Medications:      calcium citrate (CITRACAL) 950 (200 Ca) MG tablet, Take 1 tablet (950 mg) by mouth daily., Disp: 90 tablet, Rfl: 5     calcium citrate (CITRACAL) 950 (200 Ca) MG tablet, Take 1 tablet by mouth 2 times daily, Disp: , Rfl:      cholecalciferol (D-3-5) 125 MCG (5000 UT) CAPS, Take 1 capsule (5,000 Units) by mouth daily., Disp: 180 capsule, Rfl: 1     Cyanocobalamin (B-12) 1000 MCG SUBL, Take 2 days weekly, Disp: 50 tablet, Rfl: 3     Elemental iron 65 mg Vitamin C 125 mg (VITRON-C)  MG TABS tablet, Take 1 tablet by mouth 2 times daily for 21 days, THEN 1 tablet daily. Take one each evening after supper if tolerated.., Disp: 100 tablet, Rfl: 0     ferrous fumarate 65 mg, [place]. FE,-Vitamin C 125 mg (VITRON-C)  MG TABS tablet, Use twice daily, 20minutes after meals for 30 days then reduce to once daily dosing thereafter for 2 months until gone., Disp: 120 tablet, Rfl: 0     FLUoxetine (PROZAC) 20 MG capsule, Take 1 capsule (20 mg) by mouth daily, Disp: 90 capsule, Rfl: 3     gabapentin (NEURONTIN) 100 MG capsule, TAKE TWO CAPSULES BY MOUTH DAILY AT BEDTIME, Disp: 60 capsule, Rfl: 3     Multiple Vitamin (MULTIVITAMIN ADULT PO), , Disp: , Rfl:      [START ON 1/10/2025] tirzepatide-Weight Management (ZEPBOUND) 10 MG/0.5ML prefilled pen,  Inject 0.5 mLs (10 mg) subcutaneously every 7 days., Disp: 6 mL, Rfl: 1     tirzepatide-Weight Management (ZEPBOUND) 5 MG/0.5ML prefilled pen, Inject 0.5 mLs (5 mg) subcutaneously every 7 days for 28 days., Disp: 2 mL, Rfl: 0     [START ON 12/13/2024] tirzepatide-Weight Management (ZEPBOUND) 7.5 MG/0.5ML prefilled pen, Inject 0.5 mLs (7.5 mg) subcutaneously every 7 days for 28 days., Disp: 2 mL, Rfl: 0     vitamin B-12 (CYANOCOBALAMIN) 500 MCG tablet, , Disp: , Rfl:      Vitamin D (Cholecalciferol) 25 MCG (1000 UT) TABS, , Disp: , Rfl:     Plan:  Aiming for regular protein rich meals through the day to nourish your weight loss and maintain good metabolic health/support. 15-20grams of lean protein 3-4x daily, supplementing if needed to hit your 70-85grams/day target range, should keep you on track if hydrating well between meals and using your vitamins regularly.   2. Zepbound will ramp up monthly if tolerating previous dose well. If poorly tolerated, message and adjustments will be made.     3. For simplicity, you may consider a one a day bariatric vitamin like Celebrate One or Bariatric Advantage Ultra Solo with Iron versus BariLife Just One a Day.  Add a 500mg calcium citrate once daily and that should give you all that you need.    4. Recheck blood counts/iron levels in mid to late January and we'll make sure the modest elevation in TSH has normalized.     5. Progress check with me in 4 months. Follow up with dietician if struggling with nourishement: [phone] can help you schedule.      No follow-ups on file.    Bariatric Surgery Review     Interim History/LifeChanges: has started Zepbound. Tolerating it well and finding it to be better at appetite control. Struggling with vitamin regimen so reviewed options for one a day varieties that may work for her.   Patient Concerns: vitamin burps  Appetite (1-10): improving noticeably since switching to Zepbound.  GERD: mild.    Reviewed whether any need/indication  for screening EGD today and we will deferred.  Typically, a screening EGD is recommend post op year 2-3 if no symptoms to assess health of esophagus/bariatric surgery and sooner if difficult to control GERD or persistent pain/dysphagia sx despite behavior modification.    Medication changes: as above    Vitamin Intake:   B-12   yes   MVI  yes   Vitamin D  yes   Calcium   yes     Other  Vitron C              LABS: ordered     Rbs/Giacomo-Ump Bariatric    Question 12/9/2024 12:15 PM CST - Filed by Patient   THESE QUESTIONS ARE ABOUT YOUR WEIGHT    How has your weight changed since your last visit? I have stayed about the same   What is your lowest weight since surgery? (In pounds) 225   Are you currently taking any weight loss medications? Yes   What is your highest lifetime weight? 310   I had the following weight loss procedure: Pavithra-en-y Gastric Bypass   What year was your surgery? 2015   THESE QUESTIONS ARE ABOUT YOUR DIET AND PHYSICAL ACTIVITY    How many meals do you eat per day? 3   Do you snack between meals? Sometimes   How much food are you eating at each meal? Greater than 1 cup   Are you able to separate your meals and liquids by at least 30 minutes? Sometimes   Are you able to avoid liquid calories? Sometimes   Do you avoid NSAIDs such as (Ibuprofen, Aleve, Naproxen, Advil)? Yes   How often do you exercise? 1 to 2 times per week   What is the duration of your exercise (in minutes)? 30 Minutes   What types of exercise do you do? other   What keeps you from being more active? Lack of Time   Are you smoking? No   Are you drinking alcohol? Yes   How much alcohol? Glass of wine 3 nights a week   Review of symptoms: Please check the following symptoms you have experienced within the last 30 days.    Vomiting: No   Diarrhea: No   Constipation: No   Swallowing trouble: No   Heartburn: No   Abdominal pain: No   Rash in skin folds: No   Depression: Yes   Anxiety: No   Stress urinary incontinence No   Female only: Loss  of menstrual cycles    Irregular menstrual cycles    Polycystic ovarian syndrome (PCOS)    Birth control   Please faith all conditions you had prior to having weight loss surgery:    Diabetes II: Never   High Blood Pressure: Never   High cholesterol: Improved   Heartburn/Reflux: Never   Sleep apnea: Never   Pre-diabetes: Gone away   PCOS: Stayed the same   Back pain: Improved   Joint pain: Improved   Lower leg swelling: Never   THESE QUESTIONS ASK ABOUT CURRENT HEALTH CONCERNS    Have you been to the Emergency room since your last visit with us? No   Were you in the hospital since your last visit with us? No   Do you have any concerns today? None   Do you currently have any of the following: None of the above   Do you have a band? No     Promis-10   [id number] [facility] [employer] [facility] [employer] Version 1.1    Question 12/9/2024 12:16 PM CST - Filed by Patient   In general, would you say your health is: Good   In general, would you say your quality of life is: Good   In general, how would you rate your physical health? Good   In general, how would you rate your mental health, including your mood and your ability to think? Very good   In general, how would you rate your satisfaction with your social activities and relationships? Very good   In general, please rate how well you carry out your usual social activities and roles. (This includes activities at home, at work and in your community, and responsibilities as a parent, child, spouse, employee, friend, etc.) Very good   To what extent are you able to carry out your everyday physical activities such as walking, climbing stairs, carrying groceries, or moving a chair? Completely   In the past 7 days    How often have you been bothered by emotional problems such as feeling anxious, depressed or irritable? Sometimes   How would you rate your fatigue on average? Moderate   How would you rate your pain on average?   0 = No Pain  to  10 = Worst  "Imaginable Pain 1         Most recent labs:  Lab Results   Component Value Date    WBC 5.9 10/14/2024    HGB 10.2 (L) 10/14/2024    HCT 32.2 (L) 10/14/2024    MCV 78 10/14/2024     10/14/2024     Lab Results   Component Value Date    CHOL 199 01/30/2024     Lab Results   Component Value Date    HDL 64 01/30/2024     No components found for: \"LDLCALC\"  Lab Results   Component Value Date    TRIG 76 01/30/2024     No results found for: \"CHOLHDL\"  Lab Results   Component Value Date    ALT 20 10/14/2024    AST 27 10/14/2024    ALKPHOS 96 10/14/2024     No results found for: \"HGBA1C\"  Lab Results   Component Value Date    B12 368 10/14/2024     No components found for: \"VITDT1\"  Lab Results   Component Value Date    JAMAR 8 10/14/2024     Lab Results   Component Value Date    PTHI 47 08/10/2023     Lab Results   Component Value Date    ZN 82.7 10/14/2024     Lab Results   Component Value Date    VIB1WB 111 10/14/2024     Lab Results   Component Value Date    TSH 5.26 (H) 10/14/2024     No results found for: \"TEST\"       Rbs/Giacomo-Ump Bariatric    Question 12/9/2024 12:15 PM CST - Filed by Patient   THESE QUESTIONS ARE ABOUT YOUR WEIGHT    How has your weight changed since your last visit? I have stayed about the same   What is your lowest weight since surgery? (In pounds) 225   Are you currently taking any weight loss medications? Yes   What is your highest lifetime weight? 310   I had the following weight loss procedure: Pavithra-en-y Gastric Bypass   What year was your surgery? 2015   THESE QUESTIONS ARE ABOUT YOUR DIET AND PHYSICAL ACTIVITY    How many meals do you eat per day? 3   Do you snack between meals? Sometimes   How much food are you eating at each meal? Greater than 1 cup   Are you able to separate your meals and liquids by at least 30 minutes? Sometimes   Are you able to avoid liquid calories? Sometimes   Do you avoid NSAIDs such as (Ibuprofen, Aleve, Naproxen, Advil)? Yes   How often do you exercise? 1 to 2 " times per week   What is the duration of your exercise (in minutes)? 30 Minutes   What types of exercise do you do? other   What keeps you from being more active? Lack of Time   Are you smoking? No   Are you drinking alcohol? Yes   How much alcohol? Glass of wine 3 nights a week   Review of symptoms: Please check the following symptoms you have experienced within the last 30 days.    Vomiting: No   Diarrhea: No   Constipation: No   Swallowing trouble: No   Heartburn: No   Abdominal pain: No   Rash in skin folds: No   Depression: Yes   Anxiety: No   Stress urinary incontinence No   Female only: Loss of menstrual cycles    Irregular menstrual cycles    Polycystic ovarian syndrome (PCOS)    Birth control   Please faith all conditions you had prior to having weight loss surgery:    Diabetes II: Never   High Blood Pressure: Never   High cholesterol: Improved   Heartburn/Reflux: Never   Sleep apnea: Never   Pre-diabetes: Gone away   PCOS: Stayed the same   Back pain: Improved   Joint pain: Improved   Lower leg swelling: Never   THESE QUESTIONS ASK ABOUT CURRENT HEALTH CONCERNS    Have you been to the Emergency room since your last visit with us? No   Were you in the hospital since your last visit with us? No   Do you have any concerns today? None   Do you currently have any of the following: None of the above   Do you have a band? No     Promis-10   [id number] [employer] Version 1.1    Question 12/9/2024 12:16 PM CST - Filed by Patient   In general, would you say your health is: Good   In general, would you say your quality of life is: Good   In general, how would you rate your physical health? Good   In general, how would you rate your mental health, including your mood and your ability to think? Very good   In general, how would you rate your satisfaction with your social activities and relationships? Very good   In general, please rate how well you carry out your usual social  activities and roles. (This includes activities at home, at work and in your community, and responsibilities as a parent, child, spouse, employee, friend, etc.) Very good   To what extent are you able to carry out your everyday physical activities such as walking, climbing stairs, carrying groceries, or moving a chair? Completely   In the past 7 days    How often have you been bothered by emotional problems such as feeling anxious, depressed or irritable? Sometimes   How would you rate your fatigue on average? Moderate   How would you rate your pain on average?   0 = No Pain  to  10 = Worst Imaginable Pain 1       Social History     Social History     Socioeconomic History     Marital status:      Spouse name: Not on file     Number of children: Not on file     Years of education: Not on file     Highest education level: Not on file   Occupational History     Not on file   Tobacco Use     Smoking status: Never     Passive exposure: Never     Smokeless tobacco: Never   Vaping Use     Vaping status: Never Used   Substance and Sexual Activity     Alcohol use: Yes     Alcohol/week: 2.5 standard drinks of alcohol     Types: 3 Standard drinks or equivalent per week     Drug use: No     Sexual activity: Yes     Partners: Male     Birth control/protection: I.U.D., Other   Other Topics Concern     Parent/sibling w/ CABG, MI or angioplasty before 65F 55M? No   Social History Narrative     Not on file     Social Drivers of Health     Financial Resource Strain: Low Risk  (1/29/2024)    Financial Resource Strain      Within the past 12 months, have you or your family members you live with been unable to get utilities (heat, electricity) when it was really needed?: No   Food Insecurity: Low Risk  (1/29/2024)    Food Insecurity      Within the past 12 months, did you worry that your food would run out before you got money to buy more?: No      Within the past 12 months, did the food you bought just not last and you didn t  have money to get more?: No   Transportation Needs: Low Risk  (1/29/2024)    Transportation Needs      Within the past 12 months, has lack of transportation kept you from medical appointments, getting your medicines, non-medical meetings or appointments, work, or from getting things that you need?: No   Physical Activity: Not on file   Stress: Not on file   Social Connections: Not on file   Interpersonal Safety: Low Risk  (1/30/2024)    Interpersonal Safety      Do you feel physically and emotionally safe where you currently live?: Yes      Within the past 12 months, have you been hit, slapped, kicked or otherwise physically hurt by someone?: No      Within the past 12 months, have you been humiliated or emotionally abused in other ways by your partner or ex-partner?: No   Housing Stability: Low Risk  (1/29/2024)    Housing Stability      Do you have housing? : Yes      Are you worried about losing your housing?: No       Past Medical History     Past Medical History:   Diagnosis Date     Anisometropia      Anxiety      Cervical intraepithelial neoplasia III 2013     ALFRED III (cervical intraepithelial neoplasia III) 7/20/2012 2005, 2006 NIL paps 10/17/07 ASCUS, neg HPV 2008, 2009, 2011 NIL paps 6/25/12 ASC-H 7/20/12 Colpo bx ALFRED I, II, III, ECC neg 9/13/12 LEEP ALFRED I & III, margins neg, ECC neg 4/8/13 NIL pap, neg HPV 10/3/13 NIL pap, neg HPV 1/15/15 NIL pap, neg HPV Above per Care Everywhere 12/29/16 NIL pap, neg HPV 8/17/21 NIL pap, +HR HPV (not 16/18). Plan: colposcopy due before 11/17/21 9/29/21 [place]: atypical squa     Depression     20s; on medications zoloft; prozac x 1 year     Depressive disorder      Dyslipidemia 2014     Hyperlipidemia with target LDL less than 160 1/11/2011    Formatting of this note might be different from the original. ICD 10     Hyperparathyroidism (H) 1/24/2019     Hypothyroidism      Iron deficiency anemia 1/20/2018     Knee pain 3/14/2011     Low vitamin B12 level 1/24/2019      Metabolic syndrome      Morbid obesity (H)      Morbid obesity with BMI of 50.0-59.9, adult (H) 3/25/2015     MTHFR mutation     heterozygous     PCOS (polycystic ovarian syndrome)      Plantar fasciitis 6/10/2022     Polycystic ovary syndrome      Pre-diabetes      Prothrombin gene mutation (H) 2017    heterozygous     S/P bariatric surgery 7/15/2015     Strabismus      Stress fracture of right foot, initial encounter 6/10/2022     Vitamin D deficiency 2019     Past Surgical History:   Procedure Laterality Date     ABDOMEN SURGERY  2015    Gastric bypass     BIOPSY CERVICAL, LOCAL EXCISION, SINGLE/MULTIPLE      ALFRED III      SECTION N/A 2018    Procedure: PRIMARY  SECTION;  Surgeon: Becky Rg MD;  Location: [facility];  Service:      COLONOSCOPY       EXCISE GANGLION WRIST Right      EYE SURGERY      x3; as a child     LEEP TX, CERVICAL       PA LAP GASTRIC BYPASS/CRISTIANE-EN-Y N/A 07/15/2015    Mrak Olivier MD; [facility] Initial Weight 311 lbs, BMI 59.7     SOFT TISSUE SURGERY      Ganglion cyst removal       Problem List     Patient Active Problem List   Diagnosis     Knee pain     Morbid obesity (H)     Anisometropia     Anxiety     Hyperlipidemia with target LDL less than 160     Hyperparathyroidism (H)     Hypothyroidism     Low vitamin B12 level     Metabolic syndrome     MTHFR mutation     PCOS (polycystic ovarian syndrome)     Pre-diabetes     S/P bariatric surgery     Vitamin D deficiency     ALFRED III (cervical intraepithelial neoplasia III)     Stress fracture of right foot, initial encounter     Plantar fasciitis     Medications     [unfilled]  Surgical History     Past Surgical History  She has a past surgical history that includes leep tx, cervical (); Excise ganglion wrist (Right); Eye surgery; Pr Lap Gastric Bypass/Cristiane-En-Y (N/A, 07/15/2015); Biopsy cervical, local excision, single/multiple ();  Section (N/A,  "2018); Abdomen surgery (2015); colonoscopy (); and Soft tissue surgery ().    Objective-Exam     Constitutional:  Ht 1.525 m (5' 0.05\")   Wt 108.9 kg (240 lb)   BMI 46.79 kg/m    [unfilled]   General:  Pleasant and in no acute distress   Eyes:  EOMI  ENT:  Airway patent.     Neck:  Respiratory: Normal respiratory effort, no cough, .  CV:    Gastrointestinal:   Musculoskeletal: muscle mass WNL  Skin: color fair, hair red,   Psychiatric: alert and oriented X3, mood and affect normal    Counseling   Reviewed nutritional/vitamin needs and medication use/side effect profile.     Manoj Stover MD    [facility].  2024  9:20 PM  [phone] (clinic phone)  [phone] (fax)    No images are attached to the encounter.  Medical Decision Makin minutes spent by me on the date of the encounter doing chart review, history and exam, documentation and further activities per the note    Virtual Visit Details    Type of service:  Video Visit     Originating Location (pt. Location): Home    Distant Location (provider location):  On-site  Platform used for Video Visit: AprilWell          Again, thank you for allowing me to participate in the care of your patient.        Sincerely,        Manoj Stover MD  "

## 2024-12-12 NOTE — PROGRESS NOTES
Virtual Visit Details    Type of service:  Video Visit     Originating Location (pt. Location): Home    Distant Location (provider location):  On-site  Platform used for Video Visit: Mo

## 2024-12-12 NOTE — PATIENT INSTRUCTIONS
Plan:  Aiming for regular protein rich meals through the day to nourish your weight loss and maintain good metabolic health/support. 15-20grams of lean protein 3-4x daily, supplementing if needed to hit your 70-85grams/day target range, should keep you on track if hydrating well between meals and using your vitamins regularly.   2. Zepbound will ramp up monthly if tolerating previous dose well. If poorly tolerated, message and adjustments will be made.     3. For simplicity, you may consider a one a day bariatric vitamin like Celebrate One or Bariatric Advantage Ultra Solo with Iron versus BariLife Just One a Day.  Add a 500mg calcium citrate once daily and that should give you all that you need.    4. Recheck blood counts/iron levels in mid to late January and we'll make sure the modest elevation in TSH has normalized.     5. Progress check with me in 4 months. Follow up with dietician if struggling with nourishement: [phone] can help you schedule.        Zepbound/Mounjaro (Tirzepatide) is a very effective satiety boosting appetite suppressant that elevates satiety hormones GLP1 and GIP. It needs to be ramped up slowly to be tolerated adequately.  It helps release insulin in response to food when blood sugar runs higher than normal and is very helpful for diabetics in managing blood sugar levels with low risks for any low blood sugars.  About 1/10 people will not tolerate this medication. Each month, you move up to a higher dose until eventually reaching the 10mg/week dose if tolerated with further ramping to follow if needed. If intolerant or severe side effects, a dose decrease would be wise, so keep me posted if not tolerated the ramping well. This may be a longer term medication based on individual needs/physiology and appetite control.     Injections can be given after cleansing the skin with alcohol prep pad or swab (available OTC).     Stop Zepbound if severe abdominal pain/vomiting/rash/throat swelling  or constant nausea that prevents adequate food/water intake. Stop 2-3 weeks prior to any planned general anesthesia surgeries to reduce risk for something called a post operative ileus.     Gallstones can occur in about 1% of patients on this medication so update me if increase right upper abdominal pain after eating.     Start meals with protein first, separate beverages from meals by 20 minutes and work hard in between meals to get your 64-75 oz of water daily to reduce risks for severe constipation. Consider a fiber supplement like powdered psyllium husk in 12 oz water each night, stool softerners as needed and Miralax or milk of Magnesia if more than 3 days have passed without a Bowel Movement. Some other options include:  For Prevention and Treatment of Constipation when on Semaglutide     From least aggressive to most aggressive:     Move: Wallking is essential - the more we move, the more our bowels move  Water: Drink water - 64oz or more a day  Go when you need to go. Don't wait. The longer you wait, the harder it gets.  Fiber: Fruit, raw veggies, nuts, whole grains, prune each night, flax/kevon seeds added into meals can all be helpful.   Stool Softeners: if constipation is mild and for maintenance  Gentle laxatives: Miralax, senokot, dulcolax , Smooth move tea as needed     More aggressive (and typically won't get to this point)  Milk of Magnesia  Mag Citrate (what you drink before a colonoscopy)  Suppositories  Enema      Check out [facility] for patient resources.  If you have weekends off, I recommend dosing Friday evenings.     Some people starve on this medication if not mindful about food intake. I recommend starting meals with the protein part of your meal first, chew thoroughly and separate beverages from meals by about 20 minutes to make sure you get your nourishment in first. Include vegetables/complex carbohydrates and unsaturated fat as part of your balanced diet but group these at the end of  the meal, after your protein is mostly gone. Satiety will kick in too early if drinking too much with meals and under-nourishment can result.     It's not a bad idea to take a complete multivitamin most days of the week if using this medication. Lower iron content tends to be less constipating.     Adequate hydration is essential for feeling your best, efficient fat burning, waste elimination and constipation prevention. For those without fluid restrictions due to other disease, the goal is at least one ounce of water per gram of protein consumed with a  minimum of 64oz/day goal.     Pancreatitis is a very rare but potentially serious side effect. Stop Zepbound if severe mid abdominal pain/burning in nature or if unable to eat/drink due to severe nausea/discomfort.   People with strong history of pancreatitis without clear cause should stay clear of this medication as should those planning to get pregnant, those with strong personal or family history for medullary thyroid cancer or Multiple Endocrine Neoplasia (rare).     Stop Zepbound at least 2 weeks prior to any planned surgery.  Stop until fully recovered if unexpected/emergent surgery is needed with anticipation that re-ramping will be needed if off longer than 14-16 days since last dose.    Kind Regards,  Manoj Stover MD  [facility]  [facility]  Nutritional Guidelines  Gastric Bypass 18 Months Post Op and Beyond    General Guidelines and Helpful Hints:  Eat 3 meals per day + protein supplement(s). No snacks between meals.  Do not skip meals.  This can cause overeating at the next meal and will prevent adequate protein and nutritional intake.  Aim for 60-80 grams of protein per day.  Always eat your protein first. This assists with optimal nutrition and helps you stay full longer.  Depending on your portion size, you may need to drink approved protein supplement between meals to achieve protein goals.  "Follow recommendations of your Dietitian.   Eat your protein first, and then follow with fiber.   It is not necessary to count your fiber, but 15-20 grams per day is recommended.    Add fiber by including fruits, vegetables, whole grains, and beans.   Portions should remain about 1 cup per meal. Use measuring cups to be accurate.  Continue to use saucer/salad plates, infant/toddler silverware to keep portion sizes small and take small bites.  Eat S-L-O-W-L-Y to make each meal last 20-30 minutes. Always stop eating when satisfied.  Continue to use caution with foods containing skins, peels or membranes. Chew well!  Aim for 64 oz. of calorie-free fluids daily.  Continue to avoid caffeine and carbonation. If you choose to drink alcohol, do so in moderation.   Remember to avoid drinking during meals, 15-30 minutes before and 30 minutes after.  Exercise is benavidez for continued weight loss and weight maintenance. 150 minutes weekly of moderate aerobic activity or 75 minutes of vigorous with 2 days or more a week of strength training. Try to get 20% or more of your steps each day at a brisk pace, as though hurrying to a bus stop. Look to get stronger this year.  If having trouble tolerating meat, try using a crock-pot, tinfoil tent, steamer or other moist cooking method to create tender meats. Add broth or low-fat gravy to help meat stay moist.   Avoid high sugar and high fat foods to prevent dumping syndrome.  Check nutrition labels for less than 10 grams of sugar and less than 10 grams of fat per serving.  Continue Taking Vitamins/Minerals:  1000 mcg of Sublingual B-12 at least 3 days weekly to average 350-500mcg/day. If using 2500mcg lozenges, 2 weekly.  If 5000 mcg, once weekly dosing works.  1 Complete Multivitamin with 18mg Iron twice daily (chewable or swallow tabs). Often sold as \"women's one a day\" if tablet but take twice daily.  500-600 mg Calcium Citrate twice daily (chewable or swallow tabs).  5000 IU Vitamin D3 " "daily.  If menstruating, you may need closer to 60mg of iron daily to prevent iron deficiency. An occasional \"boost\" of extra iron supplement during/after is reasonable if heavy flow.    Sample Grocery List    Protein:  Fat free Greek or light yogurt (less than 10 grams sugar)  Fat free or low-fat cottage cheese  String cheese or reduced fat cheese slices  Tuna, salmon, crab, egg, or chicken salad made with light or fat free mayonnaise  Egg or Egg Substitute  Lean/extra lean turkey, beef, bison, venison (ground, sirloin, round, flank)  Pork loin or tenderloin (grilled, baked, broiled)  Fish such as salmon, tuna, trout, tilapia, etc. (grilled, baked, broiled)  Tender cuts of lean (skinless) turkey or chicken  Lean deli meats: turkey, lean ham, chicken, lean roast beef  Beans such as kidney, garbanzo, black, mendez, or low-fat/fat free refried beans  Peanut butter (natural preferred). Limit to 1 Tbsp. per day.  Low-fat meatloaf (made with lean ground beef or turkey)  Sloppy Joes made with low-sugar ketchup and lean ground beef or turkey  Soy or vegetable protein (i.e. vegan crumbles, soy/veggie burger, tofu)  Hummus    Vegetables:  Fresh: cooked or raw (as tolerated)  Frozen vegetables  Canned vegetables (low sodium or no salt added, rinse before cooking/eating)  (Ok to have skins/peels/membranes/seeds - just chew well)    Fruits:  Fresh fruit  Frozen fruit (no sugar added)  Canned fruit (packed in its own juice, NOT syrup)  (Ok to have skins/peels/membranes/seeds - just chew well)    Starch:  Unsweetened whole-grain hot cereal (or high fiber cold cereal, dry)  Toasted whole wheat bread or [place] Thins  Whole grain crackers  Baked /boiled/mashed potato/sweet potato  Cooked whole grain pasta, brown rice, or other cooked whole grains  Starchy vegetables: corn, peas, winter squash    Protein Supplement:   Ready to drink protein shake with:  15-30 grams protein per serving  Less than 10 grams total carbohydrate per " serving   Protein powder mixed with:   Skim or 1% milk  Low fat or fat free Lactaid milk, plain or no sugar added soymilk  Water     Fats: (use in moderation)  1 teaspoon of soft tub margarine  1 teaspoon olive oil, canola oil, or peanut oil  1 tablespoon of low-fat mccormick or salad dressing     Sample Menu for 18+ months after Gastric Bypass    You do NOT need to eat/drink the full portion sizes listed below  Always stop when you are satisfied    Breakfast   cup 1% cottage cheese     cup mixed berries   Lunch 2 oz lean roast beef on   [place] Thin with 1 tsp. light mccormick    small tomato, chopped, mixed with 1 tsp. light vinaigrette dressing   Supplement Approved protein supplement (if needed between meals)   Dinner 2 oz grilled salmon    cup salad greens with 1 tsp. light salad dressing and 1 tsp. ground flax seed    cup quinoa or brown rice     Breakfast   cup egg substitute with   cup sautéed chopped vegetables  2 light [place] Krisp crackers   Lunch Tuna Melt:   cup tuna mixed with 1 tsp. light mccormick over   [place] Thin. Top with 2-3 slices cucumber and 1 oz slice of low fat cheese   Supplement 1 cup skim milk (if needed between meals)   Dinner 3 oz  grilled, broiled, or baked seasoned skinless chicken breast    cup asparagus     Breakfast   cup plain oatmeal made with skim or 1% milk with 1 Tbsp. flavored/unflavored protein powder added  1 mozzarella string cheese   Lunch 2 oz deli turkey breast  1/3 cup salad with 1 tsp. light salad dressing, 1/8 of a whole avocado and 1 Tbsp. sunflower seeds   Dinner 3 oz. pork loin made in a crock pot, seasoned with a spice rub    cup cooked carrots   Supplement Approved protein supplement (if needed between meals)     Breakfast 1 cup breakfast casserole made with egg substitute, turkey sausage,  and steamed, chopped bell peppers   Supplement  1 cup light Greek yogurt (if needed between meals)   Lunch 2 oz. teriyaki turkey    cup mashed sweet potato with 1-2 spritzes of spray  butter    cup fresh pineapple   Dinner 3 oz low fat meatloaf    cup roasted garlic zucchini     Breakfast   cup leftover breakfast casserole    cup no sugar added applesauce with 1 Tbsp. unflavored protein powder and a sprinkle of cinnamon    Lunch 3 oz shrimp with 1-2 Tbsp. low-sugar cocktail sauce for dipping    c. whole wheat pasta drizzled with   tsp. olive oil   Supplement 1 cup skim/1% milk with scoop of protein powder (if needed between meals)   Dinner Grilled, seasoned kebob with 2 oz lean beef and   cup vegetables     Breakfast Breakfast pizza:   [place] Thin spread with 1 Tbsp. low sugar spaghetti sauce,   cup shredded low fat cheese, melted and 1 slice of [place] varma     cup fresh fruit mixed with chopped almonds   Lunch   cup black bean soup  4-5 whole grain crackers   Dinner 3 oz  tilapia with lemon pepper seasoning    cup stewed tomatoes   Supplement 1 string cheese (if needed between meals)     Breakfast 2 hard boiled eggs (discard 1 egg yolk)    whole wheat English Muffin with 1 tsp. low sugar jelly   Lunch   cup leftover black bean soup topped with 1-2 Tbsp. low fat cheese  2-3 light [place] Krisp crackers   Supplement Approved protein supplement (if needed between meals)   Dinner 3 oz sirloin steak    cup steamed broccoli      LEAN PROTEIN SOURCES  Getting 20-30 grams of protein, 3 meals daily, is appropriate for most people, some need more but more than about 40 grams per meal is not useful.  General rule is drinking one ounce of water per gram of protein eaten over the course of the day:  70 grams of protein each day, drink 70 oz of water.  Protein Source Portion Calories Grams of Protein                           Nonfat, plain Greek yogurt    (10 grams sugar or less) 3/4 cup (6 oz)  12-17   Light Yogurt (10 grams sugar or less) 3/4 cup (6 oz)  6-8   Protein Shake 1 shake 110-180 15-30   Skim/1% Milk or lactose-free milk 1 cup ( 8 oz)  8   Plain or light, flavored soymilk 1 cup   7-8   Plain or light, hemp milk 1 cup 110 6   Fat Free or 1% Cottage Cheese 1/2 cup 90 15   Part skim ricotta cheese 1/2 cup 100 14   Part skim or reduced fat cheese slices 1 ounce 65-80 8     Mozzarella String Cheese 1 80 8   Canned tuna, chicken, crab or salmon  (canned in water)  1/2 cup 100 15-20   White fish (broiled, grilled, baked) 3 ounces 100 21   [place]/Tuna (broiled, grilled, baked) 3 ounces 150-180 21   Shrimp, Scallops, Lobster, Crab 3 ounces 100 21   Pork loin, Pork Tenderloin 3 ounces 150 21   Boneless, skinless chicken /turkey breast                          (broiled, grilled, baked) 3 ounces 120 21   [place], [place], Sabine, and Venison 3 ounces 120 21   Lean cuts of red meat and pork (sirloin,   round, tenderloin, flank, ground 93%-96%) 3 ounces 170 21   Lean or Extra Lean Ground Turkey 1/2 cup 150 20   90-95% Lean [place] Burger 1 david 140-180 21   Low-fat casserole with lean meat 3/4 cup 200 17   Luncheon Meats                                                        (turkey, lean ham, roast beef, chicken) 3 ounces 100 21   Egg (boiled, poached, scrambled) 1 Egg 60 7   Egg Substitute 1/2 cup 70 10   Nuts (limit to 1 serving per day)  3 Tbsp. 150 7   Nut [place] (peanut, almond)  Limit to 1 serving or less daily 1 Tbsp. 90 4   Soy Burger (varies) 1  15   Garbanzo, Black, [place] Beans 1/2 cup 110 7   Refried Beans 1/2 cup 100 7   Kidney and Lima beans 1/2 cup 110 7   Tempeh 3 oz 175 18   Vegan crumbles 1/2 cup 100 14   Tofu 1/2 cup 110 14   Chili (beans and extra lean beef or turkey) 1 cup 200 23   Lentil Stew/Soup 1 cup 150 12   Black Bean Soup 1 cup 175 12

## 2024-12-12 NOTE — NURSING NOTE
Current patient location:  at work in [place], [place]    Is the patient currently in the state of [place]? YES    Visit mode:VIDEO    If the visit is dropped, the patient can be reconnected by: VIDEO VISIT: Send to e-mail at: [email or web address]    Will anyone else be joining the visit? NO  (If patient encounters technical issues they should call [phone]    Are changes needed to the allergy or medication list? Pt stated no changes to allergies and Pt stated no med changes    Are refills needed on medications prescribed by this physician? NO    Reason for visit: RECHECK    Hailey MOODY

## 2025-01-20 NOTE — PROGRESS NOTES
"Procedural Note - IUD Removal and Reinsertion    SUBJECTIVE:  Leah John is a 44 year old female who presents to clinic for removal and reinsertion of an IUD.  The alternative methods of contraception have been discussed with the patient and she would like to proceed with the procedure.      Allergies   Allergen Reactions    Nsaids      Hx of gastric bypass and should avoid due to ulcer/gastritis risks.    Sulfa Antibiotics Unknown       OBJECTIVE:  /72   Pulse 73   Temp 98.6  F (37  C) (Temporal)   Resp 16   Ht 1.525 m (5' 0.05\")   Wt 112 kg (247 lb)   SpO2 100%   BMI 48.16 kg/m      UPT negative    Removal Procedure:  Cervix was visualized with a speculum. The strings were grasped with a forceps and the IUD was removed with gentle traction. No resistance was encountered. The Mirena IUD immediately regained resting shape and was without odor or discoloration. There were no complications. Leah John reported no pain.    Insertion procedure:   Patient chooses this IUD type: Mirena   The cervix was cleansed with betadine. Tinaculum was applied to cervix with tension to straighten cervical canal. Uterus sounded to 7 cm.  The IUD was inserted into the uterus without difficulty.  The strings were trimmed 2 cm below the cervix. The patient tolerated the procedure well without any complications.      EBL: minimal  Complications: No  Tolerance: Pt tolerated procedure well and was in stable condition.     ASSESSMENT AND PLAN:  Encounter for IUD removal and reinsertion  Mirena IUD was removed and reinserted today. Pt was instructed to call if heavy bleeding, severe cramping or foul smelling discharge. May take 400-600 mg ibuprofen TID prn for mild cramping. Pt instructed she requires a new IUD device in 5-8 years.   - HCG qualitative urine  - levonorgestrel (MIRENA) 52 MG (20 mcg/day) IUD 1 each  - INSERTION INTRAUTERINE DEVICE  - REMOVE INTRAUTERINE DEVICE      Natividad Maya MD  [facility]

## 2025-01-23 ENCOUNTER — OFFICE VISIT (OUTPATIENT)
Dept: FAMILY MEDICINE | Facility: CLINIC | Age: 45
End: 2025-01-23
Payer: COMMERCIAL

## 2025-01-23 VITALS
TEMPERATURE: 98.6 F | HEIGHT: 60 IN | HEART RATE: 73 BPM | RESPIRATION RATE: 16 BRPM | OXYGEN SATURATION: 100 % | BODY MASS INDEX: 48.49 KG/M2 | SYSTOLIC BLOOD PRESSURE: 109 MMHG | WEIGHT: 247 LBS | DIASTOLIC BLOOD PRESSURE: 72 MMHG

## 2025-01-23 DIAGNOSIS — Z30.433 ENCOUNTER FOR IUD REMOVAL AND REINSERTION: Primary | ICD-10-CM

## 2025-01-23 PROBLEM — Z97.5 IUD (INTRAUTERINE DEVICE) IN PLACE: Status: ACTIVE | Noted: 2025-01-23

## 2025-01-23 LAB — HCG UR QL: NEGATIVE

## 2025-01-23 ASSESSMENT — PATIENT HEALTH QUESTIONNAIRE - PHQ9
SUM OF ALL RESPONSES TO PHQ QUESTIONS 1-9: 1
SUM OF ALL RESPONSES TO PHQ QUESTIONS 1-9: 1
10. IF YOU CHECKED OFF ANY PROBLEMS, HOW DIFFICULT HAVE THESE PROBLEMS MADE IT FOR YOU TO DO YOUR WORK, TAKE CARE OF THINGS AT HOME, OR GET ALONG WITH OTHER PEOPLE: NOT DIFFICULT AT ALL

## 2025-01-29 SDOH — HEALTH STABILITY: PHYSICAL HEALTH: ON AVERAGE, HOW MANY DAYS PER WEEK DO YOU ENGAGE IN MODERATE TO STRENUOUS EXERCISE (LIKE A BRISK WALK)?: 2 DAYS

## 2025-01-29 SDOH — HEALTH STABILITY: PHYSICAL HEALTH: ON AVERAGE, HOW MANY MINUTES DO YOU ENGAGE IN EXERCISE AT THIS LEVEL?: 20 MIN

## 2025-01-29 ASSESSMENT — SOCIAL DETERMINANTS OF HEALTH (SDOH): HOW OFTEN DO YOU GET TOGETHER WITH FRIENDS OR RELATIVES?: ONCE A WEEK

## 2025-02-03 ENCOUNTER — OFFICE VISIT (OUTPATIENT)
Dept: FAMILY MEDICINE | Facility: CLINIC | Age: 45
End: 2025-02-03
Attending: NURSE PRACTITIONER
Payer: COMMERCIAL

## 2025-02-03 DIAGNOSIS — E55.9 VITAMIN D DEFICIENCY: ICD-10-CM

## 2025-02-03 DIAGNOSIS — Z15.89 MTHFR MUTATION: ICD-10-CM

## 2025-02-03 DIAGNOSIS — N95.1 MENOPAUSAL SYNDROME (HOT FLASHES): ICD-10-CM

## 2025-02-03 DIAGNOSIS — S00.83XA CONTUSION OF FACE, INITIAL ENCOUNTER: ICD-10-CM

## 2025-02-03 DIAGNOSIS — Z00.00 ROUTINE PHYSICAL EXAMINATION: Primary | ICD-10-CM

## 2025-02-03 DIAGNOSIS — R73.03 PRE-DIABETES: ICD-10-CM

## 2025-02-03 DIAGNOSIS — E78.5 HYPERLIPIDEMIA WITH TARGET LDL LESS THAN 160: ICD-10-CM

## 2025-02-03 DIAGNOSIS — E03.9 HYPOTHYROIDISM, UNSPECIFIED TYPE: ICD-10-CM

## 2025-02-03 DIAGNOSIS — Z98.84 S/P BARIATRIC SURGERY: ICD-10-CM

## 2025-02-03 DIAGNOSIS — R79.89 ELEVATED TSH: ICD-10-CM

## 2025-02-03 DIAGNOSIS — E21.3 HYPERPARATHYROIDISM: ICD-10-CM

## 2025-02-03 DIAGNOSIS — E28.2 PCOS (POLYCYSTIC OVARIAN SYNDROME): ICD-10-CM

## 2025-02-03 DIAGNOSIS — F41.9 ANXIETY: ICD-10-CM

## 2025-02-03 DIAGNOSIS — E88.810 METABOLIC SYNDROME: ICD-10-CM

## 2025-02-03 DIAGNOSIS — D64.9 ANEMIA, UNSPECIFIED TYPE: ICD-10-CM

## 2025-02-03 DIAGNOSIS — R79.89 LOW VITAMIN B12 LEVEL: ICD-10-CM

## 2025-02-03 DIAGNOSIS — E66.01 MORBID OBESITY (H): ICD-10-CM

## 2025-02-03 LAB
CHOLEST SERPL-MCNC: 185 MG/DL
ERYTHROCYTE [DISTWIDTH] IN BLOOD BY AUTOMATED COUNT: 15.5 % (ref 10–15)
EST. AVERAGE GLUCOSE BLD GHB EST-MCNC: 111 MG/DL
FASTING STATUS PATIENT QL REPORTED: ABNORMAL
FASTING STATUS PATIENT QL REPORTED: NORMAL
FERRITIN SERPL-MCNC: 8 NG/ML (ref 6–175)
GLUCOSE SERPL-MCNC: 87 MG/DL (ref 70–99)
HBA1C MFR BLD: 5.5 % (ref 0–5.6)
HCT VFR BLD AUTO: 33.1 % (ref 35–47)
HDLC SERPL-MCNC: 59 MG/DL
HGB BLD-MCNC: 10.3 G/DL (ref 11.7–15.7)
LDLC SERPL CALC-MCNC: 112 MG/DL
MCH RBC QN AUTO: 23.8 PG (ref 26.5–33)
MCHC RBC AUTO-ENTMCNC: 31.1 G/DL (ref 31.5–36.5)
MCV RBC AUTO: 76 FL (ref 78–100)
NONHDLC SERPL-MCNC: 126 MG/DL
PLATELET # BLD AUTO: 328 10E3/UL (ref 150–450)
RBC # BLD AUTO: 4.33 10E6/UL (ref 3.8–5.2)
T3FREE SERPL-MCNC: 2.5 PG/ML (ref 2–4.4)
T4 FREE SERPL-MCNC: 1.2 NG/DL (ref 0.9–1.7)
TRIGL SERPL-MCNC: 68 MG/DL
TSH SERPL DL<=0.005 MIU/L-ACNC: 3.22 UIU/ML (ref 0.3–4.2)
WBC # BLD AUTO: 6.3 10E3/UL (ref 4–11)

## 2025-02-03 PROCEDURE — 82947 ASSAY GLUCOSE BLOOD QUANT: CPT | Performed by: FAMILY MEDICINE

## 2025-02-03 PROCEDURE — 83036 HEMOGLOBIN GLYCOSYLATED A1C: CPT | Performed by: FAMILY MEDICINE

## 2025-02-03 PROCEDURE — 80061 LIPID PANEL: CPT | Performed by: FAMILY MEDICINE

## 2025-02-03 RX ORDER — GABAPENTIN 100 MG/1
100 CAPSULE ORAL AT BEDTIME
Qty: 60 CAPSULE | Refills: 4 | Status: SHIPPED | OUTPATIENT
Start: 2025-02-03

## 2025-02-03 ASSESSMENT — PATIENT HEALTH QUESTIONNAIRE - PHQ9
SUM OF ALL RESPONSES TO PHQ QUESTIONS 1-9: 1
10. IF YOU CHECKED OFF ANY PROBLEMS, HOW DIFFICULT HAVE THESE PROBLEMS MADE IT FOR YOU TO DO YOUR WORK, TAKE CARE OF THINGS AT HOME, OR GET ALONG WITH OTHER PEOPLE: NOT DIFFICULT AT ALL
SUM OF ALL RESPONSES TO PHQ QUESTIONS 1-9: 1

## 2025-02-03 NOTE — PROGRESS NOTES
"Preventive Care Visit  [facility]  Keiry Barrientos MD, Family Medicine  Feb 3, 2025  {Provider  Link to [facility] :223267}    Assessment & Plan     Routine physical examination  ***  - Lipid panel reflex to direct LDL Fasting; Future  - Hemoglobin A1c; Future  - Glucose; Future    PCOS (polycystic ovarian syndrome)  ***  - Lipid panel reflex to direct LDL Fasting; Future  - Hemoglobin A1c; Future  - Glucose; Future    Metabolic syndrome  ***    Hyperlipidemia with target LDL less than 160  ***  - Lipid panel reflex to direct LDL Fasting; Future  - Lipid panel reflex to direct LDL Fasting; Future    Hyperparathyroidism  ***    Hypothyroidism, unspecified type  ***    Morbid obesity (H)  ***    Vitamin D deficiency  ***    Pre-diabetes  ***  - Hemoglobin A1c; Future  - Glucose; Future    MTHFR mutation  ***    Low vitamin B12 level  ***    Anxiety  ***  - FLUoxetine (PROZAC) 20 MG capsule; Take 1 capsule (20 mg) by mouth daily.    S/P bariatric surgery  ***    Menopausal syndrome (hot flashes)  ***  - gabapentin (NEURONTIN) 100 MG capsule; Take 1 capsule (100 mg) by mouth at bedtime.    Contusion of face, initial encounter  I suspect she has mild contusion related to injury.  No red flag symptoms.  Reviewed red flag symptoms, notify us with any sort of worsening, otherwise may allow additional time.    {Patient advised of split billing (Optional):250972}        BMI  Estimated body mass index is 48.16 kg/m  as calculated from the following:    Height as of 1/23/25: 1.525 m (5' 0.05\").    Weight as of 1/23/25: 112 kg (247 lb).   {Weight Management Plan needed for ACO:474733}    Counseling  Appropriate preventive services were addressed with this patient via screening, questionnaire, or discussion as appropriate for fall prevention, nutrition, physical activity, Tobacco-use cessation, social engagement, weight loss and cognition.  Checklist reviewing preventive services available has been given to " the patient.  Reviewed patient's diet, addressing concerns and/or questions.   She is at risk for lack of exercise and has been provided with information to increase physical activity for the benefit of her well-being.       {FOLLOW UP PLANS (Optional) Includes COVID19 Treatment Plan:273683}    Taya Lynn is a 44 year old, presenting for the following:  Physical (Fasting, no pap? Follow up due in oct 2025) and Right cheek bone pain (Hit cheekbone with car door a week and a half ago, skin feel's sensitive and mild discomfort when she shuts eye tight)    {(!) Visit Details have not yet been documented.  Please enter Visit Details and then use this list to pull in documentation. (Optional):178412}       HPI  ***  {MA/LPN/RN Pre-Provider Visit Orders- hCG/UA/Strep (Optional):943412}  {SUPERLIST (Optional):212413}  {additonal problems for provider to add (Optional):441116}  Health Care Directive  Patient does not have a Health Care Directive: Discussed advance care planning with patient; information given to patient to review.      1/29/2025   General Health   How would you rate your overall physical health? Good   Feel stress (tense, anxious, or unable to sleep) Only a little   (!) STRESS CONCERN      1/29/2025   Nutrition   Three or more servings of calcium each day? Yes   Diet: Regular (no restrictions)   How many servings of fruit and vegetables per day? (!) 2-3   How many sweetened beverages each day? 0-1         1/29/2025   Exercise   Days per week of moderate/strenous exercise 2 days   Average minutes spent exercising at this level 20 min   (!) EXERCISE CONCERN      1/29/2025   Social Factors   Frequency of gathering with friends or relatives Once a week   Worry food won't last until get money to buy more No   Food not last or not have enough money for food? No   Do you have housing? (Housing is defined as stable permanent housing and does not include staying ouside in a car, in a tent, in an abandoned  building, in an overnight shelter, or couch-surfing.) Yes   Are you worried about losing your housing? No   Lack of transportation? No   Unable to get utilities (heat,electricity)? No         1/29/2025   Dental   Dentist two times every year? Yes         1/29/2025   TB Screening   Were you born outside of the [place]? No       Today's PHQ-9 Score:       2/3/2025     8:28 AM   PHQ-9 SCORE   PHQ-9 Total Score MyChart 1 (Minimal depression)   PHQ-9 Total Score 1        Patient-reported         1/29/2025   Substance Use   Alcohol more than 3/day or more than 7/wk No   Do you use any other substances recreationally? No     Social History     Tobacco Use     Smoking status: Never     Passive exposure: Never     Smokeless tobacco: Never   Vaping Use     Vaping status: Never Used   Substance Use Topics     Alcohol use: Yes     Alcohol/week: 2.5 standard drinks of alcohol     Types: 3 Standard drinks or equivalent per week     Drug use: No     {Provider  If there are gaps in the social history shown above, please follow the link to update and then refresh the note Link to Social and Substance History :689580}        1/29/2025   Breast Cancer Screening   Family history of breast, colon, or ovarian cancer? No / Unknown         11/19/2024   LAST FHS-7 RESULTS   1st degree relative breast or ovarian cancer No    No    No    No   Any relative bilateral breast cancer No    No    No    No   Any male have breast cancer No    No    No    No   Any ONE woman have BOTH breast AND ovarian cancer No    No    No    No   Any woman with breast cancer before 50yrs No    No    No    No   2 or more relatives with breast AND/OR ovarian cancer No    No    No    No   2 or more relatives with breast AND/OR bowel cancer No    No    No    No       Multiple values from one day are sorted in reverse-chronological order     {If any of the questions to the FHS7 are answered yes, consider referral for genetic counseling.    Additional indications for genetic  referral include personal history of breast or ovarian cancer, genetic mutation in 1st degree relative which increases risk of breast cancer including BRCA1, BRCA2, ZBIGNIEW, PALB 2, TP53, CHEK2, PTEN, CDH1, STK11 (per ACS) and/or 1st degree relative with history of pancreatic or high-risk prostate cancer (per NCCN):090914}   {Mammogram Decision Support (Optional):642761}        1/29/2025   STI Screening   New sexual partner(s) since last STI/HIV test? No     History of abnormal Pap smear: { :948625}        Latest Ref Rng & Units 10/31/2022     7:48 AM 8/17/2021     7:53 AM 12/29/2016     2:17 PM   PAP / HPV   PAP  Negative for Intraepithelial Lesion or Malignancy (NILM)  Negative for Intraepithelial Lesion or Malignancy (NILM)  Negative for squamous intraepithelial lesion or malignancy  Electronically signed by Suzy Flanagan CT (ASCP) on 1/6/2017 at 12:46 PM      HPV 16 DNA Negative Negative  Negative     HPV 18 DNA Negative Negative  Negative     Other HR HPV Negative Negative  Positive       ASCVD Risk   The 10-year ASCVD risk score (Zan KINCAID, et al., 2019) is: 0.4%    Values used to calculate the score:      Age: 44 years      Sex: Female      Is Non- : No      Diabetic: No      Tobacco smoker: No      Systolic Blood Pressure: 109 mmHg      Is BP treated: No      HDL Cholesterol: 64 mg/dL      Total Cholesterol: 199 mg/dL        1/29/2025   Contraception/Family Planning   Questions about contraception or family planning No     {Provider  REQUIRED FOR AWV Use the storyboard to review patient history, after sections have been marked as reviewed, refresh note to capture documentation:320393}   Reviewed and updated as needed this visit by Provider                    {HISTORY OPTIONS (Optional):633302}    {ROS Picklists (Optional):934045}     Objective    Exam  LMP 07/20/2017 (Approximate)    Estimated body mass index is 48.16 kg/m  as calculated from the following:    Height as of  "1/23/25: 1.525 m (5' 0.05\").    Weight as of 1/23/25: 112 kg (247 lb).    Physical Exam  {Exam Choices (Optional):833372}        Signed Electronically by: Keiry Barrientos MD  {Email feedback regarding this note to [email or web address]   :510291}  "

## 2025-02-03 NOTE — PATIENT INSTRUCTIONS
I suspect you have some mild pressure on or injury to the nerve of your face.  I recommend allowing additional time.  If you develop sinus congestion, vision changes, double vision, difficulty with speech or swallowing, be sure to let me know.  Patient Education   Preventive Care Advice   This is general advice given by our system to help you stay healthy. However, your care team may have specific advice just for you. Please talk to your care team about your preventive care needs.  Nutrition  Eat 5 or more servings of fruits and vegetables each day.  Try wheat bread, brown rice and whole grain pasta (instead of white bread, rice, and pasta).  Get enough calcium and vitamin D. Check the label on foods and aim for 100% of the RDA (recommended daily allowance).  Lifestyle  Exercise at least 150 minutes each week  (30 minutes a day, 5 days a week).  Do muscle strengthening activities 2 days a week. These help control your weight and prevent disease.  No smoking.  Wear sunscreen to prevent skin cancer.  Have a dental exam and cleaning every 6 months.  Yearly exams  See your health care team every year to talk about:  Any changes in your health.  Any medicines your care team has prescribed.  Preventive care, family planning, and ways to prevent chronic diseases.  Shots (vaccines)   HPV shots (up to age 26), if you've never had them before.  Hepatitis B shots (up to age 59), if you've never had them before.  COVID-19 shot: Get this shot when it's due.  Flu shot: Get a flu shot every year.  Tetanus shot: Get a tetanus shot every 10 years.  Pneumococcal, hepatitis A, and RSV shots: Ask your care team if you need these based on your risk.  Shingles shot (for age 50 and up)  General health tests  Diabetes screening:  Starting at age 35, Get screened for diabetes at least every 3 years.  If you are younger than age 35, ask your care team if you should be screened for diabetes.  Cholesterol test: At age 39, start having a  cholesterol test every 5 years, or more often if advised.  Bone density scan (DEXA): At age 50, ask your care team if you should have this scan for osteoporosis (brittle bones).  Hepatitis C: Get tested at least once in your life.  STIs (sexually transmitted infections)  Before age 24: Ask your care team if you should be screened for STIs.  After age 24: Get screened for STIs if you're at risk. You are at risk for STIs (including HIV) if:  You are sexually active with more than one person.  You don't use condoms every time.  You or a partner was diagnosed with a sexually transmitted infection.  If you are at risk for HIV, ask about PrEP medicine to prevent HIV.  Get tested for HIV at least once in your life, whether you are at risk for HIV or not.  Cancer screening tests  Cervical cancer screening: If you have a cervix, begin getting regular cervical cancer screening tests starting at age 21.  Breast cancer scan (mammogram): If you've ever had breasts, begin having regular mammograms starting at age 40. This is a scan to check for breast cancer.  Colon cancer screening: It is important to start screening for colon cancer at age 45.  Have a colonoscopy test every 10 years (or more often if you're at risk) Or, ask your provider about stool tests like a FIT test every year or Cologuard test every 3 years.  To learn more about your testing options, visit:   .  For help making a decision, visit:   https://bit.ly/tf96254.  Prostate cancer screening test: If you have a prostate, ask your care team if a prostate cancer screening test (PSA) at age 55 is right for you.  Lung cancer screening: If you are a current or former smoker ages 50 to 80, ask your care team if ongoing lung cancer screenings are right for you.  For informational purposes only. Not to replace the advice of your health care provider. Copyright   2023 [employer]. All rights reserved. Clinically reviewed by the [facility]  [facility]. [employer] 354003 - REV 01/24.

## 2025-02-05 DIAGNOSIS — D50.9 IRON DEFICIENCY ANEMIA, UNSPECIFIED IRON DEFICIENCY ANEMIA TYPE: ICD-10-CM

## 2025-02-05 DIAGNOSIS — K91.2 POSTOPERATIVE MALABSORPTION: Primary | ICD-10-CM

## 2025-02-05 RX ORDER — BACILLUS COAGULANS 1B CELL
1 CAPSULE ORAL DAILY
Qty: 60 TABLET | Refills: 0 | Status: SHIPPED | OUTPATIENT
Start: 2025-02-05 | End: 2025-04-06

## 2025-03-03 ENCOUNTER — E-VISIT (OUTPATIENT)
Dept: FAMILY MEDICINE | Facility: CLINIC | Age: 45
End: 2025-03-03
Payer: COMMERCIAL

## 2025-03-03 DIAGNOSIS — M54.40 ACUTE LOW BACK PAIN WITH SCIATICA, SCIATICA LATERALITY UNSPECIFIED, UNSPECIFIED BACK PAIN LATERALITY: Primary | ICD-10-CM

## 2025-03-03 PROCEDURE — 99207 PR NON-BILLABLE SERV PER CHARTING: CPT | Performed by: FAMILY MEDICINE

## 2025-03-04 NOTE — PATIENT INSTRUCTIONS
Thank you for choosing us for your care. I think an in-clinic or virtual visit would be the best next step based on your symptoms. Please schedule a clinic appointment; you won t be charged for this eVisit.  If your discomfort is significant, you could consider evaluation in urgent care.    You can schedule an appointment by clicking here in [employer], or call [phone].

## 2025-06-05 ENCOUNTER — VIRTUAL VISIT (OUTPATIENT)
Dept: SURGERY | Facility: CLINIC | Age: 45
End: 2025-06-05
Attending: EMERGENCY MEDICINE
Payer: COMMERCIAL

## 2025-06-05 VITALS — HEIGHT: 60 IN | BODY MASS INDEX: 46.33 KG/M2 | WEIGHT: 236 LBS

## 2025-06-05 DIAGNOSIS — E66.813 CLASS 3 SEVERE OBESITY DUE TO EXCESS CALORIES WITH SERIOUS COMORBIDITY AND BODY MASS INDEX (BMI) OF 45.0 TO 49.9 IN ADULT (H): ICD-10-CM

## 2025-06-05 DIAGNOSIS — Z98.84 HX OF GASTRIC BYPASS: ICD-10-CM

## 2025-06-05 ASSESSMENT — PAIN SCALES - GENERAL: PAINLEVEL_OUTOF10: MILD PAIN (1)

## 2025-06-05 NOTE — PATIENT INSTRUCTIONS
Plan:  Increase Zepbound to 12.5mg/week for 2 months then increase to top dose of 15mg/week if tolerated. If too strong, we'll drop back and hold at this 10mg/week dose that you tolerate well but has lower than average efficacy for you. Stop Zepbound if severe abdominal pain/vomiting/rash/throat swelling or other severe intolerance.    2. Check labs at our next visit. Continue Adarsh Life Just One.     3. Continue to de-stress with some recreation/walking/roller blading/swimming/yard care. 20 minutes most days of the week would be ideal.  Longer is better/fine but even 10 minutes/day makes a difference in stress hormones/sleep.     4. Follow up in 4-5 months for annual review/weight check visit. Hit your 70-80 grams of lean protein daily to keep muscle mass intact. Supplement as needed with your Fairlife shake.         LEAN PROTEIN SOURCES  Getting 20-30 grams of protein, 3 meals daily, is appropriate for most people, some need more but more than about 40 grams per meal is not useful.  General rule is drinking one ounce of water per gram of protein eaten over the course of the day:  70 grams of protein each day, drink 70 oz of water.  Protein Source Portion Calories Grams of Protein                           Nonfat, plain Greek yogurt    (10 grams sugar or less) 3/4 cup (6 oz)  12-17   Light Yogurt (10 grams sugar or less) 3/4 cup (6 oz)  6-8   Protein Shake 1 shake 110-180 15-30   Skim/1% Milk or lactose-free milk 1 cup ( 8 oz)  8   Plain or light, flavored soymilk 1 cup  7-8   Plain or light, hemp milk 1 cup 110 6   Fat Free or 1% Cottage Cheese 1/2 cup 90 15   Part skim ricotta cheese 1/2 cup 100 14   Part skim or reduced fat cheese slices 1 ounce 65-80 8     Mozzarella String Cheese 1 80 8   Canned tuna, chicken, crab or salmon  (canned in water)  1/2 cup 100 15-20   White fish (broiled, grilled, baked) 3 ounces 100 21   [place]/Tuna (broiled, grilled, baked) 3 ounces 150-180 21   Shrimp,  Scallops, Lobster, Crab 3 ounces 100 21   Pork loin, Pork Tenderloin 3 ounces 150 21   Boneless, skinless chicken /turkey breast                          (broiled, grilled, baked) 3 ounces 120 21   [place], Alcorn, [place], and Venison 3 ounces 120 21   Lean cuts of red meat and pork (sirloin,   round, tenderloin, flank, ground 93%-96%) 3 ounces 170 21   Lean or Extra Lean Ground Turkey 1/2 cup 150 20   90-95% Lean [place] Burger 1 david 140-180 21   Low-fat casserole with lean meat 3/4 cup 200 17   Luncheon Meats                                                        (turkey, lean ham, roast beef, chicken) 3 ounces 100 21   Egg (boiled, poached, scrambled) 1 Egg 60 7   Egg Substitute 1/2 cup 70 10   Nuts (limit to 1 serving per day)  3 Tbsp. 150 7   Nut [place] (peanut, almond)  Limit to 1 serving or less daily 1 Tbsp. 90 4   Soy Burger (varies) 1  15   Garbanzo, Black, [place] Beans 1/2 cup 110 7   Refried Beans 1/2 cup 100 7   Kidney and [place] beans 1/2 cup 110 7   Tempeh 3 oz 175 18   Vegan crumbles 1/2 cup 100 14   Tofu 1/2 cup 110 14   Chili (beans and extra lean beef or turkey) 1 cup 200 23   Lentil Stew/Soup 1 cup 150 12   Black Bean Soup 1 cup 175 12         Zepbound/Mounjaro (Tirzepatide) is a very effective satiety boosting appetite suppressant that elevates satiety hormones GLP1 and GIP. It needs to be ramped up slowly to be tolerated adequately.  It helps release insulin in response to food when blood sugar runs higher than normal and is very helpful for diabetics in managing blood sugar levels with low risks for any low blood sugars.  About 1/10 people will not tolerate this medication. Each month, you move up to a higher dose until eventually reaching the 10mg/week dose if tolerated with further ramping to follow if needed. If intolerant or severe side effects, a dose decrease would be wise, so keep me posted if not tolerated the ramping well. This may be a longer term medication based on individual  needs/physiology and appetite control.     Injections can be given after cleansing the skin with alcohol prep pad or swab (available OTC).     Stop Zepbound if severe abdominal pain/vomiting/rash/throat swelling or constant nausea that prevents adequate food/water intake. Stop 2-3 weeks prior to any planned general anesthesia surgeries to reduce risk for something called a post operative ileus.     Gallstones can occur in about 1% of patients on this medication so update me if increase right upper abdominal pain after eating.     Start meals with protein first, separate beverages from meals by 20 minutes and work hard in between meals to get your 64-75 oz of water daily to reduce risks for severe constipation. Consider a fiber supplement like powdered psyllium husk in 12 oz water each night, stool softerners as needed and Miralax or milk of Magnesia if more than 3 days have passed without a Bowel Movement. Some other options include:  For Prevention and Treatment of Constipation when on Semaglutide     From least aggressive to most aggressive:     Move: [place] is essential - the more we move, the more our bowels move  Water: Drink water - 64oz or more a day  Go when you need to go. Don't wait. The longer you wait, the harder it gets.  Fiber: Fruit, raw veggies, nuts, whole grains, prune each night, flax/kevon seeds added into meals can all be helpful.   Stool Softeners: if constipation is mild and for maintenance  Gentle laxatives: Miralax, senokot, dulcolax , Smooth move tea as needed     More aggressive (and typically won't get to this point)  Milk of Magnesia  Mag Citrate (what you drink before a colonoscopy)  Suppositories  Enema      Check out MustHaveMenus for patient resources.  If you have weekends off, I recommend dosing Friday evenings.     Some people starve on this medication if not mindful about food intake. I recommend starting meals with the protein part of your meal first, chew thoroughly and  separate beverages from meals by about 20 minutes to make sure you get your nourishment in first. Include vegetables/complex carbohydrates and unsaturated fat as part of your balanced diet but group these at the end of the meal, after your protein is mostly gone. Satiety will kick in too early if drinking too much with meals and under-nourishment can result.     It's not a bad idea to take a complete multivitamin most days of the week if using this medication. Lower iron content tends to be less constipating.     Adequate hydration is essential for feeling your best, efficient fat burning, waste elimination and constipation prevention. For those without fluid restrictions due to other disease, the goal is at least one ounce of water per gram of protein consumed with a  minimum of 64oz/day goal.     Pancreatitis is a very rare but potentially serious side effect. Stop Zepbound if severe mid abdominal pain/burning in nature or if unable to eat/drink due to severe nausea/discomfort.   People with strong history of pancreatitis without clear cause should stay clear of this medication as should those planning to get pregnant, those with strong personal or family history for medullary thyroid cancer or Multiple Endocrine Neoplasia (rare).     Stop Zepbound at least 2 weeks prior to any planned surgery.  Stop until fully recovered if unexpected/emergent surgery is needed with anticipation that re-ramping will be needed if off longer than 14-16 days since last dose.    Kind Regards,  Manoj Stover MD  [facility]  On-the-Go Breakfast Ideas  As of 2015, the latest research shows what a huge impact eating breakfast has on losing weight and feeling your best. People lose more weight when they make breakfast their biggest meal of the day compared to Dinner, but even if you cannot go to that degree, getting a breakfast that has at least 20 grams of protein and even a moderate amount of fat  is ideal for maintaining good energy through the day and limits overeating in the evening hours.  The following are some quick and easy suggestions for at least getting something of substance into your body in the morning.  Enjoy!    Eating breakfast within 90 minutes of waking up is an important part of taking care of your body on a restricted calorie diet plan.  After sleeping for hours, your body is in need of fuel.  An ideal breakfast is a combination of protein, whole-grain carbohydrates, or fruit.  Here s why:    -Protein digests very slowly in the body, helping you feel more satisfied.  -Whole grains provide dietary fiber, which also digests slowly and helps keep your gut clean.  -Fruit is a great source of vitamins, minerals, and fiber.     Each one of these breakfast combinations has between 200-300 calories and 15-20 grams of protein.  Feel free to mix and match!    Bone Broth (chicken bone broth or beef bone broth) is a great way to boost protein content. 8oz of bone broth will typically have 9-12grams of protein for 40kcal of energy.    Protein: Choose  -1/2 cup low-fat cottage cheese  -2 hard boiled eggs , or one cooked in olive oil (low/slow heat).  -1 low fat string cheese stick  -1 Xumiion natural peanut butter  -Rong360 vegetarian sausage david (found in freezer section)  -1 slice lowfat cheese  -6 oz 2% or lowfat Greek yogurt, such as Fage or Oikos.    PLUS    Whole Grains:  Choose   -1 whole wheat English muffin  -1 whole wheat dinorah, half  -1/2 Fiber One frozen muffin, thawed  -1/2 Fiber One toaster pastry  -1 whole wheat bagel thin  -1/2 cup Kashi cereal  -1 Kashi waffle (or other whole grain high-fiber waffle)  Aim for whole grain/sprouted breads with at least 3g of fiber/slice if having bread. [place] is one such brand.    OR    Fruit: Choose  -1/3 cup blueberries  -1/2 banana (or a plantain- similar to a banana, yet smaller)  -1/2 cup cantaloupe cubes  -1 small apple  -1  small orange  -1/2 cup strawberries  -handful raspberries/blackberries (each berry is about 1 calorie).    *Adapted from Diabetes Living, Fall 20    Ten Breakfasts Under 250 calories    Ideally, getting between 350-600 calories  (depending on starting height and weight)for breakfast is ideal for avoiding hunger later in the day, adjust/add to the following accordingly:    One- 250 calories, 8.5 g protein  1 slice whole-grain toast   1 Tbsp peanut butter    banana    Two- 250 calories, 8 g protein    cup nonfat/lowfat yogurt  1/3rd cup diced no-sugar peaches  1/3rd cup cereal (like Special K, Cheerios, or bran flakes)    Three- 250 calories, 25 g protein  1 egg scrambled with 1 oz skim milk    cup shredded cheddar    whole grain English muffin  1 oz [place] varma  1 tsp margarine spread    Four- 225 calories, 25 g protein  1/2 cup Kashi Go-Lean cereal    cup skim milk mixed with 1 scoop Bariatric Advantage protein powder    cup no-sugar diced pears    Five- 250 calories, 20 g protein    cup oatmeal prepared with skim milk, 1 scoop protein powder, and sugar-free maple syrup    Six- 200 calories, 5 g protein  1 whole grain waffle, toasted  1 tablespoon creamy peanut or almond butter    Seven-  250 calories, 19 g protein  Breakfast sandwich: 1 slice whole grain toast, cut in half.  Add 1 scrambled egg and one slice cheddar  cheese.    Eight-  250 calories, 15 g protein  2 eggs scrambled with 1/3 cup frozen spinach (heat before adding to eggs) and 2 tablespoons low fat cream cheese.    Nine-  150 calories, 15 g protein  2/3rd cup cottage cheese    cup cantaloupe    Ten- 200 calories, 20 g protein  Fruit smoothie made with 4 oz. nonfat Greek yogurt,   cup berries, 1 scoop protein powder, and 4 oz skim milk.    Ten Lunches Under 250 Calories    Aim for lunch to be around 300-400 calories a day when trying to lose weight and get that protein in!    One- 200 calories, 11 g protein  1/3 cup tuna salad made with light mccormick on  1 slice whole grain bread  1 small peeled apple    Two- 250 calories, 16 g protein  1/3 cup lowfat cottage cheese    cup cooked green beans    small fruit cocktail (in natural juice)    Three- 200 calories, 11 g protein    grilled cheese sandwich on whole grain bread with lowfat cheese  2/3rd cup of tomato soup    Four- 250 calories, 22 g protein  Deli wrap: 1 oz sliced turkey, 1 oz sliced ham, 1 oz sliced chicken rolled up with 1 slice low-fat cheese  1 small orange    Five- 250 calories, 28 g protein  2/3rd cup chili with 1 oz shredded cheese  4 saltine crackers    Six- 250 calories, 22 g protein  1 cup fresh spinach with 2 oz chicken, 1/3rd cup mandarin oranges, and 2 tablespoons sliced almonds with 1 tablespoon  vinaigrette dressing    Seven- 200 calories, 11 g protein  1 Tbsp sugar-free preserves and 1 Tbsp peanut butter on 1 slice whole grain toast    cup nonfat/lowfat Greek yogurt    Eight- 250 calories, 18 g protein  1 small soft-shell chicken taco with 1 oz shredded cheese, lettuce, tomato, salsa, and 1 Tbsp light sour cream    cup black beans    Nine- 225 calories, 13 g protein  2 ounces baked chicken  1/4 cup mashed potatoes    cup green beans    Ten- 200 calories, 21 g protein  Deli dinorah: 2 oz roast beef or other deli meat with 1 tsp Javi mayonnaise and sliced tomato, onion, and lettuce  1/3rd cup cottage cheese      Ten Dinners Under 300 calories    If you're eating a large breakfast and medium lunch, keep dinner small.  300-400 calories is ideal for most people depending on their caloric needs.    One- 300 calories, 12 g protein  1-inch thick slice of turkey meatloaf    cup baked butternut squash    Two- 200 calories, 9 g protein  Bread-less BLT: 3 slices turkey varma, sliced tomato, wrapped in a large lettuce leaf    cup peeled fruit    Three- 275 calories, 36 g protein  3 oz roasted chicken    cup cooked broccoli    cup shredded cheddar cheese    cup unsweetened applesauce    Four- 200 calories, 25  g protein  3 oz baked tilapia  1/3rd cup cooked carrots    cup yogurt    Five- 250 calories, 20 g protein  Grilled ham  n  Swiss: spread 2 tsp ghee or butter on 1 slice of whole grain bread.  Cut bread in half, layer 2 oz deli ham with 1 piece of Swiss cheese and grill until cheese is melted.    cup cooked vegetables    Six- 250 calories, 18 g protein  Vegetarian cheeseburger: 1 [place] cheeseburger topped with lettuce, onion, tomato, and ketchup/mustard    cup sweet potato fries    Seven- 250 calories, 18 g protein  Pork pot roast: 2 oz roasted pork loin, 1/3rd cup roasted carrots,   medium potato, cooked with   cup gravy    Eight- 330 calories, 25 g protein  2 oz meatballs (about 2 small meatballs)    cup spaghetti sauce  1/2 piece toast topped with 1 tsp ghee or butterand topped with garlic powder, toasted in oven    Nine- 250 calories, 16 g protein  Mexican pizza: one 8  corn tortilla topped with 2 oz chicken,   cup salsa, 2 tablespoons black beans, 2 tablespoons shredded cheese.  Bake until cheese is melted.    Ten- 250 calories, 22 g protein  Shrimp stir-reid: 3 oz cooked shrimp, 1/6th onion,   pepper,   cup chopped carrots sautéed in 1 tablespoon olive oil, topped with 2 tablespoons stir reid sauce and a pinch of sesame seeds        150 Calories or Less Snack Ideas   1 hardboiled egg with   cup berries  1 small apple with 1 hardboiled egg  10 almonds with   cup berries  2 clementines with 1 light string cheese  1 light string cheese with   sliced apple  1 light string cheese wrapped in 2 slices of turkey  4 100% whole wheat crackers (e.g. Triscuit) with 1 light string cheese    c. cottage cheese with   cup fruit and 1 Tbsp sunflower seeds     cup cottage cheese with   of an avocado     can tuna fish with 1 cup sliced cucumbers     cup roasted garbanzo beans with paprika and cayenne pepper    baked sweet potato with   cup chili beans or   cup cottage cheese  2 oz. nitrate free turkey slices with 1 cup carrots  1  container (6 oz) of low sugar (less than 10 grams of sugar) greek yogurt   3 Tablespoons of hummus with 1 cup sliced bell peppers   2 Tablespoons of hummus with 15 baby carrots  4 Tablespoons ranch dip made with plain Greek Yogurt and 3 mini cucumbers  1/4 cup nuts (any kind)  1 Tablespoon peanut butter with 1 stalk celery   1 dill pickle wrapped in 1-2 slices of deli ham with 1 tsp of mayonnaise/mustard.      [facility]  Nutritional Guidelines  Gastric Bypass 18 Months Post Op and Beyond    General Guidelines and Helpful Hints:  Eat 3 meals per day + protein supplement(s). No snacks between meals.  Do not skip meals.  This can cause overeating at the next meal and will prevent adequate protein and nutritional intake.  Aim for 60-80 grams of protein per day.  Always eat your protein first. This assists with optimal nutrition and helps you stay full longer.  Depending on your portion size, you may need to drink approved protein supplement between meals to achieve protein goals. Follow recommendations of your Dietitian.   Eat your protein first, and then follow with fiber.   It is not necessary to count your fiber, but 15-20 grams per day is recommended.    Add fiber by including fruits, vegetables, whole grains, and beans.   Portions should remain about 1 cup per meal. Use measuring cups to be accurate.  Continue to use saucer/salad plates, infant/toddler silverware to keep portion sizes small and take small bites.  Eat S-L-O-W-L-Y to make each meal last 20-30 minutes. Always stop eating when satisfied.  Continue to use caution with foods containing skins, peels or membranes. Chew well!  Aim for 64 oz. of calorie-free fluids daily.  Continue to avoid caffeine and carbonation. If you choose to drink alcohol, do so in moderation.   Remember to avoid drinking during meals, 15-30 minutes before and 30 minutes after.  Exercise is benavidez for continued weight loss and weight maintenance. 150 minutes weekly of  "moderate aerobic activity or 75 minutes of vigorous with 2 days or more a week of strength training. Try to get 20% or more of your steps each day at a brisk pace, as though hurrying to a bus stop. Look to get stronger this year.  If having trouble tolerating meat, try using a crock-pot, tinfoil tent, steamer or other moist cooking method to create tender meats. Add broth or low-fat gravy to help meat stay moist.   Avoid high sugar and high fat foods to prevent dumping syndrome.  Check nutrition labels for less than 10 grams of sugar and less than 10 grams of fat per serving.  Continue Taking Vitamins/Minerals:  1000 mcg of Sublingual B-12 at least 3 days weekly to average 350-500mcg/day. If using 2500mcg lozenges, 2 weekly.  If 5000 mcg, once weekly dosing works.  1 Complete Multivitamin with 18mg Iron twice daily (chewable or swallow tabs). Often sold as \"women's one a day\" if tablet but take twice daily.  500-600 mg Calcium Citrate twice daily (chewable or swallow tabs).  5000 IU Vitamin D3 daily.  If menstruating, you may need closer to 60mg of iron daily to prevent iron deficiency. An occasional \"boost\" of extra iron supplement during/after is reasonable if heavy flow.    Sample Grocery List    Protein:  Fat free Greek or light yogurt (less than 10 grams sugar)  Fat free or low-fat cottage cheese  String cheese or reduced fat cheese slices  Tuna, salmon, crab, egg, or chicken salad made with light or fat free mayonnaise  Egg or Egg Substitute  Lean/extra lean turkey, beef, bison, venison (ground, sirloin, round, flank)  Pork loin or tenderloin (grilled, baked, broiled)  Fish such as salmon, tuna, trout, tilapia, etc. (grilled, baked, broiled)  Tender cuts of lean (skinless) turkey or chicken  Lean deli meats: turkey, lean ham, chicken, lean roast beef  Beans such as kidney, garbanzo, black, mendez, or low-fat/fat free refried beans  Peanut butter (natural preferred). Limit to 1 Tbsp. per day.  Low-fat meatloaf " (made with lean ground beef or turkey)  Sloppy Joes made with low-sugar ketchup and lean ground beef or turkey  Soy or vegetable protein (i.e. vegan crumbles, soy/veggie burger, tofu)  Hummus    Vegetables:  Fresh: cooked or raw (as tolerated)  Frozen vegetables  Canned vegetables (low sodium or no salt added, rinse before cooking/eating)  (Ok to have skins/peels/membranes/seeds - just chew well)    Fruits:  Fresh fruit  Frozen fruit (no sugar added)  Canned fruit (packed in its own juice, NOT syrup)  (Ok to have skins/peels/membranes/seeds - just chew well)    Starch:  Unsweetened whole-grain hot cereal (or high fiber cold cereal, dry)  Toasted whole wheat bread or [place] Thins  Whole grain crackers  Baked /boiled/mashed potato/sweet potato  Cooked whole grain pasta, brown rice, or other cooked whole grains  Starchy vegetables: corn, peas, winter squash    Protein Supplement:   Ready to drink protein shake with:  15-30 grams protein per serving  Less than 10 grams total carbohydrate per serving   Protein powder mixed with:   Skim or 1% milk  Low fat or fat free Lactaid milk, plain or no sugar added soymilk  Water     Fats: (use in moderation)  1 teaspoon of soft tub margarine  1 teaspoon olive oil, canola oil, or peanut oil  1 tablespoon of low-fat mccormick or salad dressing     Sample Menu for 18+ months after Gastric Bypass    You do NOT need to eat/drink the full portion sizes listed below  Always stop when you are satisfied    Breakfast   cup 1% cottage cheese     cup mixed berries   Lunch 2 oz lean roast beef on   [place] Thin with 1 tsp. light mccormick    small tomato, chopped, mixed with 1 tsp. light vinaigrette dressing   Supplement Approved protein supplement (if needed between meals)   Dinner 2 oz grilled salmon    cup salad greens with 1 tsp. light salad dressing and 1 tsp. ground flax seed    cup quinoa or brown rice     Breakfast   cup egg substitute with   cup sautéed chopped vegetables  2 light [place] Krisp  crackers   Lunch Tuna Melt:   cup tuna mixed with 1 tsp. light mccormick over   [place] Thin. Top with 2-3 slices cucumber and 1 oz slice of low fat cheese   Supplement 1 cup skim milk (if needed between meals)   Dinner 3 oz  grilled, broiled, or baked seasoned skinless chicken breast    cup asparagus     Breakfast   cup plain oatmeal made with skim or 1% milk with 1 Tbsp. flavored/unflavored protein powder added  1 mozzarella string cheese   Lunch 2 oz deli turkey breast  1/3 cup salad with 1 tsp. light salad dressing, 1/8 of a whole avocado and 1 Tbsp. sunflower seeds   Dinner 3 oz. pork loin made in a crock pot, seasoned with a spice rub    cup cooked carrots   Supplement Approved protein supplement (if needed between meals)     Breakfast 1 cup breakfast casserole made with egg substitute, turkey sausage,  and steamed, chopped bell peppers   Supplement  1 cup light Greek yogurt (if needed between meals)   Lunch 2 oz. teriyaki turkey    cup mashed sweet potato with 1-2 spritzes of spray butter    cup fresh pineapple   Dinner 3 oz low fat meatloaf    cup roasted garlic zucchini     Breakfast   cup leftover breakfast casserole    cup no sugar added applesauce with 1 Tbsp. unflavored protein powder and a sprinkle of cinnamon    Lunch 3 oz shrimp with 1-2 Tbsp. low-sugar cocktail sauce for dipping    c. whole wheat pasta drizzled with   tsp. olive oil   Supplement 1 cup skim/1% milk with scoop of protein powder (if needed between meals)   Dinner Grilled, seasoned kebob with 2 oz lean beef and   cup vegetables     Breakfast Breakfast pizza:   [place] Thin spread with 1 Tbsp. low sugar spaghetti sauce,   cup shredded low fat cheese, melted and 1 slice of [place] varma     cup fresh fruit mixed with chopped almonds   Lunch   cup black bean soup  4-5 whole grain crackers   Dinner 3 oz  tilapia with lemon pepper seasoning    cup stewed tomatoes   Supplement 1 string cheese (if needed between meals)     Breakfast 2 hard  boiled eggs (discard 1 egg yolk)    whole wheat English Muffin with 1 tsp. low sugar jelly   Lunch   cup leftover black bean soup topped with 1-2 Tbsp. low fat cheese  2-3 light [place] Krisp crackers   Supplement Approved protein supplement (if needed between meals)   Dinner 3 oz sirloin steak    cup steamed broccoli

## 2025-06-05 NOTE — LETTER
"6/5/2025      Leah John  [address] [place] [address]      Dear Colleague,    Thank you for referring your patient, Leah John, to the [facility] [place]. Please see a copy of my visit note below.    Bariatric Follow Up Visit with a History of Previous Bariatric Surgery     Date of visit: 6/4/2025  Physician: Manoj Stover MD, MD  Primary Care Provider:  Keiry Barrientos  Leah Duranjane   44 year old  female    Date of Surgery: 7/15/15  Initial Weight: 311 lbs  Initial BMI: 59.7  Today's Weight:   Wt Readings from Last 1 Encounters:   06/05/25 107 kg (236 lb)     Weight history:     Wt Readings from Last 4 Encounters:   06/05/25 107 kg (236 lb)   01/23/25 112 kg (247 lb)   12/12/24 108.9 kg (240 lb)   07/15/24 107.8 kg (237 lb 9.6 oz)      Body mass index is 46.09 kg/m .    Peak weight on graph of 279 lbs in 2021, 43 lbs down since then, a 15.6% total body weight reduction and maintains a 75 lb reduction from her preop weight, a 24% total body weight reduction with 11 lb reduction this year since transitioning to Zepbound.   Assessment and Plan     Assessment: Leah is a 44 year old year old female who is approaching 10 years s/p  Pavithra en Y Gastric Bypass with Dr. Olivier.  She's been treating resistant Class III morbid obesity with a combination of bariatric surgery/dietary routines and has been using Wegovy with some help in stabilizing weight through a very stressful family time in her life (sick child and spouse undergoing alcohol dependency). In the interim since our 7/15/24 visit, her insurance failed to continue covering Wegovy due to failure to achieve a 5% weight reduction and we transitioned to Zepbound 11/11/24 with goal to ramp to 10mg/week. In the interim she's only now starting to feel \"a little effect\" on satiety/brain hunger and we'll increase to 12.5mg/week for 2 months and top out at the 15mg/week in 8-9 weeks if " tolerated.    Bariatric labs were adequate 10/14/24 but with mild microcytic anemia and low ferritin.  Without excessive losses, most bariatric surgery patient's will require 36-60mg/day of iron support to prevent deficiency.  She's started a One a Day Bariatric vitamin this Spring with 45mg/serving of iron. No longer menstruates.    Stress levels are high with both her 8 yo daughter and a Dad with Dementia and Mom with frequent falls such that she's her main caretaker and had to higher PCA for her Dad.         Leah John feels as if she is on track now to achieve the goals she hoped to accomplish through bariatric surgery and weight loss.    Encounter Diagnoses   Name Primary?     Hx of gastric bypass      Class 3 severe obesity due to excess calories with serious comorbidity and body mass index (BMI) of 45.0 to 49.9 in adult (H)          Current Outpatient Medications:      FLUoxetine (PROZAC) 20 MG capsule, Take 1 capsule (20 mg) by mouth daily., Disp: 90 capsule, Rfl: 4     gabapentin (NEURONTIN) 100 MG capsule, Take 1 capsule (100 mg) by mouth at bedtime., Disp: 60 capsule, Rfl: 4     levonorgestrel (MIRENA) 52 MG (20 mcg/day) IUD, 1 each by Intrauterine route once., Disp: , Rfl:      Multiple Vitamin (MULTIVITAMIN ADULT PO), , Disp: , Rfl:      tirzepatide-Weight Management (ZEPBOUND) 10 MG/0.5ML prefilled pen, Inject 0.5 mLs (10 mg) subcutaneously every 7 days., Disp: 6 mL, Rfl: 1    Current Facility-Administered Medications:      levonorgestrel (MIRENA) 52 MG (20 mcg/day) IUD 1 each, 1 each, Intrauterine, See Admin Instructions, , 1 each at 01/23/25 9270    Plan:  Increase Zepbound to 12.5mg/week for 2 months then increase to top dose of 15mg/week if tolerated. If too strong, we'll drop back and hold at this 10mg/week dose that you tolerate well but has lower than average efficacy for you. Stop Zepbound if severe abdominal pain/vomiting/rash/throat swelling or other severe intolerance.    2. Check labs  "at our next visit. Continue Adarsh Life Just One.     3. Continue to de-stress with some recreation/walking/roller blading/swimming/yard care. 20 minutes most days of the week would be ideal.  Longer is better/fine but even 10 minutes/day makes a difference in stress hormones/sleep.     4. Follow up in 4-5 months for annual review/weight check visit. Hit your 70-80 grams of lean protein daily to keep muscle mass intact. Supplement as needed with your Fairlife shake.   No follow-ups on file.    Bariatric Surgery Review     Interim History/LifeChanges: Dad has alzhemier's, Mom is injured/falling. Daughter had anal tear.     Patient Concerns: follow up on meds  Appetite (1-10): improving but still lower satiety signaling than average and we'll increase Zepbound to top doses accordingly.  GERD: no.    Reviewed whether any need/indication for screening EGD today and we will deferred.  Typically, a screening EGD is recommend post op year 2-3 if no symptoms to assess health of esophagus/bariatric surgery and sooner if difficult to control GERD or persistent pain/dysphagia sx despite behavior modification.    Medication changes:     Vitamin Intake: TraxerLife One  B-12   yes   MVI  yes   Vitamin D  yes   Calcium   High dairy use     Other                LABS: \"Reviewed     Rbs/Bac-Ump Bariatric    Question 6/4/2025  3:19 PM CDT - Filed by Patient   THESE QUESTIONS ARE ABOUT YOUR WEIGHT    How has your weight changed since your last visit? I have stayed about the same   What is your lowest weight since surgery? (In pounds) 225   Are you currently taking any weight loss medications? Yes   What is your highest lifetime weight? 310   I had the following weight loss procedure: Pavithra-en-y Gastric Bypass   What year was your surgery? 2015   THESE QUESTIONS ARE ABOUT YOUR DIET AND PHYSICAL ACTIVITY    How many meals do you eat per day? 2   Do you snack between meals? Sometimes   How much food are you eating at each meal? 1/2 cup to 1 " cup   Are you able to separate your meals and liquids by at least 30 minutes? Sometimes   Are you able to avoid liquid calories? Sometimes   Do you avoid NSAIDs such as (Ibuprofen, Aleve, Naproxen, Advil)? Yes   How often do you exercise? 3 to 4 times per week   What is the duration of your exercise (in minutes)? 20 Minutes   What types of exercise do you do? walking    swimming    other   What keeps you from being more active? Lack of Time   Are you smoking? No   Are you drinking alcohol? Yes   How much alcohol? 3 glasses wine per week   Review of symptoms: Please check the following symptoms you have experienced within the last 30 days.    Vomiting: No   Diarrhea: No   Constipation: No   Swallowing trouble: No   Heartburn: No   Abdominal pain: No   Rash in skin folds: No   Depression: Yes   Anxiety: Yes   Stress urinary incontinence Yes   Female only: Birth control   Please faith all conditions you had prior to having weight loss surgery:    Diabetes II: Never   High Blood Pressure: Never   High cholesterol: Improved   Heartburn/Reflux: Improved   Sleep apnea: Never   Pre-diabetes: Gone away   PCOS: Improved   Back pain: Improved   Joint pain: Improved   Lower leg swelling: Never   THESE QUESTIONS ASK ABOUT CURRENT HEALTH CONCERNS    Have you been to the Emergency room since your last visit with us? No   Were you in the hospital since your last visit with us? No   Do you have any concerns today? No   Do you currently have any of the following: None of the above   Do you have a band? No     Promis-10   [id number] [employer] Version 1.1    Question 6/4/2025  3:20 PM CDT - Filed by Patient   In general, would you say your health is: Good   In general, would you say your quality of life is: Good   In general, how would you rate your physical health? Good   In general, how would you rate your mental health, including your mood and your ability to think? Good   In general, how  "would you rate your satisfaction with your social activities and relationships? Good   In general, please rate how well you carry out your usual social activities and roles. (This includes activities at home, at work and in your community, and responsibilities as a parent, child, spouse, employee, friend, etc.) Good   To what extent are you able to carry out your everyday physical activities such as walking, climbing stairs, carrying groceries, or moving a chair? Completely   In the past 7 days    How often have you been bothered by emotional problems such as feeling anxious, depressed or irritable? Often   How would you rate your fatigue on average? Moderate   How would you rate your pain on average?   0 = No Pain  to  10 = Worst Imaginable Pain 3       Most recent labs:  Lab Results   Component Value Date    WBC 6.3 02/03/2025    HGB 10.3 (L) 02/03/2025    HCT 33.1 (L) 02/03/2025    MCV 76 (L) 02/03/2025     02/03/2025     Lab Results   Component Value Date    CHOL 185 02/03/2025     Lab Results   Component Value Date    HDL 59 02/03/2025     No components found for: \"LDLCALC\"  Lab Results   Component Value Date    TRIG 68 02/03/2025     No results found for: \"CHOLHDL\"  Lab Results   Component Value Date    ALT 20 10/14/2024    AST 27 10/14/2024    ALKPHOS 96 10/14/2024     No results found for: \"HGBA1C\"  Lab Results   Component Value Date    B12 368 10/14/2024     No components found for: \"VITDT1\"  Lab Results   Component Value Date    JAMAR 8 02/03/2025     Lab Results   Component Value Date    PTHI 47 08/10/2023     Lab Results   Component Value Date    ZN 82.7 10/14/2024     Lab Results   Component Value Date    VIB1WB 111 10/14/2024     Lab Results   Component Value Date    TSH 3.22 02/03/2025     No results found for: \"TEST\"    Habits:  See above.     Social History     Social History     Socioeconomic History     Marital status:      Spouse name: Not on file     Number of children: Not on file "     Years of education: Not on file     Highest education level: Not on file   Occupational History     Not on file   Tobacco Use     Smoking status: Never     Passive exposure: Never     Smokeless tobacco: Never   Vaping Use     Vaping status: Never Used   Substance and Sexual Activity     Alcohol use: Yes     Alcohol/week: 2.5 standard drinks of alcohol     Types: 3 Standard drinks or equivalent per week     Drug use: No     Sexual activity: Yes     Partners: Male     Birth control/protection: I.U.D., Other   Other Topics Concern     Parent/sibling w/ CABG, MI or angioplasty before 65F 55M? No   Social History Narrative     Not on file     Social Drivers of Health     Financial Resource Strain: Low Risk  (1/29/2025)    Financial Resource Strain      Within the past 12 months, have you or your family members you live with been unable to get utilities (heat, electricity) when it was really needed?: No   Food Insecurity: Low Risk  (1/29/2025)    Food Insecurity      Within the past 12 months, did you worry that your food would run out before you got money to buy more?: No      Within the past 12 months, did the food you bought just not last and you didn t have money to get more?: No   Transportation Needs: Low Risk  (1/29/2025)    Transportation Needs      Within the past 12 months, has lack of transportation kept you from medical appointments, getting your medicines, non-medical meetings or appointments, work, or from getting things that you need?: No   Physical Activity: Insufficiently Active (1/29/2025)    Exercise Vital Sign      Days of Exercise per Week: 2 days      Minutes of Exercise per Session: 20 min   Stress: No Stress Concern Present (1/29/2025)    [place] - Occupational Stress Questionnaire      Feeling of Stress : Only a little   Social Connections: Unknown (1/29/2025)    Social Connection and Isolation Panel [NHANES]      Frequency of Communication with Friends and  Family: Not on file      Frequency of Social Gatherings with Friends and Family: Once a week      Attends Temple Services: Not on file      Active Member of Clubs or Organizations: Not on file      Attends Club or Organization Meetings: Not on file      Marital Status: Not on file   Interpersonal Safety: Low Risk  (2/3/2025)    Interpersonal Safety      Do you feel physically and emotionally safe where you currently live?: Yes      Within the past 12 months, have you been hit, slapped, kicked or otherwise physically hurt by someone?: No      Within the past 12 months, have you been humiliated or emotionally abused in other ways by your partner or ex-partner?: No   Housing Stability: Low Risk  (1/29/2025)    Housing Stability      Do you have housing? : Yes      Are you worried about losing your housing?: No       Past Medical History     Past Medical History:   Diagnosis Date     Anisometropia      Anxiety      Cervical intraepithelial neoplasia III 2013     ALFRED III (cervical intraepithelial neoplasia III) 7/20/2012 2005, 2006 NIL paps 10/17/07 ASCUS, neg HPV 2008, 2009, 2011 NIL paps 6/25/12 ASC-H 7/20/12 Colpo bx ALFRED I, II, III, ECC neg 9/13/12 LEEP ALFRED I & III, margins neg, ECC neg 4/8/13 NIL pap, neg HPV 10/3/13 NIL pap, neg HPV 1/15/15 NIL pap, neg HPV Above per Care Everywhere 12/29/16 NIL pap, neg HPV 8/17/21 NIL pap, +HR HPV (not 16/18). Plan: colposcopy due before 11/17/21 9/29/21 Dillard: atypical squa     Depression     20s; on medications zoloft; prozac x 1 year     Depressive disorder      Dyslipidemia 2014     Hyperlipidemia with target LDL less than 160 1/11/2011    Formatting of this note might be different from the original. ICD 10     Hyperparathyroidism 1/24/2019     Hypothyroidism      Iron deficiency anemia 1/20/2018     Knee pain 3/14/2011     Low vitamin B12 level 1/24/2019     Metabolic syndrome      Morbid obesity (H)      Morbid obesity with BMI of 50.0-59.9, adult (H) 3/25/2015      MTHFR mutation     heterozygous     PCOS (polycystic ovarian syndrome)      Plantar fasciitis 6/10/2022     Polycystic ovary syndrome      Pre-diabetes      Prothrombin gene mutation 2017    heterozygous     S/P bariatric surgery 7/15/2015     Strabismus      Stress fracture of right foot, initial encounter 6/10/2022     Vitamin D deficiency 2019     Past Surgical History:   Procedure Laterality Date     ABDOMEN SURGERY  2015    Gastric bypass     BIOPSY CERVICAL, LOCAL EXCISION, SINGLE/MULTIPLE      ALFRED III      SECTION N/A 2018    Procedure: PRIMARY  SECTION;  Surgeon: Becky Rg MD;  Location: [facility] OR;  Service:      COLONOSCOPY       EXCISE GANGLION WRIST Right      EYE SURGERY      x3; as a child     LEEP TX, CERVICAL       CA LAP GASTRIC BYPASS/CRISTIANE-EN-Y N/A 07/15/2015    Mark Olivier MD; [facility] Initial Weight 311 lbs, BMI 59.7     SOFT TISSUE SURGERY      Ganglion cyst removal       Problem List     Patient Active Problem List   Diagnosis     Knee pain     Morbid obesity (H)     Anisometropia     Anxiety     Hyperlipidemia with target LDL less than 160     Hyperparathyroidism     Hypothyroidism     Low vitamin B12 level     Metabolic syndrome     MTHFR mutation     PCOS (polycystic ovarian syndrome)     Pre-diabetes     S/P bariatric surgery     Vitamin D deficiency     ALFRED III (cervical intraepithelial neoplasia III)     Stress fracture of right foot, initial encounter     Plantar fasciitis     IUD (intrauterine device) in place     Medications     [unfilled]  Surgical History     Past Surgical History  She has a past surgical history that includes leep tx, cervical (); Excise ganglion wrist (Right); Eye surgery; Pr Lap Gastric Bypass/Cristiane-En-Y (N/A, 07/15/2015); Biopsy cervical, local excision, single/multiple ();  Section (N/A, 2018); Abdomen surgery (2015); colonoscopy (); and Soft tissue  surgery ().    Objective-Exam     Constitutional:  Ht 1.524 m (5')   Wt 107 kg (236 lb)   BMI 46.09 kg/m    [unfilled]   General:  Pleasant and in no acute distress   Eyes:  EOMI  ENT:  Airway patent, red hair.    Neck:  Respiratory: Normal respiratory effort, no cough, .  CV:    Gastrointestinal:   Musculoskeletal: muscle mass WNL  Skin: color fair, hair thick,   Psychiatric: alert and oriented X3, mood and affect normal    Counseling     We reviewed the important post op bariatric recommendations:  -eating 3 meals daily  -eating protein first, getting >60gm protein daily  -eating slowly, chewing food well  -avoiding/limiting calorie containing beverages  -drinking water 15-30 minutes before or after meals  -limiting restaurant or cafeteria eating to twice a week or less  Reviewed medication support/role for fitness in stress relief.    Manoj Stover MD    [facility].  2025  9:26 PM  [phone] (clinic phone)  [phone] (fax)    No images are attached to the encounter.  Medical Decision Makin minutes spent by me on the date of the encounter doing chart review, history and exam, documentation and further activities per the note    Virtual Visit Details    Type of service:  Video Visit     Originating Location (pt. Location): Other work    Distant Location (provider location):  On-site  Platform used for Video Visit: Mo    Again, thank you for allowing me to participate in the care of your patient.        Sincerely,        Manoj Stover MD    Electronically signed

## 2025-06-05 NOTE — NURSING NOTE
Current patient location: Work     Is the patient currently in the state of [place]? YES    Visit mode: VIDEO    If the visit is dropped, the patient can be reconnected by:VIDEO VISIT: Text to cell phone:   Telephone Information:   Mobile [phone]       Will anyone else be joining the visit? NO  (If patient encounters technical issues they should call [phone])    Are changes needed to the allergy or medication list? Pt stated no changes to allergies and Pt stated no med changes    Are refills needed on medications prescribed by this physician? Discuss with provider    Rooming Documentation:  Questionnaire(s) completed      Reason for visit: RECHECK    Wt other than 24 hrs:    Pain more than one location:    Jackie ISRAELF

## 2025-06-05 NOTE — PROGRESS NOTES
"Bariatric Follow Up Visit with a History of Previous Bariatric Surgery     Date of visit: 6/4/2025  Physician: Manoj Stover MD, MD  Primary Care Provider:  Keiry Barrientos  Leah Duranjane   44 year old  female    Date of Surgery: 7/15/15  Initial Weight: 311 lbs  Initial BMI: 59.7  Today's Weight:   Wt Readings from Last 1 Encounters:   06/05/25 107 kg (236 lb)     Weight history:     Wt Readings from Last 4 Encounters:   06/05/25 107 kg (236 lb)   01/23/25 112 kg (247 lb)   12/12/24 108.9 kg (240 lb)   07/15/24 107.8 kg (237 lb 9.6 oz)      Body mass index is 46.09 kg/m .    Peak weight on graph of 279 lbs in 2021, 43 lbs down since then, a 15.6% total body weight reduction and maintains a 75 lb reduction from her preop weight, a 24% total body weight reduction with 11 lb reduction this year since transitioning to Zepbound.   Assessment and Plan     Assessment: Leah is a 44 year old year old female who is approaching 10 years s/p  Pavithra en Y Gastric Bypass with Dr. Olivier.  She's been treating resistant Class III morbid obesity with a combination of bariatric surgery/dietary routines and has been using Wegovy with some help in stabilizing weight through a very stressful family time in her life (sick child and spouse undergoing alcohol dependency). In the interim since our 7/15/24 visit, her insurance failed to continue covering Wegovy due to failure to achieve a 5% weight reduction and we transitioned to Zepbound 11/11/24 with goal to ramp to 10mg/week. In the interim she's only now starting to feel \"a little effect\" on satiety/brain hunger and we'll increase to 12.5mg/week for 2 months and top out at the 15mg/week in 8-9 weeks if tolerated.    Bariatric labs were adequate 10/14/24 but with mild microcytic anemia and low ferritin.  Without excessive losses, most bariatric surgery patient's will require 36-60mg/day of iron support to prevent deficiency.  She's started a One a Day Bariatric vitamin this Spring " with 45mg/serving of iron. No longer menstruates.    Stress levels are high with both her 8 yo daughter and a Dad with Dementia and Mom with frequent falls such that she's her main caretaker and had to higher PCA for her Dad.         Leah John feels as if she is on track now to achieve the goals she hoped to accomplish through bariatric surgery and weight loss.    Encounter Diagnoses   Name Primary?    Hx of gastric bypass     Class 3 severe obesity due to excess calories with serious comorbidity and body mass index (BMI) of 45.0 to 49.9 in adult (H)          Current Outpatient Medications:     FLUoxetine (PROZAC) 20 MG capsule, Take 1 capsule (20 mg) by mouth daily., Disp: 90 capsule, Rfl: 4    gabapentin (NEURONTIN) 100 MG capsule, Take 1 capsule (100 mg) by mouth at bedtime., Disp: 60 capsule, Rfl: 4    levonorgestrel (MIRENA) 52 MG (20 mcg/day) IUD, 1 each by Intrauterine route once., Disp: , Rfl:     Multiple Vitamin (MULTIVITAMIN ADULT PO), , Disp: , Rfl:     tirzepatide-Weight Management (ZEPBOUND) 10 MG/0.5ML prefilled pen, Inject 0.5 mLs (10 mg) subcutaneously every 7 days., Disp: 6 mL, Rfl: 1    Current Facility-Administered Medications:     levonorgestrel (MIRENA) 52 MG (20 mcg/day) IUD 1 each, 1 each, Intrauterine, See Admin Instructions, , 1 each at 01/23/25 4943    Plan:  Increase Zepbound to 12.5mg/week for 2 months then increase to top dose of 15mg/week if tolerated. If too strong, we'll drop back and hold at this 10mg/week dose that you tolerate well but has lower than average efficacy for you. Stop Zepbound if severe abdominal pain/vomiting/rash/throat swelling or other severe intolerance.    2. Check labs at our next visit. Continue Adarsh Life Just One.     3. Continue to de-stress with some recreation/walking/roller blading/swimming/yard care. 20 minutes most days of the week would be ideal.  Longer is better/fine but even 10 minutes/day makes a difference in stress hormones/sleep.     4.  "Follow up in 4-5 months for annual review/weight check visit. Hit your 70-80 grams of lean protein daily to keep muscle mass intact. Supplement as needed with your Fairlife shake.   No follow-ups on file.    Bariatric Surgery Review     Interim History/LifeChanges: Dad has alzhemier's, Mom is injured/falling. Daughter had anal tear.     Patient Concerns: follow up on meds  Appetite (1-10): improving but still lower satiety signaling than average and we'll increase Zepbound to top doses accordingly.  GERD: no.    Reviewed whether any need/indication for screening EGD today and we will deferred.  Typically, a screening EGD is recommend post op year 2-3 if no symptoms to assess health of esophagus/bariatric surgery and sooner if difficult to control GERD or persistent pain/dysphagia sx despite behavior modification.    Medication changes:     Vitamin Intake: BariLife One  B-12   yes   MVI  yes   Vitamin D  yes   Calcium   High dairy use     Other                LABS: \"Reviewed     Rbs/Bac-Ump Bariatric    Question 6/4/2025  3:19 PM CDT - Filed by Patient   THESE QUESTIONS ARE ABOUT YOUR WEIGHT    How has your weight changed since your last visit? I have stayed about the same   What is your lowest weight since surgery? (In pounds) 225   Are you currently taking any weight loss medications? Yes   What is your highest lifetime weight? 310   I had the following weight loss procedure: Pavithra-en-y Gastric Bypass   What year was your surgery? 2015   THESE QUESTIONS ARE ABOUT YOUR DIET AND PHYSICAL ACTIVITY    How many meals do you eat per day? 2   Do you snack between meals? Sometimes   How much food are you eating at each meal? 1/2 cup to 1 cup   Are you able to separate your meals and liquids by at least 30 minutes? Sometimes   Are you able to avoid liquid calories? Sometimes   Do you avoid NSAIDs such as (Ibuprofen, Aleve, Naproxen, Advil)? Yes   How often do you exercise? 3 to 4 times per week   What is the duration of your " exercise (in minutes)? 20 Minutes   What types of exercise do you do? walking    swimming    other   What keeps you from being more active? Lack of Time   Are you smoking? No   Are you drinking alcohol? Yes   How much alcohol? 3 glasses wine per week   Review of symptoms: Please check the following symptoms you have experienced within the last 30 days.    Vomiting: No   Diarrhea: No   Constipation: No   Swallowing trouble: No   Heartburn: No   Abdominal pain: No   Rash in skin folds: No   Depression: Yes   Anxiety: Yes   Stress urinary incontinence Yes   Female only: Birth control   Please faith all conditions you had prior to having weight loss surgery:    Diabetes II: Never   High Blood Pressure: Never   High cholesterol: Improved   Heartburn/Reflux: Improved   Sleep apnea: Never   Pre-diabetes: Gone away   PCOS: Improved   Back pain: Improved   Joint pain: Improved   Lower leg swelling: Never   THESE QUESTIONS ASK ABOUT CURRENT HEALTH CONCERNS    Have you been to the Emergency room since your last visit with us? No   Were you in the hospital since your last visit with us? No   Do you have any concerns today? No   Do you currently have any of the following: None of the above   Do you have a band? No     Prom[employer]   [id number] [employer] Version 1.1    Question 6/4/2025  3:20 PM CDT - Filed by Patient   In general, would you say your health is: Good   In general, would you say your quality of life is: Good   In general, how would you rate your physical health? Good   In general, how would you rate your mental health, including your mood and your ability to think? Good   In general, how would you rate your satisfaction with your social activities and relationships? Good   In general, please rate how well you carry out your usual social activities and roles. (This includes activities at home, at work and in your community, and responsibilities as a parent, child, spouse,  "employee, friend, etc.) Good   To what extent are you able to carry out your everyday physical activities such as walking, climbing stairs, carrying groceries, or moving a chair? Completely   In the past 7 days    How often have you been bothered by emotional problems such as feeling anxious, depressed or irritable? Often   How would you rate your fatigue on average? Moderate   How would you rate your pain on average?   0 = No Pain  to  10 = Worst Imaginable Pain 3       Most recent labs:  Lab Results   Component Value Date    WBC 6.3 02/03/2025    HGB 10.3 (L) 02/03/2025    HCT 33.1 (L) 02/03/2025    MCV 76 (L) 02/03/2025     02/03/2025     Lab Results   Component Value Date    CHOL 185 02/03/2025     Lab Results   Component Value Date    HDL 59 02/03/2025     No components found for: \"LDLCALC\"  Lab Results   Component Value Date    TRIG 68 02/03/2025     No results found for: \"CHOLHDL\"  Lab Results   Component Value Date    ALT 20 10/14/2024    AST 27 10/14/2024    ALKPHOS 96 10/14/2024     No results found for: \"HGBA1C\"  Lab Results   Component Value Date    B12 368 10/14/2024     No components found for: \"VITDT1\"  Lab Results   Component Value Date    JAMAR 8 02/03/2025     Lab Results   Component Value Date    PTHI 47 08/10/2023     Lab Results   Component Value Date    ZN 82.7 10/14/2024     Lab Results   Component Value Date    VIB1WB 111 10/14/2024     Lab Results   Component Value Date    TSH 3.22 02/03/2025     No results found for: \"TEST\"    Habits:  See above.     Social History     Social History     Socioeconomic History    Marital status:      Spouse name: Not on file    Number of children: Not on file    Years of education: Not on file    Highest education level: Not on file   Occupational History    Not on file   Tobacco Use    Smoking status: Never     Passive exposure: Never    Smokeless tobacco: Never   Vaping Use    Vaping status: Never Used   Substance and Sexual Activity    Alcohol " use: Yes     Alcohol/week: 2.5 standard drinks of alcohol     Types: 3 Standard drinks or equivalent per week    Drug use: No    Sexual activity: Yes     Partners: Male     Birth control/protection: I.U.D., Other   Other Topics Concern    Parent/sibling w/ CABG, MI or angioplasty before 65F 55M? No   Social History Narrative    Not on file     Social Drivers of Health     Financial Resource Strain: Low Risk  (1/29/2025)    Financial Resource Strain     Within the past 12 months, have you or your family members you live with been unable to get utilities (heat, electricity) when it was really needed?: No   Food Insecurity: Low Risk  (1/29/2025)    Food Insecurity     Within the past 12 months, did you worry that your food would run out before you got money to buy more?: No     Within the past 12 months, did the food you bought just not last and you didn t have money to get more?: No   Transportation Needs: Low Risk  (1/29/2025)    Transportation Needs     Within the past 12 months, has lack of transportation kept you from medical appointments, getting your medicines, non-medical meetings or appointments, work, or from getting things that you need?: No   Physical Activity: Insufficiently Active (1/29/2025)    Exercise Vital Sign     Days of Exercise per Week: 2 days     Minutes of Exercise per Session: 20 min   Stress: No Stress Concern Present (1/29/2025)    [employer] [place] [employer] - Occupational Stress Questionnaire     Feeling of Stress : Only a little   Social Connections: Unknown (1/29/2025)    Social Connection and Isolation Panel [NHANES]     Frequency of Communication with Friends and Family: Not on file     Frequency of Social Gatherings with Friends and Family: Once a week     Attends Islam Services: Not on file     Active Member of Clubs or Organizations: Not on file     Attends Club or Organization Meetings: Not on file     Marital Status: Not on file   Interpersonal Safety: Low Risk   (2/3/2025)    Interpersonal Safety     Do you feel physically and emotionally safe where you currently live?: Yes     Within the past 12 months, have you been hit, slapped, kicked or otherwise physically hurt by someone?: No     Within the past 12 months, have you been humiliated or emotionally abused in other ways by your partner or ex-partner?: No   Housing Stability: Low Risk  (1/29/2025)    Housing Stability     Do you have housing? : Yes     Are you worried about losing your housing?: No       Past Medical History     Past Medical History:   Diagnosis Date    Anisometropia     Anxiety     Cervical intraepithelial neoplasia III 2013    ALFRED III (cervical intraepithelial neoplasia III) 7/20/2012    2005, 2006 NIL paps 10/17/07 ASCUS, neg HPV 2008, 2009, 2011 NIL paps 6/25/12 ASC-H 7/20/12 Colpo bx ALFRED I, II, III, ECC neg 9/13/12 LEEP ALFRED I & III, margins neg, ECC neg 4/8/13 NIL pap, neg HPV 10/3/13 NIL pap, neg HPV 1/15/15 NIL pap, neg HPV Above per Care Everywhere 12/29/16 NIL pap, neg HPV 8/17/21 NIL pap, +HR HPV (not 16/18). Plan: colposcopy due before 11/17/21 9/29/21 [place]: atypical squa    Depression     20s; on medications zoloft; prozac x 1 year    Depressive disorder     Dyslipidemia 2014    Hyperlipidemia with target LDL less than 160 1/11/2011    Formatting of this note might be different from the original. ICD 10    Hyperparathyroidism 1/24/2019    Hypothyroidism     Iron deficiency anemia 1/20/2018    Knee pain 3/14/2011    Low vitamin B12 level 1/24/2019    Metabolic syndrome     Morbid obesity (H)     Morbid obesity with BMI of 50.0-59.9, adult (H) 3/25/2015    MTHFR mutation     heterozygous    PCOS (polycystic ovarian syndrome)     Plantar fasciitis 6/10/2022    Polycystic ovary syndrome     Pre-diabetes     Prothrombin gene mutation 01/17/2017    heterozygous    S/P bariatric surgery 7/15/2015    Strabismus     Stress fracture of right foot, initial encounter 6/10/2022    Vitamin D deficiency  2019     Past Surgical History:   Procedure Laterality Date    ABDOMEN SURGERY  2015    Gastric bypass    BIOPSY CERVICAL, LOCAL EXCISION, SINGLE/MULTIPLE      ALFRED III     SECTION N/A 2018    Procedure: PRIMARY  SECTION;  Surgeon: Becky Rg MD;  Location: [facility];  Service:     COLONOSCOPY      EXCISE GANGLION WRIST Right     EYE SURGERY      x3; as a child    LEEP TX, CERVICAL      SD LAP GASTRIC BYPASS/CRISTIANE-EN-Y N/A 07/15/2015    Mark Olivier MD; [facility] Initial Weight 311 lbs, BMI 59.7    SOFT TISSUE SURGERY      Ganglion cyst removal       Problem List     Patient Active Problem List   Diagnosis    Knee pain    Morbid obesity (H)    Anisometropia    Anxiety    Hyperlipidemia with target LDL less than 160    Hyperparathyroidism    Hypothyroidism    Low vitamin B12 level    Metabolic syndrome    MTHFR mutation    PCOS (polycystic ovarian syndrome)    Pre-diabetes    S/P bariatric surgery    Vitamin D deficiency    ALFRED III (cervical intraepithelial neoplasia III)    Stress fracture of right foot, initial encounter    Plantar fasciitis    IUD (intrauterine device) in place     Medications     [unfilled]  Surgical History     Past Surgical History  She has a past surgical history that includes leep tx, cervical (); Excise ganglion wrist (Right); Eye surgery; Pr Lap Gastric Bypass/Cristiane-En-Y (N/A, 07/15/2015); Biopsy cervical, local excision, single/multiple ();  Section (N/A, 2018); Abdomen surgery (2015); colonoscopy (); and Soft tissue surgery ().    Objective-Exam     Constitutional:  Ht 1.524 m (5')   Wt 107 kg (236 lb)   BMI 46.09 kg/m    [unfilled]   General:  Pleasant and in no acute distress   Eyes:  EOMI  ENT:  Airway patent, red hair.    Neck:  Respiratory: Normal respiratory effort, no cough, .  CV:    Gastrointestinal:   Musculoskeletal: muscle mass WNL  Skin: color fair, hair thick,    Psychiatric: alert and oriented X3, mood and affect normal    Counseling     We reviewed the important post op bariatric recommendations:  -eating 3 meals daily  -eating protein first, getting >60gm protein daily  -eating slowly, chewing food well  -avoiding/limiting calorie containing beverages  -drinking water 15-30 minutes before or after meals  -limiting restaurant or cafeteria eating to twice a week or less  Reviewed medication support/role for fitness in stress relief.    Manoj Stover MD    [facility].  2025  9:26 PM  [phone] (clinic phone)  [phone] (fax)    No images are attached to the encounter.  Medical Decision Makin minutes spent by me on the date of the encounter doing chart review, history and exam, documentation and further activities per the note

## 2025-06-05 NOTE — PROGRESS NOTES
Virtual Visit Details    Type of service:  Video Visit     Originating Location (pt. Location): Other work    Distant Location (provider location):  On-site  Platform used for Video Visit: Mo

## 2025-06-10 ENCOUNTER — OFFICE VISIT (OUTPATIENT)
Dept: PODIATRY | Facility: CLINIC | Age: 45
End: 2025-06-10
Payer: COMMERCIAL

## 2025-06-10 VITALS — WEIGHT: 240 LBS | BODY MASS INDEX: 46.87 KG/M2

## 2025-06-10 DIAGNOSIS — M21.621 TAILOR'S BUNION OF RIGHT FOOT: ICD-10-CM

## 2025-06-10 DIAGNOSIS — L57.0 KERATOMA: ICD-10-CM

## 2025-06-10 DIAGNOSIS — M77.51 CAPSULITIS OF METATARSOPHALANGEAL (MTP) JOINT OF RIGHT FOOT: Primary | ICD-10-CM

## 2025-06-10 RX ORDER — LIDOCAINE HYDROCHLORIDE 20 MG/ML
0.5 INJECTION, SOLUTION INFILTRATION; PERINEURAL ONCE
Status: COMPLETED | OUTPATIENT
Start: 2025-06-10 | End: 2025-06-10

## 2025-06-10 RX ORDER — TRIAMCINOLONE ACETONIDE 40 MG/ML
40 INJECTION, SUSPENSION INTRA-ARTICULAR; INTRAMUSCULAR ONCE
Status: COMPLETED | OUTPATIENT
Start: 2025-06-10 | End: 2025-06-10

## 2025-06-10 RX ADMIN — LIDOCAINE HYDROCHLORIDE 0.5 ML: 20 INJECTION, SOLUTION INFILTRATION; PERINEURAL at 11:05

## 2025-06-10 RX ADMIN — TRIAMCINOLONE ACETONIDE 40 MG: 40 INJECTION, SUSPENSION INTRA-ARTICULAR; INTRAMUSCULAR at 11:06

## 2025-06-10 NOTE — LETTER
6/10/2025      Leah John  [address] [place] [address]      Dear Colleague,    Thank you for referring your patient, Leah John, to the [facility]. Please see a copy of my visit note below.        FOOT AND ANKLE SURGERY/PODIATRY PROGRESS NOTE        ASSESSMENT:   Capsulitis fifth MPJ right foot  IPK right foot      TREATMENT:  - I discussed with the patient that on exam today she does have pain to palpation along the fifth MPJ on the right foot consistent with capsulitis.  We reviewed treatment options including anti-inflammatory medication, steroid injection.    -Patient reports that she is unable to tolerate NSAIDs.  She would like to proceed with a steroid injection today.    -Injection site cleansed with an alcohol wipe. Injected 1 cc Kenalog 10/0.5 cc 2% lidocaine plain 5th MPJ right foot. Patient tolerated the procedure well. Band aid applied.     -Also discussed that she does have callus tissue buildup along the plantar fifth MPJ consistent with an IPK.    - I was able to remove callus tissue buildup along the plantar fifth MPJ on the right foot x 1 with a #15 blade.  Recommend patient remove callus tissue buildup with a pumice stone as needed.    -Patient's questions invited and answered. She was encouraged to call my office with any further questions or concerns.     Theo Raines DPM  [facility] Podiatry/Foot & Ankle Surgery      HPI: Leah John was seen again today complaining of pain along the fifth MPJ on the right foot.  Patient reports she has had ongoing pain for the past few weeks along the plantar fifth MPJ on the right foot and believes she has callus formation as well as joint pain.  Denies known trauma.    Past Medical History:   Diagnosis Date     Anisometropia      Anxiety      Cervical intraepithelial neoplasia III 2013     ALFRED III (cervical intraepithelial neoplasia III) 7/20/2012    2005, 2006 NIL paps 10/17/07  ASCUS, neg HPV , ,  NIL paps 12 ASC-H 12 Colpo bx ALFRED I, II, III, ECC neg 12 LEEP ALFRED I & III, margins neg, ECC neg 13 NIL pap, neg HPV 10/3/13 NIL pap, neg HPV 1/15/15 NIL pap, neg HPV Above per Care Everywhere 16 NIL pap, neg HPV 21 NIL pap, +HR HPV (not 16/18). Plan: colposcopy due before 21 [place]: atypical squa     Depression     20s; on medications zoloft; prozac x 1 year     Depressive disorder      Dyslipidemia      Hyperlipidemia with target LDL less than 160 2011    Formatting of this note might be different from the original. ICD 10     Hyperparathyroidism 2019     Hypothyroidism      Iron deficiency anemia 2018     Knee pain 3/14/2011     Low vitamin B12 level 2019     Metabolic syndrome      Morbid obesity (H)      Morbid obesity with BMI of 50.0-59.9, adult (H) 3/25/2015     MTHFR mutation     heterozygous     PCOS (polycystic ovarian syndrome)      Plantar fasciitis 6/10/2022     Polycystic ovary syndrome      Pre-diabetes      Prothrombin gene mutation 2017    heterozygous     S/P bariatric surgery 7/15/2015     Strabismus      Stress fracture of right foot, initial encounter 6/10/2022     Vitamin D deficiency 2019       Past Surgical History:   Procedure Laterality Date     ABDOMEN SURGERY  2015    Gastric bypass     BIOPSY CERVICAL, LOCAL EXCISION, SINGLE/MULTIPLE      ALFRED III      SECTION N/A 2018    Procedure: PRIMARY  SECTION;  Surgeon: Becky Rg MD;  Location: [facility] OR;  Service:      COLONOSCOPY       EXCISE GANGLION WRIST Right      EYE SURGERY      x3; as a child     LEEP TX, CERVICAL       [place] GASTRIC BYPASS/CRISTIANE-EN-Y N/A 07/15/2015    Mark Olivier MD; [facility]. Initial Weight 311 lbs, BMI 59.7     SOFT TISSUE SURGERY      Ganglion cyst removal       Allergies   Allergen Reactions     Nsaids      Hx of gastric bypass and should avoid due  to ulcer/gastritis risks.     Sulfa Antibiotics Unknown         Current Outpatient Medications:      FLUoxetine (PROZAC) 20 MG capsule, Take 1 capsule (20 mg) by mouth daily., Disp: 90 capsule, Rfl: 4     gabapentin (NEURONTIN) 100 MG capsule, Take 1 capsule (100 mg) by mouth at bedtime., Disp: 60 capsule, Rfl: 4     levonorgestrel (MIRENA) 52 MG (20 mcg/day) IUD, 1 each by Intrauterine route once., Disp: , Rfl:      Multiple Vitamin (MULTIVITAMIN ADULT PO), , Disp: , Rfl:      tirzepatide-Weight Management (ZEPBOUND) 12.5 MG/0.5ML prefilled pen, Inject 0.5 mLs (12.5 mg) subcutaneously every 7 days. (Patient not taking: Reported on 6/10/2025), Disp: 2 mL, Rfl: 1     [START ON 7/31/2025] tirzepatide-Weight Management (ZEPBOUND) 15 MG/0.5ML prefilled pen, Inject 0.5 mLs (15 mg) subcutaneously every 7 days. (Patient not taking: Reported on 6/10/2025), Disp: 6 mL, Rfl: 3    Current Facility-Administered Medications:      levonorgestrel (MIRENA) 52 MG (20 mcg/day) IUD 1 each, 1 each, Intrauterine, See Admin Instructions, , 1 each at 01/23/25 1338    Family History   Problem Relation Age of Onset     Obesity Mother      Hypertension Mother      Substance Abuse Mother      Osteoporosis Mother      Depression Father      Hypertension Father      Substance Abuse Father      Depression Sister      Anxiety Disorder Sister      Depression Brother      Diabetes Brother         insulin     Depression Maternal Grandmother      Substance Abuse Maternal Grandmother      Depression Maternal Grandfather      Depression Paternal Grandmother      Osteoporosis Paternal Grandmother      Depression Paternal Grandfather      Cancer Paternal Grandfather         found late, mets, possible lung     Substance Abuse Paternal Grandfather      Depression Sister      Anesthesia Reaction No family hx of        Social History     Socioeconomic History     Marital status:      Spouse name: Not on file     Number of children: Not on file      Years of education: Not on file     Highest education level: Not on file   Occupational History     Not on file   Tobacco Use     Smoking status: Never     Passive exposure: Never     Smokeless tobacco: Never   Vaping Use     Vaping status: Never Used   Substance and Sexual Activity     Alcohol use: Yes     Alcohol/week: 2.5 standard drinks of alcohol     Types: 3 Standard drinks or equivalent per week     Drug use: No     Sexual activity: Yes     Partners: Male     Birth control/protection: I.U.D., Other   Other Topics Concern     Parent/sibling w/ CABG, MI or angioplasty before 65F 55M? No   Social History Narrative     Not on file     Social Drivers of Health     Financial Resource Strain: Low Risk  (1/29/2025)    Financial Resource Strain      Within the past 12 months, have you or your family members you live with been unable to get utilities (heat, electricity) when it was really needed?: No   Food Insecurity: Low Risk  (1/29/2025)    Food Insecurity      Within the past 12 months, did you worry that your food would run out before you got money to buy more?: No      Within the past 12 months, did the food you bought just not last and you didn t have money to get more?: No   Transportation Needs: Low Risk  (1/29/2025)    Transportation Needs      Within the past 12 months, has lack of transportation kept you from medical appointments, getting your medicines, non-medical meetings or appointments, work, or from getting things that you need?: No   Physical Activity: Insufficiently Active (1/29/2025)    Exercise Vital Sign      Days of Exercise per Week: 2 days      Minutes of Exercise per Session: 20 min   Stress: No Stress Concern Present (1/29/2025)    [employer] [place] [employer] - Occupational Stress Questionnaire      Feeling of Stress : Only a little   Social Connections: Unknown (1/29/2025)    Social Connection and Isolation Panel [NHANES]      Frequency of Communication with Friends and Family:  Not on file      Frequency of Social Gatherings with Friends and Family: Once a week      Attends Amish Services: Not on file      Active Member of Clubs or Organizations: Not on file      Attends Club or Organization Meetings: Not on file      Marital Status: Not on file   Interpersonal Safety: Low Risk  (2/3/2025)    Interpersonal Safety      Do you feel physically and emotionally safe where you currently live?: Yes      Within the past 12 months, have you been hit, slapped, kicked or otherwise physically hurt by someone?: No      Within the past 12 months, have you been humiliated or emotionally abused in other ways by your partner or ex-partner?: No   Housing Stability: Low Risk  (1/29/2025)    Housing Stability      Do you have housing? : Yes      Are you worried about losing your housing?: No       10 point Review of Systems is negative except for right foot pain which is noted in HPI.     Wt 108.9 kg (240 lb)   BMI 46.87 kg/m      BMI= Body mass index is 46.87 kg/m .    OBJECTIVE:  General appearance: Patient is alert and fully cooperative with history & exam.  No sign of distress is noted during the visit.    Vascular: Dorsalis pedis palpable right.  Dermatologic: Hyperkeratotic tissue x 1 with nucleus plantar fifth MPJ right foot.  Neurologic: All epicritic and proprioceptive sensations are grossly intact right.  Musculoskeletal: Pain to palpation fifth MPJ right foot.      Again, thank you for allowing me to participate in the care of your patient.        Sincerely,        Theo Raines DPM    Electronically signed

## 2025-06-10 NOTE — PROGRESS NOTES
FOOT AND ANKLE SURGERY/PODIATRY PROGRESS NOTE        ASSESSMENT:   Capsulitis fifth MPJ right foot  IPK right foot      TREATMENT:  - I discussed with the patient that on exam today she does have pain to palpation along the fifth MPJ on the right foot consistent with capsulitis.  We reviewed treatment options including anti-inflammatory medication, steroid injection.    -Patient reports that she is unable to tolerate NSAIDs.  She would like to proceed with a steroid injection today.    -Injection site cleansed with an alcohol wipe. Injected 1 cc Kenalog 10/0.5 cc 2% lidocaine plain 5th MPJ right foot. Patient tolerated the procedure well. Band aid applied.     -Also discussed that she does have callus tissue buildup along the plantar fifth MPJ consistent with an IPK.    - I was able to remove callus tissue buildup along the plantar fifth MPJ on the right foot x 1 with a #15 blade.  Recommend patient remove callus tissue buildup with a pumice stone as needed.    -Patient's questions invited and answered. She was encouraged to call my office with any further questions or concerns.     Theo Raines DPM  [facility] Podiatry/Foot & Ankle Surgery      HPI: Leah John was seen again today complaining of pain along the fifth MPJ on the right foot.  Patient reports she has had ongoing pain for the past few weeks along the plantar fifth MPJ on the right foot and believes she has callus formation as well as joint pain.  Denies known trauma.    Past Medical History:   Diagnosis Date    Anisometropia     Anxiety     Cervical intraepithelial neoplasia III 2013    ALFRED III (cervical intraepithelial neoplasia III) 7/20/2012    2005, 2006 NIL paps 10/17/07 ASCUS, neg HPV 2008, 2009, 2011 NIL paps 6/25/12 ASC-H 7/20/12 Colpo bx ALFRED I, II, III, ECC neg 9/13/12 LEEP ALFRED I & III, margins neg, ECC neg 4/8/13 NIL pap, neg HPV 10/3/13 NIL pap, neg HPV 1/15/15 NIL pap, neg HPV Above per Care Everywhere 12/29/16 NIL pap,  neg HPV 21 NIL pap, +HR HPV (not 16/18). Plan: colposcopy due before 21 [place]: atypical squa    Depression     20s; on medications zoloft; prozac x 1 year    Depressive disorder     Dyslipidemia     Hyperlipidemia with target LDL less than 160 2011    Formatting of this note might be different from the original. ICD 10    Hyperparathyroidism 2019    Hypothyroidism     Iron deficiency anemia 2018    Knee pain 3/14/2011    Low vitamin B12 level 2019    Metabolic syndrome     Morbid obesity (H)     Morbid obesity with BMI of 50.0-59.9, adult (H) 3/25/2015    MTHFR mutation     heterozygous    PCOS (polycystic ovarian syndrome)     Plantar fasciitis 6/10/2022    Polycystic ovary syndrome     Pre-diabetes     Prothrombin gene mutation 2017    heterozygous    S/P bariatric surgery 7/15/2015    Strabismus     Stress fracture of right foot, initial encounter 6/10/2022    Vitamin D deficiency 2019       Past Surgical History:   Procedure Laterality Date    ABDOMEN SURGERY  2015    Gastric bypass    BIOPSY CERVICAL, LOCAL EXCISION, SINGLE/MULTIPLE      ALFRED III     SECTION N/A 2018    Procedure: PRIMARY  SECTION;  Surgeon: Becky Rg MD;  Location: [facility] L[facility]D OR;  Service:     COLONOSCOPY      EXCISE GANGLION WRIST Right     EYE SURGERY      x3; as a child    LEEP TX, CERVICAL      [place] LAP GASTRIC BYPASS/CRISTIANE-EN-Y N/A 07/15/2015    Mark Olivier MD; [facility] Initial Weight 311 lbs, BMI 59.7    SOFT TISSUE SURGERY      Ganglion cyst removal       Allergies   Allergen Reactions    Nsaids      Hx of gastric bypass and should avoid due to ulcer/gastritis risks.    Sulfa Antibiotics Unknown         Current Outpatient Medications:     FLUoxetine (PROZAC) 20 MG capsule, Take 1 capsule (20 mg) by mouth daily., Disp: 90 capsule, Rfl: 4    gabapentin (NEURONTIN) 100 MG capsule, Take 1 capsule (100 mg) by mouth at bedtime., Disp:  60 capsule, Rfl: 4    levonorgestrel (MIRENA) 52 MG (20 mcg/day) IUD, 1 each by Intrauterine route once., Disp: , Rfl:     Multiple Vitamin (MULTIVITAMIN ADULT PO), , Disp: , Rfl:     tirzepatide-Weight Management (ZEPBOUND) 12.5 MG/0.5ML prefilled pen, Inject 0.5 mLs (12.5 mg) subcutaneously every 7 days. (Patient not taking: Reported on 6/10/2025), Disp: 2 mL, Rfl: 1    [START ON 7/31/2025] tirzepatide-Weight Management (ZEPBOUND) 15 MG/0.5ML prefilled pen, Inject 0.5 mLs (15 mg) subcutaneously every 7 days. (Patient not taking: Reported on 6/10/2025), Disp: 6 mL, Rfl: 3    Current Facility-Administered Medications:     levonorgestrel (MIRENA) 52 MG (20 mcg/day) IUD 1 each, 1 each, Intrauterine, See Admin Instructions, , 1 each at 01/23/25 1338    Family History   Problem Relation Age of Onset    Obesity Mother     Hypertension Mother     Substance Abuse Mother     Osteoporosis Mother     Depression Father     Hypertension Father     Substance Abuse Father     Depression Sister     Anxiety Disorder Sister     Depression Brother     Diabetes Brother         insulin    Depression Maternal Grandmother     Substance Abuse Maternal Grandmother     Depression Maternal Grandfather     Depression Paternal Grandmother     Osteoporosis Paternal Grandmother     Depression Paternal Grandfather     Cancer Paternal Grandfather         found late, mets, possible lung    Substance Abuse Paternal Grandfather     Depression Sister     Anesthesia Reaction No family hx of        Social History     Socioeconomic History    Marital status:      Spouse name: Not on file    Number of children: Not on file    Years of education: Not on file    Highest education level: Not on file   Occupational History    Not on file   Tobacco Use    Smoking status: Never     Passive exposure: Never    Smokeless tobacco: Never   Vaping Use    Vaping status: Never Used   Substance and Sexual Activity    Alcohol use: Yes     Alcohol/week: 2.5  standard drinks of alcohol     Types: 3 Standard drinks or equivalent per week    Drug use: No    Sexual activity: Yes     Partners: Male     Birth control/protection: I.U.D., Other   Other Topics Concern    Parent/sibling w/ CABG, MI or angioplasty before 65F 55M? No   Social History Narrative    Not on file     Social Drivers of Health     Financial Resource Strain: Low Risk  (1/29/2025)    Financial Resource Strain     Within the past 12 months, have you or your family members you live with been unable to get utilities (heat, electricity) when it was really needed?: No   Food Insecurity: Low Risk  (1/29/2025)    Food Insecurity     Within the past 12 months, did you worry that your food would run out before you got money to buy more?: No     Within the past 12 months, did the food you bought just not last and you didn t have money to get more?: No   Transportation Needs: Low Risk  (1/29/2025)    Transportation Needs     Within the past 12 months, has lack of transportation kept you from medical appointments, getting your medicines, non-medical meetings or appointments, work, or from getting things that you need?: No   Physical Activity: Insufficiently Active (1/29/2025)    Exercise Vital Sign     Days of Exercise per Week: 2 days     Minutes of Exercise per Session: 20 min   Stress: No Stress Concern Present (1/29/2025)    [employer] [place] [employer] - Occupational Stress Questionnaire     Feeling of Stress : Only a little   Social Connections: Unknown (1/29/2025)    Social Connection and Isolation Panel [NHANES]     Frequency of Communication with Friends and Family: Not on file     Frequency of Social Gatherings with Friends and Family: Once a week     Attends Voodoo Services: Not on file     Active Member of Clubs or Organizations: Not on file     Attends Club or Organization Meetings: Not on file     Marital Status: Not on file   Interpersonal Safety: Low Risk  (2/3/2025)    Interpersonal  Safety     Do you feel physically and emotionally safe where you currently live?: Yes     Within the past 12 months, have you been hit, slapped, kicked or otherwise physically hurt by someone?: No     Within the past 12 months, have you been humiliated or emotionally abused in other ways by your partner or ex-partner?: No   Housing Stability: Low Risk  (1/29/2025)    Housing Stability     Do you have housing? : Yes     Are you worried about losing your housing?: No       10 point Review of Systems is negative except for right foot pain which is noted in HPI.     Wt 108.9 kg (240 lb)   BMI 46.87 kg/m      BMI= Body mass index is 46.87 kg/m .    OBJECTIVE:  General appearance: Patient is alert and fully cooperative with history & exam.  No sign of distress is noted during the visit.    Vascular: Dorsalis pedis palpable right.  Dermatologic: Hyperkeratotic tissue x 1 with nucleus plantar fifth MPJ right foot.  Neurologic: All epicritic and proprioceptive sensations are grossly intact right.  Musculoskeletal: Pain to palpation fifth MPJ right foot.

## 2025-08-04 ENCOUNTER — TELEPHONE (OUTPATIENT)
Dept: SURGERY | Facility: CLINIC | Age: 45
End: 2025-08-04
Payer: COMMERCIAL

## 2025-08-13 ENCOUNTER — OFFICE VISIT (OUTPATIENT)
Dept: PODIATRY | Facility: CLINIC | Age: 45
End: 2025-08-13
Payer: COMMERCIAL

## 2025-08-13 DIAGNOSIS — M67.472 GANGLION CYST OF LEFT FOOT: Primary | ICD-10-CM

## 2025-08-13 DIAGNOSIS — M77.51 CAPSULITIS OF METATARSOPHALANGEAL (MTP) JOINT OF RIGHT FOOT: ICD-10-CM

## 2025-08-13 PROCEDURE — 99213 OFFICE O/P EST LOW 20 MIN: CPT | Performed by: PODIATRIST
